# Patient Record
Sex: FEMALE | Race: BLACK OR AFRICAN AMERICAN | Employment: FULL TIME | URBAN - METROPOLITAN AREA
[De-identification: names, ages, dates, MRNs, and addresses within clinical notes are randomized per-mention and may not be internally consistent; named-entity substitution may affect disease eponyms.]

---

## 2017-01-25 ENCOUNTER — OFFICE VISIT (OUTPATIENT)
Dept: INTERNAL MEDICINE CLINIC | Age: 62
End: 2017-01-25

## 2017-01-25 VITALS
SYSTOLIC BLOOD PRESSURE: 143 MMHG | HEART RATE: 98 BPM | WEIGHT: 207.6 LBS | BODY MASS INDEX: 33.37 KG/M2 | OXYGEN SATURATION: 99 % | RESPIRATION RATE: 20 BRPM | DIASTOLIC BLOOD PRESSURE: 80 MMHG | HEIGHT: 66 IN | TEMPERATURE: 98.1 F

## 2017-01-25 DIAGNOSIS — I10 ESSENTIAL HYPERTENSION: ICD-10-CM

## 2017-01-25 DIAGNOSIS — E66.9 OBESITY (BMI 30.0-34.9): ICD-10-CM

## 2017-01-25 DIAGNOSIS — E78.2 MIXED HYPERLIPIDEMIA: ICD-10-CM

## 2017-01-25 DIAGNOSIS — M54.50 ACUTE MIDLINE LOW BACK PAIN WITHOUT SCIATICA: Primary | ICD-10-CM

## 2017-01-25 DIAGNOSIS — F17.200 SMOKING: ICD-10-CM

## 2017-01-25 DIAGNOSIS — R73.9 ELEVATED BLOOD SUGAR: ICD-10-CM

## 2017-01-25 DIAGNOSIS — E55.9 VITAMIN D DEFICIENCY: ICD-10-CM

## 2017-01-25 RX ORDER — DICLOFENAC SODIUM 75 MG/1
75 TABLET, DELAYED RELEASE ORAL
Qty: 30 TAB | Refills: 0 | Status: SHIPPED | OUTPATIENT
Start: 2017-01-25 | End: 2018-04-17

## 2017-01-25 RX ORDER — BISOPROLOL FUMARATE AND HYDROCHLOROTHIAZIDE 2.5; 6.25 MG/1; MG/1
1 TABLET ORAL DAILY
Qty: 90 TAB | Refills: 1 | Status: SHIPPED | OUTPATIENT
Start: 2017-01-25 | End: 2017-07-25 | Stop reason: SDUPTHER

## 2017-01-25 RX ORDER — SIMVASTATIN 20 MG/1
20 TABLET, FILM COATED ORAL
Qty: 90 TAB | Refills: 1 | Status: SHIPPED | OUTPATIENT
Start: 2017-01-25 | End: 2017-07-25 | Stop reason: SDUPTHER

## 2017-01-25 NOTE — PATIENT INSTRUCTIONS

## 2017-01-25 NOTE — PROGRESS NOTES
Health Maintenance Due   Topic Date Due    Hepatitis C Screening  1955    Pneumococcal 19-64 Medium Risk (1 of 1 - PPSV23) 11/18/1974    FOBT Q 1 YEAR AGE 50-75  11/18/2005    ZOSTER VACCINE AGE 60>  11/18/2015    INFLUENZA AGE 9 TO ADULT  08/01/2016       Chief Complaint   Patient presents with    Follow-up    Hypertension    Cholesterol Problem    Obesity    Hip Pain     left hip pain radiating down left leg       1. Have you been to the ER, urgent care clinic since your last visit? Hospitalized since your last visit? No    2. Have you seen or consulted any other health care providers outside of the [address] [place] [address] since your last visit? Include any pap smears or colon screening. No    3) Do you have an Advance Directive on file? no    4) Are you interested in receiving information on Advance Directives? NO      Patient is accompanied by self I have received verbal consent from Rudy Bolivar to discuss any/all medical information while they are present in the room.

## 2017-01-25 NOTE — PROGRESS NOTES
HISTORY OF PRESENT ILLNESS  Jessica Mejia is a 64 y.o. female here for follow up. reports lower back pain radiates to left hip and leg sometimes. no weakness in legs. no bowel bladder problem. Gained 9 pound weight. not watching diet. Has HTN,on med. need refill. Has elevated lipid. on statin. no myalgia. mammogram was abnormal.need F/U.  colonoscopy was normal and up to date. HPI    Review of Systems   Constitutional: Negative. HENT: Negative. Eyes: Negative. Respiratory: Negative. Cardiovascular: Negative. Gastrointestinal: Negative. Genitourinary: Negative. Musculoskeletal: Positive for back pain. Negative for falls. Skin: Negative. Neurological: Negative. Psychiatric/Behavioral: Negative. Negative for depression and suicidal ideas. Physical Exam   Constitutional: She appears well-developed and well-nourished. No distress. Neck: Normal range of motion. Neck supple. No JVD present. No thyromegaly present. Cardiovascular: Normal rate, regular rhythm, normal heart sounds and intact distal pulses. Pulmonary/Chest: Effort normal and breath sounds normal. No respiratory distress. She has no wheezes. She has no rales. Abdominal: Soft. Bowel sounds are normal. She exhibits no distension. There is no tenderness. Musculoskeletal: She exhibits tenderness. Lower back:spine tender. paravertebral facet tenderness. ORm restricted. Straight leg rising test negative. ASSESSMENT and PLAN  Silva Moreno was seen today for follow-up, hypertension, cholesterol problem, obesity and hip pain. Diagnoses and all orders for this visit:    Acute midline low back pain without sciatica    Rest and heating pad. Will give,  -     CBC WITH AUTOMATED DIFF  -     XR SPINE LUMB MIN 4 V; Future if not better. -     diclofenac EC (VOLTAREN) 75 mg EC tablet; Take 1 Tab by mouth two (2) times daily as needed. Essential hypertension    Stable.   -     METABOLIC PANEL, COMPREHENSIVE  -     CBC WITH AUTOMATED DIFF  -     bisoprolol-hydroCHLOROthiazide (ZIAC) 2.5-6.25 mg per tablet; Take 1 Tab by mouth daily. Mixed hyperlipidemia    Lipid is stable. will do,  -     METABOLIC PANEL, COMPREHENSIVE  -     simvastatin (ZOCOR) 20 mg tablet; Take 1 Tab by mouth nightly. Obesity (BMI 30.0-34. 9)  Gained 9 pound weight. Addressed weight, diet and exercise with patient. Decrease carbohydrates (white foods, sweet foods, sweet drinks and alcohol), increase green leafy vegetables and protein (lean meats and beans) with each meal. Avoid fried foods. Eat 3-5 small meals daily. Do not skip meals. Increase water intake. Increase physical activity to 30 minutes daily for health benefit or 60 minutes daily to prevent weight regain, as tolerated. Get 7-8 hours uninterrupted sleep nightly. Elevated blood sugar  -     HEMOGLOBIN A1C WITH EAG    Vitamin D deficiency  -     VITAMIN D, 25 HYDROXY    Smoking    Smokes 2/3 ciggrates a day. Discussed expected course/resolution/complications of diagnosis in detail with patient. Medication risks/benefits/costs/interactions/alternatives discussed with patient. Pt was given an after visit summary which includes diagnoses, current medications & vitals. Pt expressed understanding with the diagnosis and plan.

## 2017-01-25 NOTE — MR AVS SNAPSHOT
Visit Information Date & Time Provider Department Dept. Phone Encounter #  
 1/25/2017  1:45 PM Muriel Ellis, [facility] [id number] Upcoming Health Maintenance Date Due Hepatitis C Screening 1955 Pneumococcal 19-64 Medium Risk (1 of 1 - PPSV23) 11/18/1974 FOBT Q 1 YEAR AGE 50-75 11/18/2005 ZOSTER VACCINE AGE 60> 11/18/2015 INFLUENZA AGE 9 TO ADULT 8/1/2016 PAP AKA CERVICAL CYTOLOGY 7/2/2017 BREAST CANCER SCRN MAMMOGRAM 11/16/2018 DTaP/Tdap/Td series (2 - Td) 8/10/2026 Allergies as of 1/25/2017  Review Complete On: 1/25/2017 By: Muriel Ellis MD  
 No Known Allergies Current Immunizations  Reviewed on 8/10/2016 Name Date Tdap 8/10/2016 Not reviewed this visit You Were Diagnosed With   
  
 Codes Comments Acute midline low back pain without sciatica    -  Primary ICD-10-CM: M54.5 ICD-9-CM: 724.2 Essential hypertension     ICD-10-CM: I10 
ICD-9-CM: 401.9 Mixed hyperlipidemia     ICD-10-CM: E78.2 ICD-9-CM: 272.2 Obesity (BMI 30.0-34.9)     ICD-10-CM: U59.7 ICD-9-CM: 278.00 Elevated blood sugar     ICD-10-CM: R73.9 ICD-9-CM: 790.29 Vitamin D deficiency     ICD-10-CM: E55.9 ICD-9-CM: 268.9 Smoking     ICD-10-CM: F17.200 ICD-9-CM: 305.1 Vitals BP Pulse Temp Resp Height(growth percentile) Weight(growth percentile) 143/80 (BP 1 Location: Left arm, BP Patient Position: Sitting) 98 98.1 °F (36.7 °C) (Oral) 20 5' 6\" (1.676 m) 207 lb 9.6 oz (94.2 kg) SpO2 BMI OB Status Smoking Status 99% 33.51 kg/m2 Postmenopausal Current Every Day Smoker Vitals History BMI and BSA Data Body Mass Index Body Surface Area  
 33.51 kg/m 2 2.09 m 2 Preferred Pharmacy Pharmacy Name Phone [facility] [address] [place] [address] [phone] Your Updated Medication List  
  
   
 This list is accurate as of: 1/25/17  2:17 PM.  Always use your most recent med list.  
  
  
  
  
 bisoprolol-hydroCHLOROthiazide 2.5-6.25 mg per tablet Commonly known as:  [facility] TAKE ONE TABLET BY MOUTH ONCE DAILY  
  
 calcium carbonate 200 mg calcium (500 mg) Chew Commonly known as:  TUMS Take 1 Tab by mouth daily. diclofenac EC 75 mg EC tablet Commonly known as:  VOLTAREN Take 1 Tab by mouth two (2) times daily as needed. ergocalciferol 50,000 unit capsule Commonly known as:  ERGOCALCIFEROL Take 1 Cap by mouth every seven (7) days. simvastatin 20 mg tablet Commonly known as:  ZOCOR  
TAKE ONE TABLET BY MOUTH NIGHTLY  
  
 VALTREX 500 mg tablet Generic drug:  valACYclovir Take  by mouth two (2) times a day. varenicline 0.5 mg (11)- 1 mg (42) Dspk Commonly known as:  CHANTIX STARTER INÉS As directed Prescriptions Sent to Pharmacy Refills  
 diclofenac EC (VOLTAREN) 75 mg EC tablet 0 Sig: Take 1 Tab by mouth two (2) times daily as needed. Class: Normal  
 Pharmacy: [address] #: [phone] Route: Oral  
  
We Performed the Following CBC WITH AUTOMATED DIFF [33723 CPT(R)] HEMOGLOBIN A1C WITH EAG [16783 CPT(R)] METABOLIC PANEL, COMPREHENSIVE [67341 CPT(R)] VITAMIN D, 25 HYDROXY [id number] CPT(R)] To-Do List   
 02/25/2017 Imaging:  XR SPINE LUMB MIN 4 V Patient Instructions Back Stretches: Exercises Your Care Instructions Here are some examples of exercises for stretching your back. Start each exercise slowly. Ease off the exercise if you start to have pain. Your doctor or physical therapist will tell you when you can start these exercises and which ones will work best for you. How to do the exercises Overhead stretch 1. Stand comfortably with your feet shoulder-width apart. 2. Looking straight ahead, raise both arms over your head and reach toward the ceiling. Do not allow your head to tilt back. 3. Hold for 15 to 30 seconds, then lower your arms to your sides. 4. Repeat 2 to 4 times. Side stretch 1. Stand comfortably with your feet shoulder-width apart. 2. Raise one arm over your head, and then lean to the other side. 3. Slide your hand down your leg as you let the weight of your arm gently stretch your side muscles. Hold for 15 to 30 seconds. 4. Repeat 2 to 4 times on each side. Press-up 1. Lie on your stomach, supporting your body with your forearms. 2. Press your elbows down into the floor to raise your upper back. As you do this, relax your stomach muscles and allow your back to arch without using your back muscles. As your press up, do not let your hips or pelvis come off the floor. 3. Hold for 15 to 30 seconds, then relax. 4. Repeat 2 to 4 times. Relax and rest 
 
1. Lie on your back with a rolled towel under your neck and a pillow under your knees. Extend your arms comfortably to your sides. 2. Relax and breathe normally. 3. Remain in this position for about 10 minutes. 4. If you can, do this 2 or 3 times each day. Follow-up care is a key part of your treatment and safety. Be sure to make and go to all appointments, and call your doctor if you are having problems. It's also a good idea to know your test results and keep a list of the medicines you take. Where can you learn more? Go to http://chip-chetan.info/. Enter T088 in the search box to learn more about \"Back Stretches: Exercises. \" Current as of: May 23, 2016 Content Version: 11.1 © 3453-5289 [employer]. Care instructions adapted under license by [employer] (which disclaims liability or warranty for this information).  If you have questions about a medical condition or this instruction, always ask your healthcare professional. Cinthia Hansen[employer] disclaims any warranty or liability for your use of this information. Introducing [facility]! Romayne Duster introduces [employer]ix patient portal. Now you can access parts of your medical record, email your doctor's office, and request medication refills online. 1. In your internet browser, go to [email or web address] 2. Click on the First Time User? Click Here link in the Sign In box. You will see the New Member Sign Up page. 3. Enter your Quartix Access Code exactly as it appears below. You will not need to use this code after youve completed the sign-up process. If you do not sign up before the expiration date, you must request a new code. · Quartix Access Code: [email or web address] Expires: 4/25/2017  2:17 PM 
 
4. Enter the last four digits of your Social Security Number (xxxx) and Date of Birth (mm/dd/yyyy) as indicated and click Submit. You will be taken to the next sign-up page. 5. Create a Quartix ID. This will be your Quartix login ID and cannot be changed, so think of one that is secure and easy to remember. 6. Create a Quartix password. You can change your password at any time. 7. Enter your Password Reset Question and Answer. This can be used at a later time if you forget your password. 8. Enter your e-mail address. You will receive e-mail notification when new information is available in [address] 9. Click Sign Up. You can now view and download portions of your medical record. 10. Click the Download Summary menu link to download a portable copy of your medical information. If you have questions, please visit the Frequently Asked Questions section of the [employer] website. Remember, [employer]ix is NOT to be used for urgent needs. For medical emergencies, dial 911. Now available from your iPhone and Android! Please provide this summary of care documentation to your next provider. Your primary care clinician is listed as Ji Patricio. If you have any questions after today's visit, please call [phone].

## 2017-01-25 NOTE — LETTER
2/3/2017 11:30 AM 
 
Ms. Bhavya Alvarado [address] [employer] [id number] Dear Bhavya Alvarado: 
 
Please find your most recent results below. Resulted Orders METABOLIC PANEL, COMPREHENSIVE Result Value Ref Range Glucose 95 65 - 99 mg/dL BUN 15 8 - 27 mg/dL Creatinine 0.81 0.57 - 1.00 mg/dL GFR est non-AA 79 >59 mL/min/1.73 GFR est AA 91 >59 mL/min/1.73  
 BUN/Creatinine ratio 19 11 - 26 Sodium 144 134 - 144 mmol/L Potassium 4.5 3.5 - 5.2 mmol/L Chloride 102 96 - 106 mmol/L  
 CO2 26 18 - 29 mmol/L Calcium 9.6 8.7 - 10.3 mg/dL Protein, total 6.9 6.0 - 8.5 g/dL Albumin 4.2 3.6 - 4.8 g/dL GLOBULIN, TOTAL 2.7 1.5 - 4.5 g/dL A-G Ratio 1.6 1.1 - 2.5 Bilirubin, total 0.2 0.0 - 1.2 mg/dL Alk. phosphatase 98 39 - 117 IU/L  
 AST (SGOT) 19 0 - 40 IU/L  
 ALT (SGPT) 16 0 - 32 IU/L Narrative Performed at:  [address]  [id number] : Thao Romo MD, Phone:  [phone] CBC WITH AUTOMATED DIFF Result Value Ref Range WBC 4.1 3.4 - 10.8 x10E3/uL  
 RBC 3.76 (L) 3.77 - 5.28 x10E6/uL HGB 11.7 11.1 - 15.9 g/dL HCT 35.8 34.0 - 46.6 % MCV 95 79 - 97 fL  
 MCH 31.1 26.6 - 33.0 pg  
 MCHC 32.7 31.5 - 35.7 g/dL  
 RDW 13.1 12.3 - 15.4 % PLATELET 402 045 - 607 x10E3/uL NEUTROPHILS 50 % Lymphocytes 38 % MONOCYTES 7 % EOSINOPHILS 4 % BASOPHILS 1 %  
 ABS. NEUTROPHILS 2.1 1.4 - 7.0 x10E3/uL Abs Lymphocytes 1.6 0.7 - 3.1 x10E3/uL  
 ABS. MONOCYTES 0.3 0.1 - 0.9 x10E3/uL  
 ABS. EOSINOPHILS 0.2 0.0 - 0.4 x10E3/uL  
 ABS. BASOPHILS 0.0 0.0 - 0.2 x10E3/uL IMMATURE GRANULOCYTES 0 %  
 ABS. IMM. GRANS. 0.0 0.0 - 0.1 x10E3/uL Narrative Performed at:  [address]  [id number] : Thao Romo MD, Phone:  [phone] HEMOGLOBIN A1C WITH EAG Result Value Ref Range Hemoglobin A1c 5.9 (H) 4.8 - 5.6 % Comment: Pre-diabetes: 5.7 - 6.4 Diabetes: >6.4 Glycemic control for adults with diabetes: <7.0 Estimated average glucose 123 mg/dL Narrative Performed at:  [address]  [id number] : Darletta Dancer MD, Phone:  [phone] VITAMIN D, 25 HYDROXY Result Value Ref Range VITAMIN D, 25-HYDROXY 18.3 (L) 30.0 - 100.0 ng/mL Comment:  
   Vitamin D deficiency has been defined by the [address] [place] [address] practice guideline as a 
level of serum 25-OH vitamin D less than 20 ng/mL (1,2). The Endocrine Society went on to further define vitamin D 
insufficiency as a level between 21 and 29 ng/mL (2). 1. IOM ([place] of Medicine). 2010. Dietary reference 
   intakes for calcium and D. [address] [place] [address]: The 
   [place]. 2. Katie MF, Araceli NC, Kerline ARNOLD, et al. 
   Evaluation, treatment, and prevention of vitamin D 
   deficiency: an Endocrine Society clinical practice 
   guideline. JCEM. 2011 Jul; 96(7):1911-30. Narrative Performed at:  [address]  [id number] : Darletta Dancer MD, Phone:  [phone] RECOMMENDATIONS: 
Quin Nap your labs indicate that  your vitamin  D level very low, will start on vit D 50,000 unit 1 cap weekly for 4 months. will repeat level in 4 months and increase your consumption of  milk product and expose to sun for 20 min a day. All other labs are stable Please call me if you have any questions: [phone] Sincerely, Saumya Shane MD

## 2017-01-26 LAB
25(OH)D3+25(OH)D2 SERPL-MCNC: 18.3 NG/ML (ref 30–100)
ALBUMIN SERPL-MCNC: 4.2 G/DL (ref 3.6–4.8)
ALBUMIN/GLOB SERPL: 1.6 {RATIO} (ref 1.1–2.5)
ALP SERPL-CCNC: 98 IU/L (ref 39–117)
ALT SERPL-CCNC: 16 IU/L (ref 0–32)
AST SERPL-CCNC: 19 IU/L (ref 0–40)
BASOPHILS # BLD AUTO: 0 X10E3/UL (ref 0–0.2)
BASOPHILS NFR BLD AUTO: 1 %
BILIRUB SERPL-MCNC: 0.2 MG/DL (ref 0–1.2)
BUN SERPL-MCNC: 15 MG/DL (ref 8–27)
BUN/CREAT SERPL: 19 (ref 11–26)
CALCIUM SERPL-MCNC: 9.6 MG/DL (ref 8.7–10.3)
CHLORIDE SERPL-SCNC: 102 MMOL/L (ref 96–106)
CO2 SERPL-SCNC: 26 MMOL/L (ref 18–29)
CREAT SERPL-MCNC: 0.81 MG/DL (ref 0.57–1)
EOSINOPHIL # BLD AUTO: 0.2 X10E3/UL (ref 0–0.4)
EOSINOPHIL NFR BLD AUTO: 4 %
ERYTHROCYTE [DISTWIDTH] IN BLOOD BY AUTOMATED COUNT: 13.1 % (ref 12.3–15.4)
EST. AVERAGE GLUCOSE BLD GHB EST-MCNC: 123 MG/DL
GLOBULIN SER CALC-MCNC: 2.7 G/DL (ref 1.5–4.5)
GLUCOSE SERPL-MCNC: 95 MG/DL (ref 65–99)
HBA1C MFR BLD: 5.9 % (ref 4.8–5.6)
HCT VFR BLD AUTO: 35.8 % (ref 34–46.6)
HGB BLD-MCNC: 11.7 G/DL (ref 11.1–15.9)
IMM GRANULOCYTES # BLD: 0 X10E3/UL (ref 0–0.1)
IMM GRANULOCYTES NFR BLD: 0 %
LYMPHOCYTES # BLD AUTO: 1.6 X10E3/UL (ref 0.7–3.1)
LYMPHOCYTES NFR BLD AUTO: 38 %
MCH RBC QN AUTO: 31.1 PG (ref 26.6–33)
MCHC RBC AUTO-ENTMCNC: 32.7 G/DL (ref 31.5–35.7)
MCV RBC AUTO: 95 FL (ref 79–97)
MONOCYTES # BLD AUTO: 0.3 X10E3/UL (ref 0.1–0.9)
MONOCYTES NFR BLD AUTO: 7 %
NEUTROPHILS # BLD AUTO: 2.1 X10E3/UL (ref 1.4–7)
NEUTROPHILS NFR BLD AUTO: 50 %
PLATELET # BLD AUTO: 293 X10E3/UL (ref 150–379)
POTASSIUM SERPL-SCNC: 4.5 MMOL/L (ref 3.5–5.2)
PROT SERPL-MCNC: 6.9 G/DL (ref 6–8.5)
RBC # BLD AUTO: 3.76 X10E6/UL (ref 3.77–5.28)
SODIUM SERPL-SCNC: 144 MMOL/L (ref 134–144)
WBC # BLD AUTO: 4.1 X10E3/UL (ref 3.4–10.8)

## 2017-02-01 DIAGNOSIS — E55.9 VITAMIN D DEFICIENCY: ICD-10-CM

## 2017-02-01 RX ORDER — ERGOCALCIFEROL 1.25 MG/1
50000 CAPSULE ORAL
Qty: 4 CAP | Refills: 2 | Status: SHIPPED | OUTPATIENT
Start: 2017-02-01 | End: 2017-10-26 | Stop reason: ALTCHOICE

## 2017-02-01 NOTE — PROGRESS NOTES
vit D level very low.will start on vit D 50,000 unit 1 cap weekly for 4 months. will repeat level in 4 months. adv to be on milk product and expose to sun for 20 min a day. all other labs are stable.

## 2017-02-03 NOTE — PROGRESS NOTES
Spoke with patient after verifying name and . Notified patient of lab results and recommendation from provider. Patient verbalized understanding and given a chance to ask questions.   Letter mailed to patient with results and recommendations from provider

## 2017-06-23 ENCOUNTER — HOSPITAL ENCOUNTER (OUTPATIENT)
Dept: MAMMOGRAPHY | Age: 62
Discharge: HOME | End: 2017-06-23
Attending: INTERNAL MEDICINE
Payer: SELF-PAY

## 2017-06-23 DIAGNOSIS — R92.8 FOLLOW-UP EXAMINATION OF ABNORMAL MAMMOGRAM: ICD-10-CM

## 2017-06-23 PROCEDURE — 77066 DX MAMMO INCL CAD BI: CPT

## 2017-06-23 NOTE — LETTER
6/28/2017 9:41 AM 
 
Ms. Yeison Arnold [address] [place] [id number] Dear Yeison Arnold: 
 
Please find your most recent results below. Resulted Orders [facility] MAMMO BI DX INCL CAD Narrative EXAM:  [facility] MAMMO BI DX INCL CAD INDICATION: The patient presents for six-month follow-up of bilateral breast 
densities. No current breast complaint. No family history. COMPARISON: 11/2016, 8/2015, and dating back to 3/2003 TECHNIQUE: Diagnostic bilateral digital MLO, CC, and spot compression views. Computer aided detection (CAD) was utilized. BREAST COMPOSITION: There are scattered fibroglandular densities (approximately 25-50% glandular). FINDINGS:  
There is a stable bilateral breast parenchymal pattern. No suspicious mass, 
cluster of pleomorphic calcifications, or architectural distortion to suggest 
malignancy. No skin thickening or nipple retraction. Impression IMPRESSION: No evidence for malignancy in the right or left breast. 
 
Recommendation: 
Bilateral screening mammography in one year. BI-RADS Assessment Category 2: Benign finding. The findings and recommendations were discussed with the patient at the time of 
the exam. 
  
 
 
RECOMMENDATIONS: 
None. Keep up the good work! Please call me if you have any questions: [phone] Sincerely, [facility] 6

## 2017-07-25 DIAGNOSIS — E78.2 MIXED HYPERLIPIDEMIA: ICD-10-CM

## 2017-07-25 DIAGNOSIS — I10 ESSENTIAL HYPERTENSION: ICD-10-CM

## 2017-07-25 RX ORDER — SIMVASTATIN 20 MG/1
TABLET, FILM COATED ORAL
Qty: 90 TAB | Refills: 1 | Status: SHIPPED | OUTPATIENT
Start: 2017-07-25 | End: 2018-01-22 | Stop reason: SDUPTHER

## 2017-07-25 RX ORDER — BISOPROLOL FUMARATE AND HYDROCHLOROTHIAZIDE 2.5; 6.25 MG/1; MG/1
TABLET ORAL
Qty: 90 TAB | Refills: 1 | Status: SHIPPED | OUTPATIENT
Start: 2017-07-25 | End: 2018-01-22 | Stop reason: SDUPTHER

## 2017-10-26 ENCOUNTER — OFFICE VISIT (OUTPATIENT)
Dept: INTERNAL MEDICINE CLINIC | Age: 62
End: 2017-10-26

## 2017-10-26 VITALS
HEIGHT: 66 IN | OXYGEN SATURATION: 97 % | RESPIRATION RATE: 20 BRPM | WEIGHT: 197 LBS | TEMPERATURE: 98.5 F | SYSTOLIC BLOOD PRESSURE: 146 MMHG | BODY MASS INDEX: 31.66 KG/M2 | HEART RATE: 62 BPM | DIASTOLIC BLOOD PRESSURE: 87 MMHG

## 2017-10-26 DIAGNOSIS — E66.9 OBESITY (BMI 30.0-34.9): ICD-10-CM

## 2017-10-26 DIAGNOSIS — R73.03 PRE-DIABETES: ICD-10-CM

## 2017-10-26 DIAGNOSIS — K59.00 CONSTIPATION, UNSPECIFIED CONSTIPATION TYPE: ICD-10-CM

## 2017-10-26 DIAGNOSIS — F17.200 SMOKING: ICD-10-CM

## 2017-10-26 DIAGNOSIS — I10 ESSENTIAL HYPERTENSION: Primary | ICD-10-CM

## 2017-10-26 DIAGNOSIS — E55.9 VITAMIN D DEFICIENCY: ICD-10-CM

## 2017-10-26 DIAGNOSIS — E78.2 MIXED HYPERLIPIDEMIA: ICD-10-CM

## 2017-10-26 RX ORDER — ERGOCALCIFEROL 1.25 MG/1
50000 CAPSULE ORAL
Qty: 4 CAP | Refills: 2 | Status: SHIPPED | OUTPATIENT
Start: 2017-10-26 | End: 2019-05-29 | Stop reason: ALTCHOICE

## 2017-10-26 NOTE — LETTER
11/7/2017 8:07 AM 
 
Ms. Chente Ely [address] [place] [id number] Dear Chente Ely: 
 
Please find your most recent results below. Resulted Orders METABOLIC PANEL, COMPREHENSIVE Result Value Ref Range Glucose 115 (H) 65 - 99 mg/dL BUN 12 8 - 27 mg/dL Creatinine 0.71 0.57 - 1.00 mg/dL GFR est non-AA 92 >59 mL/min/1.73 GFR est  >59 mL/min/1.73  
 BUN/Creatinine ratio 17 12 - 28 Sodium 143 134 - 144 mmol/L Potassium 4.5 3.5 - 5.2 mmol/L Chloride 103 96 - 106 mmol/L  
 CO2 27 18 - 29 mmol/L Calcium 9.6 8.7 - 10.3 mg/dL Protein, total 7.2 6.0 - 8.5 g/dL Albumin 4.4 3.6 - 4.8 g/dL GLOBULIN, TOTAL 2.8 1.5 - 4.5 g/dL A-G Ratio 1.6 1.2 - 2.2 Bilirubin, total 0.4 0.0 - 1.2 mg/dL Alk. phosphatase 113 39 - 117 IU/L  
 AST (SGOT) 17 0 - 40 IU/L  
 ALT (SGPT) 14 0 - 32 IU/L Narrative Performed at:  [address]  [id number] : Marla Leone MD, Phone:  [phone] HEMOGLOBIN A1C WITH EAG Result Value Ref Range Hemoglobin A1c 5.5 4.8 - 5.6 % Comment:  
            Pre-diabetes: 5.7 - 6.4 Diabetes: >6.4 Glycemic control for adults with diabetes: <7.0 Estimated average glucose 111 mg/dL Narrative Performed at:  [address]  [id number] : Marla Leone MD, Phone:  [phone] LIPID PANEL Result Value Ref Range Cholesterol, total 192 100 - 199 mg/dL Triglyceride 69 0 - 149 mg/dL HDL Cholesterol 60 >39 mg/dL VLDL, calculated 14 5 - 40 mg/dL LDL, calculated 118 (H) 0 - 99 mg/dL Narrative Performed at:  [address]  [id number] : Marla Leone MD, Phone:  [phone] VITAMIN D, 25 HYDROXY Result Value Ref Range VITAMIN D, 25-HYDROXY CANCELED ng/mL    Comment:  
   Test cancelled at client's request. 
 Vitamin D deficiency has been defined by the [facility] practice guideline as a 
level of serum 25-OH vitamin D less than 20 ng/mL (1,2). The Endocrine Society went on to further define vitamin D 
insufficiency as a level between 21 and 29 ng/mL (2). 1. IOM ([place] of Medicine). 2010. Dietary reference 
   intakes for calcium and D. [facility]: The 
   [place]. 2. Katie MF, Araceli NC, Kerline ARNOLD, et al. 
   Evaluation, treatment, and prevention of vitamin D 
   deficiency: an Endocrine Society clinical practice 
   guideline. JCEM. 2011 Jul; 96(7):1911-30. Result canceled by the ancillary Narrative Performed at:  [address]  [id number] : Isiah Sands MD, Phone:  [phone] CVD REPORT Result Value Ref Range INTERPRETATION Note Comment:  
   Supplemental report is available. Narrative Performed at:  [address] [place] [address]  [id number] : Vicky Gillespie PhD, Phone:  [phone] RECOMMENDATIONS: 
Karmen Garsia your labs indicate that Your Vitamin D level is low, start taking OTC Vitamin D 1000 units 1 x a day for 4 months. Also increase your consumption of milk and exposure to the sun for 20 minutes a day. We will recheck your Vitamin D level in 4 months Also,  your total cholesterol and LDL are elevated, please be on a low fat/cholesterol diet and exercise as tolerated. All other labs are stable Please call me if you have any questions: [phone] Sincerely, Lboo Yan MD

## 2017-10-26 NOTE — PROGRESS NOTES
Health Maintenance Due   Topic Date Due    Hepatitis C Screening  1955    Pneumococcal 19-64 Medium Risk (1 of 1 - PPSV23) 11/18/1974    FOBT Q 1 YEAR AGE 50-75  11/18/2005    ZOSTER VACCINE AGE 60>  09/18/2015    PAP AKA CERVICAL CYTOLOGY  07/02/2017    INFLUENZA AGE 9 TO ADULT  08/01/2017       Chief Complaint   Patient presents with    Hypertension    Cholesterol Problem    Obesity       1. Have you been to the ER, urgent care clinic since your last visit? Hospitalized since your last visit? No    2. Have you seen or consulted any other health care providers outside of the [address] [place] [address] since your last visit? Include any pap smears or colon screening. No    3) Do you have an Advance Directive on file? no    4) Are you interested in receiving information on Advance Directives? NO      Patient is accompanied by self I have received verbal consent from Rudy Bolivar to discuss any/all medical information while they are present in the room.

## 2017-10-26 NOTE — MR AVS SNAPSHOT
Visit Information Date & Time Provider Department Dept. Phone Encounter #  
 10/26/2017  9:30 AM Saumya Shane MD [facility] Internal Medicine [phone] [id number] Upcoming Health Maintenance Date Due Hepatitis C Screening 1955 Pneumococcal 19-64 Medium Risk (1 of 1 - PPSV23) 11/18/1974 FOBT Q 1 YEAR AGE 50-75 11/18/2005 ZOSTER VACCINE AGE 60> 9/18/2015 PAP AKA CERVICAL CYTOLOGY 7/2/2017 BREAST CANCER SCRN MAMMOGRAM 6/23/2019 DTaP/Tdap/Td series (2 - Td) 8/10/2026 Allergies as of 10/26/2017  Review Complete On: 10/26/2017 By: Saumya Shane MD  
 No Known Allergies Current Immunizations  Reviewed on 10/26/2017 Name Date Tdap 8/10/2016 Reviewed by Saumya Shane MD on 10/26/2017 at 10:01 AM  
You Were Diagnosed With   
  
 Codes Comments Essential hypertension    -  Primary ICD-10-CM: I10 
ICD-9-CM: 401.9 Mixed hyperlipidemia     ICD-10-CM: E78.2 ICD-9-CM: 272.2 Obesity (BMI 30.0-34.9)     ICD-10-CM: M11.9 ICD-9-CM: 278.00 Pre-diabetes     ICD-10-CM: R73.03 
ICD-9-CM: 790.29 Smoking     ICD-10-CM: F17.200 ICD-9-CM: 305.1 Vitamin D deficiency     ICD-10-CM: E55.9 ICD-9-CM: 268.9 Constipation, unspecified constipation type     ICD-10-CM: K59.00 ICD-9-CM: 564.00 Vitals BP Pulse Temp Resp Height(growth percentile) Weight(growth percentile) 146/87 (BP 1 Location: Left arm, BP Patient Position: Sitting) 62 98.5 °F (36.9 °C) (Oral) 20 5' 6\" (1.676 m) 197 lb (89.4 kg) SpO2 BMI OB Status Smoking Status 97% 31.8 kg/m2 Postmenopausal Current Every Day Smoker Vitals History BMI and BSA Data Body Mass Index Body Surface Area  
 31.8 kg/m 2 2.04 m 2 Preferred Pharmacy Pharmacy Name Phone [facility] [address] [place] [address] [phone] Your Updated Medication List  
  
   
This list is accurate as of: 10/26/17 10:01 AM.  Always use your most recent med list.  
  
  
  
  
 bisoprolol-hydroCHLOROthiazide 2.5-6.25 mg per tablet Commonly known as:  Formerly [facility] TAKE ONE TABLET BY MOUTH ONCE DAILY  
  
 calcium carbonate 200 mg calcium (500 mg) Chew Commonly known as:  TUMS Take 1 Tab by mouth daily. diclofenac EC 75 mg EC tablet Commonly known as:  VOLTAREN Take 1 Tab by mouth two (2) times daily as needed. simvastatin 20 mg tablet Commonly known as:  ZOCOR  
TAKE ONE TABLET BY MOUTH NIGHTLY  
  
 VALTREX 500 mg tablet Generic drug:  valACYclovir Take  by mouth two (2) times a day. varenicline 0.5 mg (11)- 1 mg (42) Dspk Commonly known as:  CHANTIX STARTER INÉS As directed We Performed the Following HEMOGLOBIN A1C WITH EAG [95855 CPT(R)] LIPID PANEL [40033 CPT(R)] METABOLIC PANEL, COMPREHENSIVE [50255 CPT(R)] VITAMIN D, 25 HYDROXY [id number] CPT(R)] Patient Instructions High-Fiber Diet: Care Instructions Your Care Instructions A high-fiber diet may help you relieve constipation and feel less bloated. Your doctor and dietitian will help you make a high-fiber eating plan based on your personal needs. The plan will include the things you like to eat. It will also make sure that you get 30 grams of fiber a day. Before you make changes to the way you eat, be sure to talk with your doctor or dietitian. Follow-up care is a key part of your treatment and safety. Be sure to make and go to all appointments, and call your doctor if you are having problems. It's also a good idea to know your test results and keep a list of the medicines you take. How can you care for yourself at home? · You can increase how much fiber you get if you eat more of certain foods. These foods include: ¨ Whole-grain breads and cereals. ¨ Fruits, such as pears, apples, and peaches. Eat the skins, peels, and seeds, if you can. ¨ Vegetables, such as broccoli, cabbage, spinach, carrots, asparagus, and squash. ¨ Starchy vegetables. These include potatoes with skins, kidney beans, and lima beans. · Take a fiber supplement every day if your doctor recommends it. Examples are Benefiber, Citrucel, FiberCon, and Metamucil. Ask your doctor how much to take. · Drink plenty of fluids, enough so that your urine is light yellow or clear like water. If you have kidney, heart, or liver disease and have to limit fluids, talk with your doctor before you increase the amount of fluids you drink. · Get some exercise every day. Exercise helps stool move through the colon. It also helps prevent constipation. · Keep a food diary. Try to notice and write down what foods cause gas, pain, or other symptoms. Then you can avoid these foods. Where can you learn more? Go to http://chip-chetan.info/. Enter G383 in the search box to learn more about \"High-Fiber Diet: Care Instructions. \" Current as of: May 12, 2017 Content Version: 11.4 © 9113-1057 [employer]. Care instructions adapted under license by [employer] (which disclaims liability or warranty for this information). If you have questions about a medical condition or this instruction, always ask your healthcare professional. [place] [address] any warranty or liability for your use of this information. Introducing \[facility]\"" & [facility] SERVICES! Tomás Cha introduces [employer] patient portal. Now you can access parts of your medical record, email your doctor's office, and request medication refills online. 1. In your internet browser, go to [email or web address] 2. Click on the First Time User? Click Here link in the Sign In box. You will see the New Member Sign Up page. 3. Enter your [employer] Access Code exactly as it appears below. You will not need to use this code after youve completed the sign-up process. If you do not sign up before the expiration date, you must request a new code. · [employer] Access Code: [email or web address] Expires: 1/13/2018  2:09 PM 
 
4. Enter the last four digits of your Social Security Number (xxxx) and Date of Birth (mm/dd/yyyy) as indicated and click Submit. You will be taken to the next sign-up page. 5. Create a [employer] ID. This will be your [employer] login ID and cannot be changed, so think of one that is secure and easy to remember. 6. Create a [employer] password. You can change your password at any time. 7. Enter your Password Reset Question and Answer. This can be used at a later time if you forget your password. 8. Enter your e-mail address. You will receive e-mail notification when new information is available in [address] 9. Click Sign Up. You can now view and download portions of your medical record. 10. Click the Download Summary menu link to download a portable copy of your medical information. If you have questions, please visit the Frequently Asked Questions section of the [employer] website. Remember, [employer] is NOT to be used for urgent needs. For medical emergencies, dial 911. Now available from your iPhone and Android! Please provide this summary of care documentation to your next provider. Your primary care clinician is listed as Janel Salazar. If you have any questions after today's visit, please call [phone].

## 2017-10-26 NOTE — PROGRESS NOTES
HISTORY OF PRESENT ILLNESS  Alta Pichardo is a 64 y.o. female here for follow up. she is doing well she has lost approximately 10 pounds weight since I have seen her last time. Has hypertension blood pressure is under control. Still smoking,but less. no cough. Has elevated lipid. on statin. no myalgia. Need lipid panel. mammogram was abnormal.  Mammogram was normal.  She will follow-up she will have follow-up mammogram in 1 year. Refuses flu shot. colonoscopy was normal and up to date. Hypertension      Cholesterol Problem     Obesity         Review of Systems   Constitutional: Negative. HENT: Negative. Eyes: Negative. Respiratory: Negative. Cardiovascular: Negative. Gastrointestinal: Negative. Genitourinary: Negative. Musculoskeletal: Negative. Negative for falls. Skin: Negative. Neurological: Negative. Psychiatric/Behavioral: Negative. Negative for depression and suicidal ideas. Physical Exam   Constitutional: She appears well-developed and well-nourished. No distress. Neck: Normal range of motion. Neck supple. No JVD present. No thyromegaly present. Cardiovascular: Normal rate, regular rhythm, normal heart sounds and intact distal pulses. Pulmonary/Chest: Effort normal and breath sounds normal. No respiratory distress. She has no wheezes. Abdominal: Soft. Bowel sounds are normal. She exhibits no distension. There is no tenderness. Musculoskeletal: She exhibits no tenderness. ASSESSMENT and PLAN  Diagnoses and all orders for this visit:    1. Essential hypertension  On Ziac stable. -     METABOLIC PANEL, COMPREHENSIVE    2. Mixed hyperlipidemia  On statin. No myalgia. -     METABOLIC PANEL, COMPREHENSIVE  -     LIPID PANEL    3. Obesity (BMI 30.0-34.9)  Has lost 10 pound weight. Continue low cholesterol and low carbohydrate diet and exercise. 4. Pre-diabetes  On low-carb diet. Will check,  -     HEMOGLOBIN A1C WITH EAG    5.  Smoking  Has reduced smoking advised to quit smoking. 6. Vitamin D deficiency  -     VITAMIN D, 25 HYDROXY  -     ergocalciferol (ERGOCALCIFEROL) 50,000 unit capsule; Take 1 Cap by mouth every seven (7) days. 7. Constipation, unspecified constipation type  Advised to drink more fluid. High-fiber diet. Advised to stop taking Tums and replaced weight low-fat cottage cheese. Discussed expected course/resolution/complications of diagnosis in detail with patient. Medication risks/benefits/costs/interactions/alternatives discussed with patient. Pt was given an after visit summary which includes diagnoses, current medications & vitals. Pt expressed understanding with the diagnosis and plan.

## 2017-10-27 LAB
25(OH)D3+25(OH)D2 SERPL-MCNC: NORMAL NG/ML
ALBUMIN SERPL-MCNC: 4.4 G/DL (ref 3.6–4.8)
ALBUMIN/GLOB SERPL: 1.6 {RATIO} (ref 1.2–2.2)
ALP SERPL-CCNC: 113 IU/L (ref 39–117)
ALT SERPL-CCNC: 14 IU/L (ref 0–32)
AST SERPL-CCNC: 17 IU/L (ref 0–40)
BILIRUB SERPL-MCNC: 0.4 MG/DL (ref 0–1.2)
BUN SERPL-MCNC: 12 MG/DL (ref 8–27)
BUN/CREAT SERPL: 17 (ref 12–28)
CALCIUM SERPL-MCNC: 9.6 MG/DL (ref 8.7–10.3)
CHLORIDE SERPL-SCNC: 103 MMOL/L (ref 96–106)
CHOLEST SERPL-MCNC: 192 MG/DL (ref 100–199)
CO2 SERPL-SCNC: 27 MMOL/L (ref 18–29)
CREAT SERPL-MCNC: 0.71 MG/DL (ref 0.57–1)
EST. AVERAGE GLUCOSE BLD GHB EST-MCNC: 111 MG/DL
GFR SERPLBLD CREATININE-BSD FMLA CKD-EPI: 106 ML/MIN/1.73
GFR SERPLBLD CREATININE-BSD FMLA CKD-EPI: 92 ML/MIN/1.73
GLOBULIN SER CALC-MCNC: 2.8 G/DL (ref 1.5–4.5)
GLUCOSE SERPL-MCNC: 115 MG/DL (ref 65–99)
HBA1C MFR BLD: 5.5 % (ref 4.8–5.6)
HDLC SERPL-MCNC: 60 MG/DL
INTERPRETATION, 910389: NORMAL
LDLC SERPL CALC-MCNC: 118 MG/DL (ref 0–99)
POTASSIUM SERPL-SCNC: 4.5 MMOL/L (ref 3.5–5.2)
PROT SERPL-MCNC: 7.2 G/DL (ref 6–8.5)
SODIUM SERPL-SCNC: 143 MMOL/L (ref 134–144)
TRIGL SERPL-MCNC: 69 MG/DL (ref 0–149)
VLDLC SERPL CALC-MCNC: 14 MG/DL (ref 5–40)

## 2017-11-01 NOTE — PROGRESS NOTES
LDL slightly elevated,watch fatty food. Vitamin D labs work was canceled continue vitamin D 1000 units once a day for 4 months.   All other labs are stable

## 2018-01-22 DIAGNOSIS — E78.2 MIXED HYPERLIPIDEMIA: ICD-10-CM

## 2018-01-22 DIAGNOSIS — I10 ESSENTIAL HYPERTENSION: ICD-10-CM

## 2018-01-22 RX ORDER — BISOPROLOL FUMARATE AND HYDROCHLOROTHIAZIDE 2.5; 6.25 MG/1; MG/1
TABLET ORAL
Qty: 90 TAB | Refills: 1 | Status: SHIPPED | OUTPATIENT
Start: 2018-01-22 | End: 2018-04-24 | Stop reason: DRUGHIGH

## 2018-01-22 RX ORDER — SIMVASTATIN 20 MG/1
TABLET, FILM COATED ORAL
Qty: 90 TAB | Refills: 1 | Status: SHIPPED | OUTPATIENT
Start: 2018-01-22 | End: 2018-04-17 | Stop reason: SDUPTHER

## 2018-04-17 ENCOUNTER — OFFICE VISIT (OUTPATIENT)
Dept: INTERNAL MEDICINE CLINIC | Age: 63
End: 2018-04-17

## 2018-04-17 VITALS
OXYGEN SATURATION: 99 % | HEIGHT: 66 IN | DIASTOLIC BLOOD PRESSURE: 90 MMHG | WEIGHT: 198.8 LBS | RESPIRATION RATE: 18 BRPM | TEMPERATURE: 98.2 F | HEART RATE: 62 BPM | SYSTOLIC BLOOD PRESSURE: 170 MMHG | BODY MASS INDEX: 31.95 KG/M2

## 2018-04-17 DIAGNOSIS — E55.9 VITAMIN D DEFICIENCY: ICD-10-CM

## 2018-04-17 DIAGNOSIS — Z11.59 ENCOUNTER FOR HEPATITIS C SCREENING TEST FOR LOW RISK PATIENT: ICD-10-CM

## 2018-04-17 DIAGNOSIS — I10 ESSENTIAL HYPERTENSION: ICD-10-CM

## 2018-04-17 DIAGNOSIS — E78.2 MIXED HYPERLIPIDEMIA: Primary | ICD-10-CM

## 2018-04-17 DIAGNOSIS — F17.200 SMOKING: ICD-10-CM

## 2018-04-17 DIAGNOSIS — E66.9 OBESITY (BMI 30.0-34.9): ICD-10-CM

## 2018-04-17 RX ORDER — SIMVASTATIN 20 MG/1
TABLET, FILM COATED ORAL
Qty: 90 TAB | Refills: 1 | Status: SHIPPED | OUTPATIENT
Start: 2018-04-17 | End: 2018-07-17 | Stop reason: SDUPTHER

## 2018-04-17 RX ORDER — BISOPROLOL FUMARATE AND HYDROCHLOROTHIAZIDE 10; 6.25 MG/1; MG/1
1 TABLET ORAL DAILY
Qty: 90 TAB | Refills: 3 | Status: SHIPPED | OUTPATIENT
Start: 2018-04-17 | End: 2018-08-27

## 2018-04-17 NOTE — MR AVS SNAPSHOT
[address] [facility] [address] [place] [address] 
[phone] Patient: Leno De Jesus MRN: [id number] Visit Information Date & Time Provider Department Dept. Phone Encounter #  
 2018  8:20 AM Felisha Rodriguez NP [facility] Internal Medicine [phone] [id number] Upcoming Health Maintenance Date Due Hepatitis C Screening 1955 Pneumococcal 19-64 Medium Risk (1 of 1 - PPSV23) 1974 FOBT Q 1 YEAR AGE 50-75 2005 ZOSTER VACCINE AGE 60> 2015 PAP AKA CERVICAL CYTOLOGY 2017 BREAST CANCER SCRN MAMMOGRAM 2019 DTaP/Tdap/Td series (2 - Td) 8/10/2026 Allergies as of 2018  Review Complete On: 2018 By: Felisha Rodriguez NP No Known Allergies Current Immunizations  Reviewed on 10/26/2017 Name Date Tdap 8/10/2016 Not reviewed this visit You Were Diagnosed With   
  
 Codes Comments Essential hypertension    -  Primary ICD-10-CM: I10 
ICD-9-CM: 401.9 Mixed hyperlipidemia     ICD-10-CM: E78.2 ICD-9-CM: 272.2 Obesity (BMI 30.0-34.9)     ICD-10-CM: P53.5 ICD-9-CM: 278.00 Vitamin D deficiency     ICD-10-CM: E55.9 ICD-9-CM: 268.9 Encounter for hepatitis C screening test for low risk patient     ICD-10-CM: Z11.59 
ICD-9-CM: V73.89 Vitals BP Pulse Temp Resp Height(growth percentile) Weight(growth percentile) 182/86 (BP 1 Location: Left arm, BP Patient Position: Sitting) 62 98.2 °F (36.8 °C) (Oral) 18 5' 6\" (1.676 m) 198 lb 12.8 oz (90.2 kg) SpO2 BMI OB Status Smoking Status 99% 32.09 kg/m2 Postmenopausal Current Every Day Smoker Vitals History BMI and BSA Data Body Mass Index Body Surface Area 32.09 kg/m 2 2.05 m 2 Preferred Pharmacy Pharmacy Name Phone [address] [facility] [employer] [phone] Your Updated Medication List  
  
   
 This list is accurate as of 4/17/18  8:40 AM.  Always use your most recent med list.  
  
  
  
  
 * bisoprolol-hydroCHLOROthiazide 2.5-6.25 mg per tablet Commonly known as:  Formerly [facility] TAKE ONE TABLET BY MOUTH ONCE DAILY * bisoprolol-hydroCHLOROthiazide 10-6.25 mg per tablet Commonly known as:  Formerly [facility] Take 1 Tab by mouth daily. calcium carbonate 200 mg calcium (500 mg) Chew Commonly known as:  TUMS Take 1 Tab by mouth daily. ergocalciferol 50,000 unit capsule Commonly known as:  ERGOCALCIFEROL Take 1 Cap by mouth every seven (7) days. simvastatin 20 mg tablet Commonly known as:  ZOCOR  
TAKE ONE TABLET BY MOUTH NIGHTLY  
  
 VALTREX 500 mg tablet Generic drug:  valACYclovir Take  by mouth two (2) times a day. * Notice: This list has 2 medication(s) that are the same as other medications prescribed for you. Read the directions carefully, and ask your doctor or other care provider to review them with you. Prescriptions Sent to Pharmacy Refills  
 bisoprolol-hydroCHLOROthiazide (ZIAC) 10-6.25 mg per tablet 3 Sig: Take 1 Tab by mouth daily. Class: Normal  
 Pharmacy: [address] [place] [address] Ph #: [phone] Route: Oral  
 simvastatin (ZOCOR) 20 mg tablet 1 Sig: TAKE ONE TABLET BY MOUTH NIGHTLY Class: Normal  
 Pharmacy: [address] [place] [address] Ph #: [phone] We Performed the Following CBC W/O DIFF [22893 CPT(R)] HEPATITIS C AB [74706 CPT(R)] LIPID PANEL [02021 CPT(R)] METABOLIC PANEL, COMPREHENSIVE [09961 CPT(R)] VITAMIN D, 25 HYDROXY [id number] CPT(R)] Introducing [address]! [facility] introduces [employer] patient portal. Now you can access parts of your medical record, email your doctor's office, and request medication refills online. 1. In your internet browser, go to [email or web address] 2. Click on the First Time User? Click Here link in the Sign In box. You will see the New Member Sign Up page. 3. Enter your [employer] Access Code exactly as it appears below. You will not need to use this code after youve completed the sign-up process. If you do not sign up before the expiration date, you must request a new code. · [employer] Access Code: [email or web address] Expires: 7/16/2018  8:40 AM 
 
4. Enter the last four digits of your Social Security Number (xxxx) and Date of Birth (mm/dd/yyyy) as indicated and click Submit. You will be taken to the next sign-up page. 5. Create a [employer] ID. This will be your [employer] login ID and cannot be changed, so think of one that is secure and easy to remember. 6. Create a [employer] password. You can change your password at any time. 7. Enter your Password Reset Question and Answer. This can be used at a later time if you forget your password. 8. Enter your e-mail address. You will receive e-mail notification when new information is available in [address] 9. Click Sign Up. You can now view and download portions of your medical record. 10. Click the Download Summary menu link to download a portable copy of your medical information. If you have questions, please visit the Frequently Asked Questions section of the [employer] website. Remember, [employer] is NOT to be used for urgent needs. For medical emergencies, dial 911. Now available from your iPhone and Android! Please provide this summary of care documentation to your next provider. Your primary care clinician is listed as Minna Barreto. If you have any questions after today's visit, please call [phone].

## 2018-04-17 NOTE — PROGRESS NOTES
Health Maintenance Due   Topic Date Due    Hepatitis C Screening  1955    Pneumococcal 19-64 Medium Risk (1 of 1 - PPSV23) 11/18/1974    FOBT Q 1 YEAR AGE 50-75  11/18/2005    ZOSTER VACCINE AGE 60>  09/18/2015    PAP AKA CERVICAL CYTOLOGY  07/02/2017       Chief Complaint   Patient presents with    Medication Evaluation     Refill    Colon Cancer Screening     FOBT       1. Have you been to the ER, urgent care clinic since your last visit? Hospitalized since your last visit? No    2. Have you seen or consulted any other health care providers outside of the [facility] since your last visit? Include any pap smears or colon screening. No    3) Do you have an Advance Directive on file? no    4) Are you interested in receiving information on Advance Directives? NO      Patient is accompanied by self I have received verbal consent from Rudy Bolivar to discuss any/all medical information while they are present in the room.

## 2018-04-17 NOTE — PATIENT INSTRUCTIONS
DASH Diet: Care Instructions  Your Care Instructions    The DASH diet is an eating plan that can help lower your blood pressure. DASH stands for Dietary Approaches to Stop Hypertension. Hypertension is high blood pressure. The DASH diet focuses on eating foods that are high in calcium, potassium, and magnesium. These nutrients can lower blood pressure. The foods that are highest in these nutrients are fruits, vegetables, low-fat dairy products, nuts, seeds, and legumes. But taking calcium, potassium, and magnesium supplements instead of eating foods that are high in those nutrients does not have the same effect. The DASH diet also includes whole grains, fish, and poultry. The DASH diet is one of several lifestyle changes your doctor may recommend to lower your high blood pressure. Your doctor may also want you to decrease the amount of sodium in your diet. Lowering sodium while following the DASH diet can lower blood pressure even further than just the DASH diet alone. Follow-up care is a key part of your treatment and safety. Be sure to make and go to all appointments, and call your doctor if you are having problems. It's also a good idea to know your test results and keep a list of the medicines you take. How can you care for yourself at home? Following the DASH diet  · Eat 4 to 5 servings of fruit each day. A serving is 1 medium-sized piece of fruit, ½ cup chopped or canned fruit, 1/4 cup dried fruit, or 4 ounces (½ cup) of fruit juice. Choose fruit more often than fruit juice. · Eat 4 to 5 servings of vegetables each day. A serving is 1 cup of lettuce or raw leafy vegetables, ½ cup of chopped or cooked vegetables, or 4 ounces (½ cup) of vegetable juice. Choose vegetables more often than vegetable juice. · Get 2 to 3 servings of low-fat and fat-free dairy each day. A serving is 8 ounces of milk, 1 cup of yogurt, or 1 ½ ounces of cheese. · Eat 6 to 8 servings of grains each day.  A serving is 1 slice of bread, 1 ounce of dry cereal, or ½ cup of cooked rice, pasta, or cooked cereal. Try to choose whole-grain products as much as possible. · Limit lean meat, poultry, and fish to 2 servings each day. A serving is 3 ounces, about the size of a deck of cards. · Eat 4 to 5 servings of nuts, seeds, and legumes (cooked dried beans, lentils, and split peas) each week. A serving is 1/3 cup of nuts, 2 tablespoons of seeds, or ½ cup of cooked beans or peas. · Limit fats and oils to 2 to 3 servings each day. A serving is 1 teaspoon of vegetable oil or 2 tablespoons of salad dressing. · Limit sweets and added sugars to 5 servings or less a week. A serving is 1 tablespoon jelly or jam, ½ cup sorbet, or 1 cup of lemonade. · Eat less than 2,300 milligrams (mg) of sodium a day. If you limit your sodium to 1,500 mg a day, you can lower your blood pressure even more. Tips for success  · Start small. Do not try to make dramatic changes to your diet all at once. You might feel that you are missing out on your favorite foods and then be more likely to not follow the plan. Make small changes, and stick with them. Once those changes become habit, add a few more changes. · Try some of the following:  ¨ Make it a goal to eat a fruit or vegetable at every meal and at snacks. This will make it easy to get the recommended amount of fruits and vegetables each day. ¨ Try yogurt topped with fruit and nuts for a snack or healthy dessert. ¨ Add lettuce, tomato, cucumber, and onion to sandwiches. ¨ Combine a ready-made pizza crust with low-fat mozzarella cheese and lots of vegetable toppings. Try using tomatoes, squash, spinach, broccoli, carrots, cauliflower, and onions. ¨ Have a variety of cut-up vegetables with a low-fat dip as an appetizer instead of chips and dip. ¨ Sprinkle sunflower seeds or chopped almonds over salads. Or try adding chopped walnuts or almonds to cooked vegetables.   ¨ Try some vegetarian meals using beans and peas. Add garbanzo or kidney beans to salads. Make burritos and tacos with mashed snow beans or black beans. Where can you learn more? Go to http://chip-chetan.info/. Enter N244 in the search box to learn more about \"DASH Diet: Care Instructions. \"  Current as of: September 21, 2016  Content Version: 11.4  © 9373-0807 [employer]. Care instructions adapted under license by [employer] (which disclaims liability or warranty for this information). If you have questions about a medical condition or this instruction, always ask your healthcare professional. [place] [address] any warranty or liability for your use of this information. High Cholesterol: Care Instructions  Your Care Instructions    Cholesterol is a type of fat in your blood. It is needed for many body functions, such as making new cells. Cholesterol is made by your body. It also comes from food you eat. High cholesterol means that you have too much of the fat in your blood. This raises your risk of a heart attack and stroke. LDL and HDL are part of your total cholesterol. LDL is the \"bad\" cholesterol. High LDL can raise your risk for heart disease, heart attack, and stroke. HDL is the \"good\" cholesterol. It helps clear bad cholesterol from the body. High HDL is linked with a lower risk of heart disease, heart attack, and stroke. Your cholesterol levels help your doctor find out your risk for having a heart attack or stroke. You and your doctor can talk about whether you need to lower your risk and what treatment is best for you. A heart-healthy lifestyle along with medicines can help lower your cholesterol and your risk. The way you choose to lower your risk will depend on how high your risk is for heart attack and stroke. It will also depend on how you feel about taking medicines. Follow-up care is a key part of your treatment and safety.  Be sure to make and go to all appointments, and call your doctor if you are having problems. It's also a good idea to know your test results and keep a list of the medicines you take. How can you care for yourself at home? · Eat a variety of foods every day. Good choices include fruits, vegetables, whole grains (like oatmeal), dried beans and peas, nuts and seeds, soy products (like tofu), and fat-free or low-fat dairy products. · Replace butter, margarine, and hydrogenated or partially hydrogenated oils with olive and canola oils. (Canola oil margarine without trans fat is fine.)  · Replace red meat with fish, poultry, and soy protein (like tofu). · Limit processed and packaged foods like chips, crackers, and cookies. · Bake, broil, or steam foods. Don't jones them. · Be physically active. Get at least 30 minutes of exercise on most days of the week. Walking is a good choice. You also may want to do other activities, such as running, swimming, cycling, or playing tennis or team sports. · Stay at a healthy weight or lose weight by making the changes in eating and physical activity listed above. Losing just a small amount of weight, even 5 to 10 pounds, can reduce your risk for having a heart attack or stroke. · Do not smoke. When should you call for help? Watch closely for changes in your health, and be sure to contact your doctor if:  ? · You need help making lifestyle changes. ? · You have questions about your medicine. Where can you learn more? Go to http://chip-chetan.info/. Enter Y445 in the search box to learn more about \"High Cholesterol: Care Instructions. \"  Current as of: September 21, 2016  Content Version: 11.4  © 4460-9459 [employer]LM. Care instructions adapted under license by [employer] (which disclaims liability or warranty for this information).  If you have questions about a medical condition or this instruction, always ask your healthcare professional. Antione Antonette disclaims any warranty or liability for your use of this information. Learning About Benefits From Quitting Smoking  How does quitting smoking make you healthier? If you're thinking about quitting smoking, you may have a few reasons to be smoke-free. Your health may be one of them. · When you quit smoking, you lower your risks for cancer, lung disease, heart attack, stroke, blood vessel disease, and blindness from macular degeneration. · When you're smoke-free, you get sick less often, and you heal faster. You are less likely to get colds, flu, bronchitis, and pneumonia. · As a nonsmoker, you may find that your mood is better and you are less stressed. When and how will you feel healthier? Quitting has real health benefits that start from day 1 of being smoke-free. And the longer you stay smoke-free, the healthier you get and the better you feel. The first hours  · After just 20 minutes, your blood pressure and heart rate go down. That means there's less stress on your heart and blood vessels. · Within 12 hours, the level of carbon monoxide in your blood drops back to normal. That makes room for more oxygen. With more oxygen in your body, you may notice that you have more energy than when you smoked. After 2 weeks  · Your lungs start to work better. · Your risk of heart attack starts to drop. After 1 month  · When your lungs are clear, you cough less and breathe deeper, so it's easier to be active. · Your sense of taste and smell return. That means you can enjoy food more than you have since you started smoking. Over the years  · After 1 year, your risk of heart disease is half what it would be if you kept smoking. · After 5 years, your risk of stroke starts to shrink. Within a few years after that, it's about the same as if you'd never smoked. · After 10 years, your risk of dying from lung cancer is cut by about half. And your risk for many other types of cancer is lower too.   How would quitting help others in your life? When you quit smoking, you improve the health of everyone who now breathes in your smoke. · Their heart, lung, and cancer risks drop, much like yours. · They are sick less. For babies and small children, living smoke-free means they're less likely to have ear infections, pneumonia, and bronchitis. · If you're a woman who is or will be pregnant someday, quitting smoking means a healthier . · Children who are close to you are less likely to become adult smokers. Where can you learn more? Go to http://chipConsumerBellchetan.info/. Enter 052 [id number] [id number] 11 in the search box to learn more about \"Learning About Benefits From Quitting Smoking. \"  Current as of: 2017  Content Version: 11.4  © 6735-7958 [employer]IEO. Care instructions adapted under license by [employer] (which disclaims liability or warranty for this information). If you have questions about a medical condition or this instruction, always ask your healthcare professional. [place] [address] any warranty or liability for your use of this information. Deciding About Using Medicines To Quit Smoking  Deciding About Using Medicines To Quit Smoking  What are the medicines you can use? Your doctor may prescribe varenicline (Chantix) or bupropion (Zyban). These medicines can help you cope with cravings for tobacco. They are pills that don't contain nicotine. You also can use nicotine replacement products. These do contain nicotine. There are many types. · Gum and lozenges slowly release nicotine into your mouth. · Patches stick to your skin. They slowly release nicotine into your bloodstream.  · An inhaler has a chang that contains nicotine. You breathe in a puff of nicotine vapor through your mouth and throat. · Nasal spray releases a mist that contains nicotine. What are key points about this decision?   · Using medicines can double your chances of quitting smoking. They can ease cravings and withdrawal symptoms. · Getting counseling along with using medicine can raise your chances of quitting even more. · If you smoke fewer than 5 cigarettes a day, you may not need medicines to help you quit smoking. · These medicines have less nicotine than cigarettes. And by itself, nicotine is not nearly as harmful as smoking. The tars, carbon monoxide, and other toxic chemicals in tobacco cause the harmful effects. · The side effects of nicotine replacement products depend on the type of product. For example, a patch can make your skin red and itchy. Medicines in pill form can make you sick to your stomach. They can also cause dry mouth and trouble sleeping. For most people, the side effects are not bad enough to make them stop using the products. Why might you choose to use medicines to quit smoking? · You have tried on your own to stop smoking, but you were not able to stop. · You smoke more than 5 cigarettes a day. · You want to increase your chances of quitting smoking. · You want to reduce your cravings and withdrawal symptoms. · You feel the benefits of medicine outweigh the side effects. Why might you choose not to use medicine? · You want to try quitting on your own by stopping all at once (\"cold turkey\"). · You want to cut back slowly on the number of cigarettes you smoke. · You smoke fewer than 5 cigarettes a day. · You do not like using medicine. · You feel the side effects of medicines outweigh the benefits. · You are worried about the cost of medicines. Your decision  Thinking about the facts and your feelings can help you make a decision that is right for you. Be sure you understand the benefits and risks of your options, and think about what else you need to do before you make the decision. Where can you learn more? Go to http://chip-chetan.info/.   Enter O432 in the search box to learn more about \"Deciding About Using Medicines To Quit Smoking. \"  Current as of: March 20, 2017  Content Version: 11.4  © 5149-0729 [employer]wise[employer]. Care instructions adapted under license by [employer] (which disclaims liability or warranty for this information). If you have questions about a medical condition or this instruction, always ask your healthcare professional. [place] [address] any warranty or liability for your use of this information.

## 2018-04-17 NOTE — LETTER
4/18/2018 2:52 PM 
 
Ms. Tea Anderson [id number] [place] [id number] Dear Tea Anderson: 
 
Please find your most recent results below. Resulted Orders METABOLIC PANEL, COMPREHENSIVE Result Value Ref Range Glucose 111 (H) 65 - 99 mg/dL BUN 11 8 - 27 mg/dL Creatinine 0.66 0.57 - 1.00 mg/dL GFR est non-AA 95 >59 mL/min/1.73 GFR est  >59 mL/min/1.73  
 BUN/Creatinine ratio 17 12 - 28 Sodium 144 134 - 144 mmol/L Potassium 4.3 3.5 - 5.2 mmol/L Chloride 104 96 - 106 mmol/L  
 CO2 25 18 - 29 mmol/L Calcium 9.8 8.7 - 10.3 mg/dL Protein, total 6.7 6.0 - 8.5 g/dL Albumin 4.2 3.6 - 4.8 g/dL GLOBULIN, TOTAL 2.5 1.5 - 4.5 g/dL A-G Ratio 1.7 1.2 - 2.2 Bilirubin, total 0.4 0.0 - 1.2 mg/dL Alk. phosphatase 95 39 - 117 IU/L  
 AST (SGOT) 18 0 - 40 IU/L  
 ALT (SGPT) 13 0 - 32 IU/L Narrative Performed at:  [address]  [id number] : Sangeetha Norwood MD, Phone:  [phone] LIPID PANEL Result Value Ref Range Cholesterol, total 173 100 - 199 mg/dL Triglyceride 89 0 - 149 mg/dL HDL Cholesterol 55 >39 mg/dL VLDL, calculated 18 5 - 40 mg/dL LDL, calculated 100 (H) 0 - 99 mg/dL Narrative Performed at:  [address]  [id number] : Sangeetha Norwood MD, Phone:  [phone] CVD REPORT Result Value Ref Range INTERPRETATION Note Comment:  
   Supplemental report is available. Narrative Performed at:  [address] [place] [address]  [id number] : Tylor Albrecht PhD, Phone:  [phone] RECOMMENDATIONS: 
 
Labs stable. Please call me if you have any questions: [phone] Sincerely, Chelita Giles NP

## 2018-04-17 NOTE — PROGRESS NOTES
Subjective:     Chief Complaint   Patient presents with    Medication Evaluation     Refill    Colon Cancer Screening     FOBT       History of Present Illness    Jesse Lr is a 58y.o. year old female who is a patient of Dr. Jules Romero that presents today for follow-up. She has a hx of HTN, hyperlipidemia, and vitamin d deficiency. Her BP is elevated in office today. She states that is due to her daughter leaving for [place] for vacation and she is very anxious. She also states she smoked a cigarette this morning. She reports compliance with her BP medication. Manual BP reading by myself was 170/90. She denies any associated symptoms. She is a smoker and denies daily exercise. She also has a hx of vitamin d deficiency. She was prescribed vitamin D 50,000 units weekly x 12 weeks. After completion of 12 weeks, recommend OTC Vitamin D3 1000 units daily. However, she states she is not compliant with the vitamin d. She denies any other new complaints at this time. No CP, SOB, GI, or  symptoms. Reviewed medications, recent lab work and imaging with patient. Pt reports compliance with medications. She is due for labs. Current Outpatient Prescriptions on File Prior to Visit   Medication Sig Dispense Refill    simvastatin (ZOCOR) 20 mg tablet TAKE ONE TABLET BY MOUTH NIGHTLY 90 Tab 1    bisoprolol-hydroCHLOROthiazide (ZIAC) 2.5-6.25 mg per tablet TAKE ONE TABLET BY MOUTH ONCE DAILY 90 Tab 1    ergocalciferol (ERGOCALCIFEROL) 50,000 unit capsule Take 1 Cap by mouth every seven (7) days. 4 Cap 2    diclofenac EC (VOLTAREN) 75 mg EC tablet Take 1 Tab by mouth two (2) times daily as needed. 30 Tab 0    varenicline (CHANTIX STARTER INÉS) 0.5 mg (11)- 1 mg (42) DsPk As directed 42 Tab 0    calcium carbonate (TUMS) 200 mg calcium (500 mg) chew Take 1 Tab by mouth daily.  valACYclovir (VALTREX) 500 mg tablet Take  by mouth two (2) times a day.        No current facility-administered medications on file prior to visit. No Known Allergies   Past Medical History:   Diagnosis Date    Herpes     High cholesterol     Hypercholesterolemia     Hypertension       History reviewed. No pertinent surgical history. Social History   Substance Use Topics    Smoking status: Current Every Day Smoker     Packs/day: 0.30     Years: 40.00     Types: Cigarettes    Smokeless tobacco: Never Used    Alcohol use 0.0 oz/week     0 Standard drinks or equivalent per week      Comment: Socially      Family History   Problem Relation Age of Onset    Alcohol abuse Sister     Cancer Maternal Aunt      bone ca        Objective:     Vitals:    04/17/18 0823   BP: 182/86   Pulse: 62   Resp: 18   Temp: 98.2 °F (36.8 °C)   TempSrc: Oral   SpO2: 99%   Weight: 198 lb 12.8 oz (90.2 kg)   Height: 5' 6\" (1.676 m)       Review of Systems   Constitutional: Negative. HENT: Negative. Eyes: Negative. Respiratory: Negative. Cardiovascular: Negative. Gastrointestinal: Negative. Genitourinary: Negative. Musculoskeletal: Negative. Skin: Negative. Neurological: Negative. Psychiatric/Behavioral: Negative. Physical Exam   Constitutional: She is oriented to person, place, and time. She appears well-developed and well-nourished. Well-appearing AA female. NAD   HENT:   Head: Normocephalic and atraumatic. Eyes: Conjunctivae and EOM are normal. Pupils are equal, round, and reactive to light. Neck: Normal range of motion. Neck supple. Cardiovascular: Normal rate, regular rhythm and normal heart sounds. Pulmonary/Chest: Effort normal and breath sounds normal. No respiratory distress. She has no wheezes. Abdominal: Soft. Bowel sounds are normal. She exhibits no distension. There is no tenderness. Musculoskeletal: Normal range of motion. She exhibits no edema, tenderness or deformity. Neurological: She is alert and oriented to person, place, and time. Skin: Skin is warm and dry. No rash noted.  No erythema. No pallor. Psychiatric: Her behavior is normal. Her mood appears anxious. Nursing note and vitals reviewed. Assessment/ Plan:   Diagnoses and all orders for this visit:    1. Mixed hyperlipidemia   Will order  -     simvastatin (ZOCOR) 20 mg tablet; TAKE ONE TABLET BY MOUTH NIGHTLY  -     CBC W/O DIFF  -     METABOLIC PANEL, COMPREHENSIVE  -     LIPID PANEL    2. Essential hypertension  . Will order  -     bisoprolol-hydroCHLOROthiazide [facility]) 10-6.25 mg per tablet; Take 1 Tab by mouth daily.  -     CBC W/O DIFF  -     METABOLIC PANEL, COMPREHENSIVE  -     LIPID PANEL    3. Obesity (BMI 30.0-34. 9)   Will order  -     CBC W/O DIFF  -     METABOLIC PANEL, COMPREHENSIVE  -     LIPID PANEL    4. Vitamin D deficiency   Will order  -     VITAMIN D, 25 HYDROXY    5. Encounter for hepatitis C screening test for low risk patient   Will order   -     HEPATITIS C AB    Patient's plan of care has been reviewed with them. Patient and/or family have verbally conveyed their understanding and agreement of the patient's signs, symptoms, diagnosis, treatment and prognosis and additionally agree to follow up as recommended or return to [facility] should their condition change prior to follow-up. Discharge instructions have also been provided to the patient with some educational information regarding their diagnosis as well a list of reasons why they would want to return to the office prior to their follow-up appointment should their condition change. Follow-up with Dr. Cale Kitchen as scheduled.

## 2018-04-18 DIAGNOSIS — R73.09 ELEVATED GLUCOSE: Primary | ICD-10-CM

## 2018-04-18 LAB
ALBUMIN SERPL-MCNC: 4.2 G/DL (ref 3.6–4.8)
ALBUMIN/GLOB SERPL: 1.7 {RATIO} (ref 1.2–2.2)
ALP SERPL-CCNC: 95 IU/L (ref 39–117)
ALT SERPL-CCNC: 13 IU/L (ref 0–32)
AST SERPL-CCNC: 18 IU/L (ref 0–40)
BILIRUB SERPL-MCNC: 0.4 MG/DL (ref 0–1.2)
BUN SERPL-MCNC: 11 MG/DL (ref 8–27)
BUN/CREAT SERPL: 17 (ref 12–28)
CALCIUM SERPL-MCNC: 9.8 MG/DL (ref 8.7–10.3)
CHLORIDE SERPL-SCNC: 104 MMOL/L (ref 96–106)
CHOLEST SERPL-MCNC: 173 MG/DL (ref 100–199)
CO2 SERPL-SCNC: 25 MMOL/L (ref 18–29)
CREAT SERPL-MCNC: 0.66 MG/DL (ref 0.57–1)
GFR SERPLBLD CREATININE-BSD FMLA CKD-EPI: 109 ML/MIN/1.73
GFR SERPLBLD CREATININE-BSD FMLA CKD-EPI: 95 ML/MIN/1.73
GLOBULIN SER CALC-MCNC: 2.5 G/DL (ref 1.5–4.5)
GLUCOSE SERPL-MCNC: 111 MG/DL (ref 65–99)
HDLC SERPL-MCNC: 55 MG/DL
INTERPRETATION, 910389: NORMAL
LDLC SERPL CALC-MCNC: 100 MG/DL (ref 0–99)
POTASSIUM SERPL-SCNC: 4.3 MMOL/L (ref 3.5–5.2)
PROT SERPL-MCNC: 6.7 G/DL (ref 6–8.5)
SODIUM SERPL-SCNC: 144 MMOL/L (ref 134–144)
TRIGL SERPL-MCNC: 89 MG/DL (ref 0–149)
VLDLC SERPL CALC-MCNC: 18 MG/DL (ref 5–40)

## 2018-04-18 NOTE — LETTER
4/20/2018 3:00 PM 
 
Ms. Surekha Leung [id number] [facility] [address] Dear Surekha Leung: 
 
Please find your most recent results below. Resulted Orders HEMOGLOBIN A1C WITH EAG Result Value Ref Range Hemoglobin A1c 5.5 4.8 - 5.6 % Comment:  
            Pre-diabetes: 5.7 - 6.4 Diabetes: >6.4 Glycemic control for adults with diabetes: <7.0 Estimated average glucose 111 mg/dL Narrative Performed at:  [address]  [id number] : Live Rodrigues MD, Phone:  [phone] RECOMMENDATIONS: 
 
Stable Hgb A1c Please call me if you have any questions: [phone] Sincerely, Chris Tom NP

## 2018-04-20 LAB
EST. AVERAGE GLUCOSE BLD GHB EST-MCNC: 111 MG/DL
HBA1C MFR BLD: 5.5 % (ref 4.8–5.6)

## 2018-04-23 ENCOUNTER — TELEPHONE (OUTPATIENT)
Dept: INTERNAL MEDICINE CLINIC | Age: 63
End: 2018-04-23

## 2018-04-23 NOTE — TELEPHONE ENCOUNTER
Saw NP who wants to increase bp meds. Wants to consult Beryl Ahn about this before she has the new prescription filled.  Please call patient as soon as possible- she is out of her bp medicine and will need to  the prescription today with dr cox

## 2018-04-24 DIAGNOSIS — I10 ESSENTIAL HYPERTENSION: ICD-10-CM

## 2018-04-24 RX ORDER — BISOPROLOL FUMARATE AND HYDROCHLOROTHIAZIDE 5; 6.25 MG/1; MG/1
1 TABLET ORAL DAILY
Qty: 90 TAB | Refills: 1 | Status: SHIPPED | OUTPATIENT
Start: 2018-04-24 | End: 2018-07-17 | Stop reason: SDUPTHER

## 2018-04-24 NOTE — TELEPHONE ENCOUNTER
Call placed to pt and conferred with MD, can stay on original dose as long as diet is modified and exercise, monitor BP and pulse and return to see Dr Fausto Hidalgo in 1 month, pt aware and voiced understanding

## 2018-05-02 ENCOUNTER — APPOINTMENT (OUTPATIENT)
Dept: GENERAL RADIOLOGY | Age: 63
End: 2018-05-02
Attending: EMERGENCY MEDICINE
Payer: SELF-PAY

## 2018-05-02 ENCOUNTER — HOSPITAL ENCOUNTER (EMERGENCY)
Age: 63
Discharge: HOME | End: 2018-05-02
Attending: EMERGENCY MEDICINE
Payer: SELF-PAY

## 2018-05-02 VITALS
TEMPERATURE: 98.3 F | OXYGEN SATURATION: 99 % | SYSTOLIC BLOOD PRESSURE: 132 MMHG | BODY MASS INDEX: 33.36 KG/M2 | DIASTOLIC BLOOD PRESSURE: 78 MMHG | RESPIRATION RATE: 16 BRPM | HEIGHT: 64 IN | HEART RATE: 60 BPM | WEIGHT: 195.4 LBS

## 2018-05-02 DIAGNOSIS — R07.9 ACUTE CHEST PAIN: Primary | ICD-10-CM

## 2018-05-02 LAB
ALBUMIN SERPL-MCNC: 3.9 G/DL (ref 3.5–5)
ALBUMIN/GLOB SERPL: 1 {RATIO} (ref 1.1–2.2)
ALP SERPL-CCNC: 90 U/L (ref 45–117)
ALT SERPL-CCNC: 30 U/L (ref 12–78)
ANION GAP SERPL CALC-SCNC: 7 MMOL/L (ref 5–15)
APPEARANCE UR: CLEAR
AST SERPL-CCNC: 22 U/L (ref 15–37)
BACTERIA URNS QL MICRO: NEGATIVE /HPF
BASOPHILS # BLD: 0 K/UL (ref 0–0.1)
BASOPHILS NFR BLD: 0 % (ref 0–1)
BILIRUB SERPL-MCNC: 0.3 MG/DL (ref 0.2–1)
BILIRUB UR QL: NEGATIVE
BUN SERPL-MCNC: 11 MG/DL (ref 6–20)
BUN/CREAT SERPL: 13 (ref 12–20)
CALCIUM SERPL-MCNC: 9.3 MG/DL (ref 8.5–10.1)
CHLORIDE SERPL-SCNC: 104 MMOL/L (ref 97–108)
CO2 SERPL-SCNC: 27 MMOL/L (ref 21–32)
COLOR UR: ABNORMAL
CREAT SERPL-MCNC: 0.85 MG/DL (ref 0.55–1.02)
DIFFERENTIAL METHOD BLD: NORMAL
EOSINOPHIL # BLD: 0.1 K/UL (ref 0–0.4)
EOSINOPHIL NFR BLD: 2 % (ref 0–7)
EPITH CASTS URNS QL MICRO: ABNORMAL /LPF
ERYTHROCYTE [DISTWIDTH] IN BLOOD BY AUTOMATED COUNT: 12.4 % (ref 11.5–14.5)
GLOBULIN SER CALC-MCNC: 3.9 G/DL (ref 2–4)
GLUCOSE SERPL-MCNC: 121 MG/DL (ref 65–100)
GLUCOSE UR STRIP.AUTO-MCNC: NEGATIVE MG/DL
HCT VFR BLD AUTO: 37.8 % (ref 35–47)
HGB BLD-MCNC: 12.7 G/DL (ref 11.5–16)
HGB UR QL STRIP: ABNORMAL
HYALINE CASTS URNS QL MICRO: ABNORMAL /LPF (ref 0–5)
IMM GRANULOCYTES # BLD: 0 K/UL (ref 0–0.04)
IMM GRANULOCYTES NFR BLD AUTO: 0 % (ref 0–0.5)
KETONES UR QL STRIP.AUTO: NEGATIVE MG/DL
LEUKOCYTE ESTERASE UR QL STRIP.AUTO: ABNORMAL
LIPASE SERPL-CCNC: 132 U/L (ref 73–393)
LYMPHOCYTES # BLD: 1.5 K/UL (ref 0.8–3.5)
LYMPHOCYTES NFR BLD: 32 % (ref 12–49)
MCH RBC QN AUTO: 32 PG (ref 26–34)
MCHC RBC AUTO-ENTMCNC: 33.6 G/DL (ref 30–36.5)
MCV RBC AUTO: 95.2 FL (ref 80–99)
MONOCYTES # BLD: 0.4 K/UL (ref 0–1)
MONOCYTES NFR BLD: 9 % (ref 5–13)
NEUTS SEG # BLD: 2.5 K/UL (ref 1.8–8)
NEUTS SEG NFR BLD: 56 % (ref 32–75)
NITRITE UR QL STRIP.AUTO: NEGATIVE
NRBC # BLD: 0 K/UL (ref 0–0.01)
NRBC BLD-RTO: 0 PER 100 WBC
PH UR STRIP: 6 [PH] (ref 5–8)
PLATELET # BLD AUTO: 298 K/UL (ref 150–400)
PMV BLD AUTO: 10.1 FL (ref 8.9–12.9)
POTASSIUM SERPL-SCNC: 3.5 MMOL/L (ref 3.5–5.1)
PROT SERPL-MCNC: 7.8 G/DL (ref 6.4–8.2)
PROT UR STRIP-MCNC: NEGATIVE MG/DL
RBC # BLD AUTO: 3.97 M/UL (ref 3.8–5.2)
RBC #/AREA URNS HPF: ABNORMAL /HPF (ref 0–5)
SODIUM SERPL-SCNC: 138 MMOL/L (ref 136–145)
SP GR UR REFRACTOMETRY: 1.01 (ref 1–1.03)
TROPONIN I BLD-MCNC: <0.04 NG/ML (ref 0–0.08)
TROPONIN I SERPL-MCNC: <0.04 NG/ML
UROBILINOGEN UR QL STRIP.AUTO: 1 EU/DL (ref 0.2–1)
WBC # BLD AUTO: 4.5 K/UL (ref 3.6–11)
WBC URNS QL MICRO: ABNORMAL /HPF (ref 0–4)

## 2018-05-02 PROCEDURE — 93005 ELECTROCARDIOGRAM TRACING: CPT

## 2018-05-02 PROCEDURE — 36415 COLL VENOUS BLD VENIPUNCTURE: CPT | Performed by: EMERGENCY MEDICINE

## 2018-05-02 PROCEDURE — 83690 ASSAY OF LIPASE: CPT | Performed by: EMERGENCY MEDICINE

## 2018-05-02 PROCEDURE — 71046 X-RAY EXAM CHEST 2 VIEWS: CPT

## 2018-05-02 PROCEDURE — 84484 ASSAY OF TROPONIN QUANT: CPT | Performed by: EMERGENCY MEDICINE

## 2018-05-02 PROCEDURE — 85025 COMPLETE CBC W/AUTO DIFF WBC: CPT | Performed by: EMERGENCY MEDICINE

## 2018-05-02 PROCEDURE — 99284 EMERGENCY DEPT VISIT MOD MDM: CPT

## 2018-05-02 PROCEDURE — 81001 URINALYSIS AUTO W/SCOPE: CPT | Performed by: EMERGENCY MEDICINE

## 2018-05-02 PROCEDURE — 80053 COMPREHEN METABOLIC PANEL: CPT | Performed by: EMERGENCY MEDICINE

## 2018-05-02 NOTE — ED PROVIDER NOTES
HPI Comments: 58 y.o. female with past medical history significant for HTN and hypercholesterolemia, who presents ambulatory to the ED with cc of chest pain. Pt reports 1 episode of chest pain at ~3:00PM while she was seated at work. She describes the pain as a \"\" sensation to her chest, followed by a \"release\" and pressure to her chest and head. She reports associated BL arm numbness at onset of chest tightness and BL leg tingling at this time despite ambulating today. Pt reports prior to onset of symptoms, she had a tuna sub with onions but usually does not include onions on her subs, as well as ASA 325mg and Wal-Zyr, which she takes daily. She states hx of HTN that she is attempting to control with a healthy diet and notes her blood pressure was 137/85 today. Pt specifically denies any diaphoresis or SOB. There are no other acute medical concerns at this time. Social Hx: daily Tobacco use (0.3 PPD), social EtOH use, denies Illicit Drug use  PCP: Amy Ibrahim MD    Note written by Tony Sierra, as dictated by Karley Mazariegos MD 3:39 PM      The history is provided by the patient. No  was used. Past Medical History:   Diagnosis Date    Herpes     High cholesterol     Hypercholesterolemia     Hypertension        History reviewed. No pertinent surgical history. Family History:   Problem Relation Age of Onset    Alcohol abuse Sister     Cancer Maternal Aunt      bone ca       Social History     Social History    Marital status:      Spouse name: N/A    Number of children: N/A    Years of education: N/A     Occupational History    Not on file.      Social History Main Topics    Smoking status: Current Every Day Smoker     Packs/day: 0.30     Years: 40.00     Types: Cigarettes    Smokeless tobacco: Never Used    Alcohol use 0.0 oz/week     0 Standard drinks or equivalent per week      Comment: Socially    Drug use: No      Comment: used to smoke pot, had coccaine-long time ago    Sexual activity: Not Currently     Birth control/ protection: None      Comment: guerrero,2 chilldren,working in [employer]     Other Topics Concern    Not on file     Social History Narrative    ** Merged History Encounter **              ALLERGIES: Review of patient's allergies indicates no known allergies. Review of Systems   Constitutional: Negative for chills, diaphoresis and fever. HENT: Negative for ear pain and sore throat. Eyes: Negative for photophobia and pain. Respiratory: Negative for chest tightness and shortness of breath. Cardiovascular: Positive for chest pain. Negative for leg swelling. Gastrointestinal: Negative for abdominal pain, nausea and vomiting. Genitourinary: Negative for dysuria and flank pain. Musculoskeletal: Negative for back pain and neck pain. Skin: Negative for rash and wound. Neurological: Positive for numbness and headaches. Negative for dizziness and light-headedness. Vitals:    05/02/18 1516   BP: 166/87   Pulse: 78   Resp: 18   Temp: 98.4 °F (36.9 °C)   SpO2: 99%   Weight: 88.6 kg (195 lb 6.4 oz)   Height: 5' 4\" (1.626 m)            Physical Exam   Constitutional: She is oriented to person, place, and time. She appears well-developed and well-nourished. No distress. HENT:   Head: Normocephalic and atraumatic. Mouth/Throat: Oropharynx is clear and moist. No oropharyngeal exudate. Eyes: Conjunctivae and EOM are normal. Pupils are equal, round, and reactive to light. Neck: Normal range of motion. Neck supple. Cardiovascular: Normal rate, regular rhythm, normal heart sounds and intact distal pulses. No murmur heard. Pulmonary/Chest: Effort normal and breath sounds normal. No stridor. No respiratory distress. Abdominal: Soft. Bowel sounds are normal. There is no tenderness. There is no rebound. Musculoskeletal: Normal range of motion. She exhibits no edema, tenderness or deformity. Neurological: She is alert and oriented to person, place, and time. No cranial nerve deficit. Skin: She is not diaphoretic. Psychiatric: She has a normal mood and affect. Nursing note and vitals reviewed. MDM  Number of Diagnoses or Management Options  Acute chest pain:   Diagnosis management comments: patient with acute chest pain - likely esophageal spasm or GERD but will need to r/o ACS with EKG, CXR, troponins - patient took 325mg aspirin just before the pain started. Repeat trop neg - d/c home with instructions to see her PCP - advised to treat as gi cause of pain       Amount and/or Complexity of Data Reviewed  Clinical lab tests: ordered and reviewed  Tests in the radiology section of CPT®: ordered and reviewed          ED Course       Procedures    ED EKG interpretation: 1523  NSR, rate 69, ST & T wave abnormality, consider inferior ischemia/consider anterolateral ischemia. Essentially unchanged from 11/25/12.   Note written by Tony Hodges, as dictated by Eleuterio Gomez MD 3:24 PM              VITALS:   Patient Vitals for the past 8 hrs:   Temp Pulse Resp BP SpO2   05/02/18 1737 98.3 °F (36.8 °C) 60 16 132/78 99 %   05/02/18 1516 98.4 °F (36.9 °C) 78 18 166/87 99 %                  Recent Results (from the past 24 hour(s))   EKG, 12 LEAD, INITIAL    Collection Time: 05/02/18  3:23 PM   Result Value Ref Range    Ventricular Rate 69 BPM    Atrial Rate 69 BPM    P-R Interval 134 ms    QRS Duration 74 ms    Q-T Interval 402 ms    QTC Calculation (Bezet) 430 ms    Calculated P Axis 55 degrees    Calculated R Axis 24 degrees    Calculated T Axis -41 degrees    Diagnosis       Normal sinus rhythm  ST & T wave abnormality, consider inferior ischemia  ST & T wave abnormality, consider anterolateral ischemia  When compared with ECG of 25-NOV-2012 05:12,  No significant change was found     TROPONIN I    Collection Time: 05/02/18  3:26 PM   Result Value Ref Range    Troponin-I, Qt. <0.04 <0.05 ng/mL   LIPASE    Collection Time: 05/02/18  3:26 PM   Result Value Ref Range    Lipase 132 73 - 393 U/L   CBC WITH AUTOMATED DIFF    Collection Time: 05/02/18  3:26 PM   Result Value Ref Range    WBC 4.5 3.6 - 11.0 K/uL    RBC 3.97 3.80 - 5.20 M/uL    HGB 12.7 11.5 - 16.0 g/dL    HCT 37.8 35.0 - 47.0 %    MCV 95.2 80.0 - 99.0 FL    MCH 32.0 26.0 - 34.0 PG    MCHC 33.6 30.0 - 36.5 g/dL    RDW 12.4 11.5 - 14.5 %    PLATELET 549 510 - 162 K/uL    MPV 10.1 8.9 - 12.9 FL    NRBC 0.0 0  WBC    ABSOLUTE NRBC 0.00 0.00 - 0.01 K/uL    NEUTROPHILS 56 32 - 75 %    LYMPHOCYTES 32 12 - 49 %    MONOCYTES 9 5 - 13 %    EOSINOPHILS 2 0 - 7 %    BASOPHILS 0 0 - 1 %    IMMATURE GRANULOCYTES 0 0.0 - 0.5 %    ABS. NEUTROPHILS 2.5 1.8 - 8.0 K/UL    ABS. LYMPHOCYTES 1.5 0.8 - 3.5 K/UL    ABS. MONOCYTES 0.4 0.0 - 1.0 K/UL    ABS. EOSINOPHILS 0.1 0.0 - 0.4 K/UL    ABS. BASOPHILS 0.0 0.0 - 0.1 K/UL    ABS. IMM. GRANS. 0.0 0.00 - 0.04 K/UL    DF AUTOMATED     METABOLIC PANEL, COMPREHENSIVE    Collection Time: 05/02/18  3:26 PM   Result Value Ref Range    Sodium 138 136 - 145 mmol/L    Potassium 3.5 3.5 - 5.1 mmol/L    Chloride 104 97 - 108 mmol/L    CO2 27 21 - 32 mmol/L    Anion gap 7 5 - 15 mmol/L    Glucose 121 (H) 65 - 100 mg/dL    BUN 11 6 - 20 MG/DL    Creatinine 0.85 0.55 - 1.02 MG/DL    BUN/Creatinine ratio 13 12 - 20      GFR est AA >60 >60 ml/min/1.73m2    GFR est non-AA >60 >60 ml/min/1.73m2    Calcium 9.3 8.5 - 10.1 MG/DL    Bilirubin, total 0.3 0.2 - 1.0 MG/DL    ALT (SGPT) 30 12 - 78 U/L    AST (SGOT) 22 15 - 37 U/L    Alk.  phosphatase 90 45 - 117 U/L    Protein, total 7.8 6.4 - 8.2 g/dL    Albumin 3.9 3.5 - 5.0 g/dL    Globulin 3.9 2.0 - 4.0 g/dL    A-G Ratio 1.0 (L) 1.1 - 2.2     URINALYSIS W/ RFLX MICROSCOPIC    Collection Time: 05/02/18  3:50 PM   Result Value Ref Range    Color YELLOW/STRAW      Appearance CLEAR CLEAR      Specific gravity 1.014 1.003 - 1.030      pH (UA) 6.0 5.0 - 8.0      Protein NEGATIVE  NEG mg/dL    Glucose NEGATIVE  NEG mg/dL    Ketone NEGATIVE  NEG mg/dL    Bilirubin NEGATIVE  NEG      Blood SMALL (A) NEG      Urobilinogen 1.0 0.2 - 1.0 EU/dL    Nitrites NEGATIVE  NEG      Leukocyte Esterase TRACE (A) NEG      WBC 0-4 0 - 4 /hpf    RBC 5-10 0 - 5 /hpf    Epithelial cells FEW FEW /lpf    Bacteria NEGATIVE  NEG /hpf    Hyaline cast 0-2 0 - 5 /lpf   POC TROPONIN-I    Collection Time: 05/02/18  4:38 PM   Result Value Ref Range    Troponin-I (POC) <0.04 0.00 - 0.08 ng/mL       Xr Chest Pa Lat    Result Date: 5/2/2018  CLINICAL HISTORY: Chest pain , chest tightness while sitting at desk at work today. Sensation of tachycardia. INDICATION: Chest pain COMPARISON: 11/25/2012 FINDINGS: PA and lateral views of the chest are obtained. The cardiopericardial silhouette is within normal limits. There is no pleural effusion, pneumothorax or focal consolidation present. IMPRESSION: No acute intrathoracic disease.

## 2018-05-02 NOTE — ED TRIAGE NOTES
Pt complains of chest tightness while sitting at her desk at work today. States it felt like her heart was beating faster. Also complains of nausea. Denies shortness of breath and vomiting. Also complains of numbness to bilat. arms at the same time of chest tightness onset. Onset about 3p per pt. Currently denies chest tightness but continues with bilat. arm numbness. Note swelling to ankles and feet.

## 2018-05-02 NOTE — DISCHARGE INSTRUCTIONS
Chest Pain: Care Instructions  Your Care Instructions    There are many things that can cause chest pain. Some are not serious and will get better on their own in a few days. But some kinds of chest pain need more testing and treatment. Your doctor may have recommended a follow-up visit in the next 8 to 12 hours. If you are not getting better, you may need more tests or treatment. Even though your doctor has released you, you still need to watch for any problems. The doctor carefully checked you, but sometimes problems can develop later. If you have new symptoms or if your symptoms do not get better, get medical care right away. If you have worse or different chest pain or pressure that lasts more than 5 minutes or you passed out (lost consciousness), call 911 or seek other emergency help right away. A medical visit is only one step in your treatment. Even if you feel better, you still need to do what your doctor recommends, such as going to all suggested follow-up appointments and taking medicines exactly as directed. This will help you recover and help prevent future problems. How can you care for yourself at home? · Rest until you feel better. · Take your medicine exactly as prescribed. Call your doctor if you think you are having a problem with your medicine. · Do not drive after taking a prescription pain medicine. When should you call for help? Call 911 if:  ? · You passed out (lost consciousness). ? · You have severe difficulty breathing. ? · You have symptoms of a heart attack. These may include:  ¨ Chest pain or pressure, or a strange feeling in your chest.  ¨ Sweating. ¨ Shortness of breath. ¨ Nausea or vomiting. ¨ Pain, pressure, or a strange feeling in your back, neck, jaw, or upper belly or in one or both shoulders or arms. ¨ Lightheadedness or sudden weakness. ¨ A fast or irregular heartbeat.   After you call 911, the  may tell you to chew 1 adult-strength or 2 to 4 low-dose aspirin. Wait for an ambulance. Do not try to drive yourself. ?Call your doctor today if:  ? · You have any trouble breathing. ? · Your chest pain gets worse. ? · You are dizzy or lightheaded, or you feel like you may faint. ? · You are not getting better as expected. ? · You are having new or different chest pain. Where can you learn more? Go to http://chip-chetan.info/. Enter A120 in the search box to learn more about \"Chest Pain: Care Instructions. \"  Current as of: March 20, 2017  Content Version: 11.4  © 6489-9455 [employer]. Care instructions adapted under license by [employer] (which disclaims liability or warranty for this information). If you have questions about a medical condition or this instruction, always ask your healthcare professional. [address] any warranty or liability for your use of this information.

## 2018-05-02 NOTE — ED NOTES

## 2018-05-03 LAB
ATRIAL RATE: 69 BPM
CALCULATED P AXIS, ECG09: 55 DEGREES
CALCULATED R AXIS, ECG10: 24 DEGREES
CALCULATED T AXIS, ECG11: -41 DEGREES
DIAGNOSIS, 93000: NORMAL
P-R INTERVAL, ECG05: 134 MS
Q-T INTERVAL, ECG07: 402 MS
QRS DURATION, ECG06: 74 MS
QTC CALCULATION (BEZET), ECG08: 430 MS
VENTRICULAR RATE, ECG03: 69 BPM

## 2018-07-17 DIAGNOSIS — E78.2 MIXED HYPERLIPIDEMIA: ICD-10-CM

## 2018-07-17 RX ORDER — BISOPROLOL FUMARATE AND HYDROCHLOROTHIAZIDE 5; 6.25 MG/1; MG/1
1 TABLET ORAL DAILY
Qty: 90 TAB | Refills: 1 | Status: SHIPPED | OUTPATIENT
Start: 2018-07-17 | End: 2018-07-23 | Stop reason: SDUPTHER

## 2018-07-17 RX ORDER — SIMVASTATIN 20 MG/1
TABLET, FILM COATED ORAL
Qty: 90 TAB | Refills: 1 | Status: SHIPPED | OUTPATIENT
Start: 2018-07-17 | End: 2019-04-16 | Stop reason: SDUPTHER

## 2018-07-17 NOTE — TELEPHONE ENCOUNTER
Patient has 6 days left and will need a partial refill before her appointment on Aug 1.  Please call patient if any issues at [phone]

## 2018-07-23 RX ORDER — BISOPROLOL FUMARATE AND HYDROCHLOROTHIAZIDE 5; 6.25 MG/1; MG/1
1 TABLET ORAL DAILY
Qty: 90 TAB | Refills: 1 | Status: SHIPPED | OUTPATIENT
Start: 2018-07-23 | End: 2020-01-16

## 2018-08-27 ENCOUNTER — OFFICE VISIT (OUTPATIENT)
Dept: INTERNAL MEDICINE CLINIC | Age: 63
End: 2018-08-27

## 2018-08-27 VITALS
BODY MASS INDEX: 31.92 KG/M2 | HEART RATE: 61 BPM | HEIGHT: 64 IN | WEIGHT: 187 LBS | SYSTOLIC BLOOD PRESSURE: 150 MMHG | TEMPERATURE: 98.1 F | OXYGEN SATURATION: 99 % | DIASTOLIC BLOOD PRESSURE: 90 MMHG | RESPIRATION RATE: 20 BRPM

## 2018-08-27 DIAGNOSIS — I10 ESSENTIAL HYPERTENSION: Primary | ICD-10-CM

## 2018-08-27 DIAGNOSIS — H61.20 WAX IN EAR: ICD-10-CM

## 2018-08-27 DIAGNOSIS — J30.1 ALLERGIC RHINITIS DUE TO POLLEN, UNSPECIFIED SEASONALITY: ICD-10-CM

## 2018-08-27 DIAGNOSIS — K59.00 CONSTIPATION, UNSPECIFIED CONSTIPATION TYPE: ICD-10-CM

## 2018-08-27 DIAGNOSIS — E78.2 MIXED HYPERLIPIDEMIA: ICD-10-CM

## 2018-08-27 DIAGNOSIS — E66.9 OBESITY (BMI 30.0-34.9): ICD-10-CM

## 2018-08-27 NOTE — PATIENT INSTRUCTIONS
DASH Diet: Care Instructions  Your Care Instructions    The DASH diet is an eating plan that can help lower your blood pressure. DASH stands for Dietary Approaches to Stop Hypertension. Hypertension is high blood pressure. The DASH diet focuses on eating foods that are high in calcium, potassium, and magnesium. These nutrients can lower blood pressure. The foods that are highest in these nutrients are fruits, vegetables, low-fat dairy products, nuts, seeds, and legumes. But taking calcium, potassium, and magnesium supplements instead of eating foods that are high in those nutrients does not have the same effect. The DASH diet also includes whole grains, fish, and poultry. The DASH diet is one of several lifestyle changes your doctor may recommend to lower your high blood pressure. Your doctor may also want you to decrease the amount of sodium in your diet. Lowering sodium while following the DASH diet can lower blood pressure even further than just the DASH diet alone. Follow-up care is a key part of your treatment and safety. Be sure to make and go to all appointments, and call your doctor if you are having problems. It's also a good idea to know your test results and keep a list of the medicines you take. How can you care for yourself at home? Following the DASH diet  · Eat 4 to 5 servings of fruit each day. A serving is 1 medium-sized piece of fruit, ½ cup chopped or canned fruit, 1/4 cup dried fruit, or 4 ounces (½ cup) of fruit juice. Choose fruit more often than fruit juice. · Eat 4 to 5 servings of vegetables each day. A serving is 1 cup of lettuce or raw leafy vegetables, ½ cup of chopped or cooked vegetables, or 4 ounces (½ cup) of vegetable juice. Choose vegetables more often than vegetable juice. · Get 2 to 3 servings of low-fat and fat-free dairy each day. A serving is 8 ounces of milk, 1 cup of yogurt, or 1 ½ ounces of cheese. · Eat 6 to 8 servings of grains each day.  A serving is 1 slice of bread, 1 ounce of dry cereal, or ½ cup of cooked rice, pasta, or cooked cereal. Try to choose whole-grain products as much as possible. · Limit lean meat, poultry, and fish to 2 servings each day. A serving is 3 ounces, about the size of a deck of cards. · Eat 4 to 5 servings of nuts, seeds, and legumes (cooked dried beans, lentils, and split peas) each week. A serving is 1/3 cup of nuts, 2 tablespoons of seeds, or ½ cup of cooked beans or peas. · Limit fats and oils to 2 to 3 servings each day. A serving is 1 teaspoon of vegetable oil or 2 tablespoons of salad dressing. · Limit sweets and added sugars to 5 servings or less a week. A serving is 1 tablespoon jelly or jam, ½ cup sorbet, or 1 cup of lemonade. · Eat less than 2,300 milligrams (mg) of sodium a day. If you limit your sodium to 1,500 mg a day, you can lower your blood pressure even more. Tips for success  · Start small. Do not try to make dramatic changes to your diet all at once. You might feel that you are missing out on your favorite foods and then be more likely to not follow the plan. Make small changes, and stick with them. Once those changes become habit, add a few more changes. · Try some of the following:  ¨ Make it a goal to eat a fruit or vegetable at every meal and at snacks. This will make it easy to get the recommended amount of fruits and vegetables each day. ¨ Try yogurt topped with fruit and nuts for a snack or healthy dessert. ¨ Add lettuce, tomato, cucumber, and onion to sandwiches. ¨ Combine a ready-made pizza crust with low-fat mozzarella cheese and lots of vegetable toppings. Try using tomatoes, squash, spinach, broccoli, carrots, cauliflower, and onions. ¨ Have a variety of cut-up vegetables with a low-fat dip as an appetizer instead of chips and dip. ¨ Sprinkle sunflower seeds or chopped almonds over salads. Or try adding chopped walnuts or almonds to cooked vegetables.   ¨ Try some vegetarian meals using beans and peas. Add garbanzo or kidney beans to salads. Make burritos and tacos with mashed snow beans or black beans. Where can you learn more? Go to http://chip-chetan.info/. Enter U552 in the search box to learn more about \"DASH Diet: Care Instructions. \"  Current as of: December 6, 2017  Content Version: 11.7  © 9700-3080 [employer]. Care instructions adapted under license by [employer] (which disclaims liability or warranty for this information). If you have questions about a medical condition or this instruction, always ask your healthcare professional. [address] any warranty or liability for your use of this information. High-Fiber Diet: Care Instructions  Your Care Instructions    A high-fiber diet may help you relieve constipation and feel less bloated. Your doctor and dietitian will help you make a high-fiber eating plan based on your personal needs. The plan will include the things you like to eat. It will also make sure that you get 30 grams of fiber a day. Before you make changes to the way you eat, be sure to talk with your doctor or dietitian. Follow-up care is a key part of your treatment and safety. Be sure to make and go to all appointments, and call your doctor if you are having problems. It's also a good idea to know your test results and keep a list of the medicines you take. How can you care for yourself at home? · You can increase how much fiber you get if you eat more of certain foods. These foods include:  ¨ Whole-grain breads and cereals. ¨ Fruits, such as pears, apples, and peaches. Eat the skins, peels, and seeds, if you can. ¨ Vegetables, such as broccoli, cabbage, spinach, carrots, asparagus, and squash. ¨ Starchy vegetables. These include potatoes with skins, kidney beans, and lima beans. · Take a fiber supplement every day if your doctor recommends it.  Examples are Benefiber, Citrucel, FiberCon, and Metamucil. Ask your doctor how much to take. · Drink plenty of fluids, enough so that your urine is light yellow or clear like water. If you have kidney, heart, or liver disease and have to limit fluids, talk with your doctor before you increase the amount of fluids you drink. · Get some exercise every day. Exercise helps stool move through the colon. It also helps prevent constipation. · Keep a food diary. Try to notice and write down what foods cause gas, pain, or other symptoms. Then you can avoid these foods. Where can you learn more? Go to http://chip-chetan.info/. Enter V025 in the search box to learn more about \"High-Fiber Diet: Care Instructions. \"  Current as of: May 12, 2017  Content Version: 11.7  © 7114-4753 [employer]. Care instructions adapted under license by [employer] (which disclaims liability or warranty for this information). If you have questions about a medical condition or this instruction, always ask your healthcare professional. [address] any warranty or liability for your use of this information.

## 2018-08-27 NOTE — PROGRESS NOTES
Chief Complaint   Patient presents with    Sinus Pain    Nasal Discharge     runny nose    Constipation    Hypertension     1. Have you been to the ER, urgent care clinic since your last visit? Hospitalized since your last visit? YES-  [facility] [place]  ER    2. Have you seen or consulted any other health care providers outside of the [address] [place] [address] since your last visit? Include any pap smears or colon screening.  No

## 2018-08-27 NOTE — MR AVS SNAPSHOT
2700 [facility] 102 1400 [address] [place] [address] 
[phone] Patient: Khadar Saunders MRN: [id number] TXI:[id number] Visit Information Date & Time Provider Department Dept. Phone Encounter #  
 8/27/2018  9:30 AM Dawn Wray MD [facility] Internal Medicine [phone] [id number] Upcoming Health Maintenance Date Due Hepatitis C Screening 1955 Pneumococcal 19-64 Medium Risk (1 of 1 - PPSV23) 11/18/1974 FOBT Q 1 YEAR AGE 50-75 11/18/2005 ZOSTER VACCINE AGE 60> 9/18/2015 PAP AKA CERVICAL CYTOLOGY 7/2/2017 Influenza Age 5 to Adult 10/17/2018* BREAST CANCER SCRN MAMMOGRAM 6/23/2019 DTaP/Tdap/Td series (2 - Td) 8/10/2026 *Topic was postponed. The date shown is not the original due date. Allergies as of 8/27/2018  Review Complete On: 8/27/2018 By: Dawn Wray MD  
 No Known Allergies Current Immunizations  Reviewed on 8/27/2018 Name Date Tdap 8/10/2016 Reviewed by Dawn Wray MD on 8/27/2018 at 10:05 AM  
You Were Diagnosed With   
  
 Codes Comments Essential hypertension    -  Primary ICD-10-CM: I10 
ICD-9-CM: 401.9 Obesity (BMI 30.0-34.9)     ICD-10-CM: S69.0 ICD-9-CM: 278.00 Mixed hyperlipidemia     ICD-10-CM: E78.2 ICD-9-CM: 272.2 Constipation, unspecified constipation type     ICD-10-CM: K59.00 ICD-9-CM: 564.00 Allergic rhinitis due to pollen, unspecified seasonality     ICD-10-CM: J30.1 ICD-9-CM: 477.0 Wax in ear     ICD-10-CM: H61.20 ICD-9-CM: 380.4 Vitals BP Pulse Temp Resp Height(growth percentile) Weight(growth percentile) 150/90 61 98.1 °F (36.7 °C) (Oral) 20 5' 4\" (1.626 m) 187 lb (84.8 kg) SpO2 BMI OB Status Smoking Status 99% 32.1 kg/m2 Postmenopausal Current Every Day Smoker Vitals History BMI and BSA Data Body Mass Index Body Surface Area  
 32.1 kg/m 2 1.96 m 2 Preferred Pharmacy Pharmacy Name Phone [facility] [address] [place] [address] [phone] Your Updated Medication List  
  
   
This list is accurate as of 8/27/18 10:05 AM.  Always use your most recent med list.  
  
  
  
  
 bisoprolol-hydroCHLOROthiazide 5-6.25 mg per tablet Commonly known as:  [facility] Take 1 Tab by mouth daily. calcium carbonate 200 mg calcium (500 mg) Chew Commonly known as:  TUMS Take 1 Tab by mouth daily. ergocalciferol 50,000 unit capsule Commonly known as:  ERGOCALCIFEROL Take 1 Cap by mouth every seven (7) days. simvastatin 20 mg tablet Commonly known as:  ZOCOR  
TAKE ONE TABLET BY MOUTH NIGHTLY  
  
 VALTREX 500 mg tablet Generic drug:  valACYclovir Take  by mouth two (2) times a day. Patient Instructions DASH Diet: Care Instructions Your Care Instructions The DASH diet is an eating plan that can help lower your blood pressure. DASH stands for Dietary Approaches to Stop Hypertension. Hypertension is high blood pressure. The DASH diet focuses on eating foods that are high in calcium, potassium, and magnesium. These nutrients can lower blood pressure. The foods that are highest in these nutrients are fruits, vegetables, low-fat dairy products, nuts, seeds, and legumes. But taking calcium, potassium, and magnesium supplements instead of eating foods that are high in those nutrients does not have the same effect. The DASH diet also includes whole grains, fish, and poultry. The DASH diet is one of several lifestyle changes your doctor may recommend to lower your high blood pressure. Your doctor may also want you to decrease the amount of sodium in your diet. Lowering sodium while following the DASH diet can lower blood pressure even further than just the DASH diet alone. Follow-up care is a key part of your treatment and safety.  Be sure to make and go to all appointments, and call your doctor if you are having problems. It's also a good idea to know your test results and keep a list of the medicines you take. How can you care for yourself at home? Following the DASH diet · Eat 4 to 5 servings of fruit each day. A serving is 1 medium-sized piece of fruit, ½ cup chopped or canned fruit, 1/4 cup dried fruit, or 4 ounces (½ cup) of fruit juice. Choose fruit more often than fruit juice. · Eat 4 to 5 servings of vegetables each day. A serving is 1 cup of lettuce or raw leafy vegetables, ½ cup of chopped or cooked vegetables, or 4 ounces (½ cup) of vegetable juice. Choose vegetables more often than vegetable juice. · Get 2 to 3 servings of low-fat and fat-free dairy each day. A serving is 8 ounces of milk, 1 cup of yogurt, or 1 ½ ounces of cheese. · Eat 6 to 8 servings of grains each day. A serving is 1 slice of bread, 1 ounce of dry cereal, or ½ cup of cooked rice, pasta, or cooked cereal. Try to choose whole-grain products as much as possible. · Limit lean meat, poultry, and fish to 2 servings each day. A serving is 3 ounces, about the size of a deck of cards. · Eat 4 to 5 servings of nuts, seeds, and legumes (cooked dried beans, lentils, and split peas) each week. A serving is 1/3 cup of nuts, 2 tablespoons of seeds, or ½ cup of cooked beans or peas. · Limit fats and oils to 2 to 3 servings each day. A serving is 1 teaspoon of vegetable oil or 2 tablespoons of salad dressing. · Limit sweets and added sugars to 5 servings or less a week. A serving is 1 tablespoon jelly or jam, ½ cup sorbet, or 1 cup of lemonade. · Eat less than 2,300 milligrams (mg) of sodium a day. If you limit your sodium to 1,500 mg a day, you can lower your blood pressure even more. Tips for success · Start small. Do not try to make dramatic changes to your diet all at once. You might feel that you are missing out on your favorite foods and then be more likely to not follow the plan.  Make small changes, and stick with them. Once those changes become habit, add a few more changes. · Try some of the following: ¨ Make it a goal to eat a fruit or vegetable at every meal and at snacks. This will make it easy to get the recommended amount of fruits and vegetables each day. ¨ Try yogurt topped with fruit and nuts for a snack or healthy dessert. ¨ Add lettuce, tomato, cucumber, and onion to sandwiches. ¨ Combine a ready-made pizza crust with low-fat mozzarella cheese and lots of vegetable toppings. Try using tomatoes, squash, spinach, broccoli, carrots, cauliflower, and onions. ¨ Have a variety of cut-up vegetables with a low-fat dip as an appetizer instead of chips and dip. ¨ Sprinkle sunflower seeds or chopped almonds over salads. Or try adding chopped walnuts or almonds to cooked vegetables. ¨ Try some vegetarian meals using beans and peas. Add garbanzo or kidney beans to salads. Make burritos and tacos with mashed snow beans or black beans. Where can you learn more? Go to http://chipCOGEONchetan.info/. Enter E543 in the search box to learn more about \"DASH Diet: Care Instructions. \" Current as of: December 6, 2017 Content Version: 11.7 © 3659-3936 "[employer] Care instructions adapted under license by [employer] (which disclaims liability or warranty for this information). If you have questions about a medical condition or this instruction, always ask your healthcare professional. [place] [address] any warranty or liability for your use of this information. High-Fiber Diet: Care Instructions Your Care Instructions A high-fiber diet may help you relieve constipation and feel less bloated. Your doctor and dietitian will help you make a high-fiber eating plan based on your personal needs. The plan will include the things you like to eat. It will also make sure that you get 30 grams of fiber a day. Before you make changes to the way you eat, be sure to talk with your doctor or dietitian. Follow-up care is a key part of your treatment and safety. Be sure to make and go to all appointments, and call your doctor if you are having problems. It's also a good idea to know your test results and keep a list of the medicines you take. How can you care for yourself at home? · You can increase how much fiber you get if you eat more of certain foods. These foods include: ¨ Whole-grain breads and cereals. ¨ Fruits, such as pears, apples, and peaches. Eat the skins, peels, and seeds, if you can. ¨ Vegetables, such as broccoli, cabbage, spinach, carrots, asparagus, and squash. ¨ Starchy vegetables. These include potatoes with skins, kidney beans, and lima beans. · Take a fiber supplement every day if your doctor recommends it. Examples are Benefiber, Citrucel, FiberCon, and Metamucil. Ask your doctor how much to take. · Drink plenty of fluids, enough so that your urine is light yellow or clear like water. If you have kidney, heart, or liver disease and have to limit fluids, talk with your doctor before you increase the amount of fluids you drink. · Get some exercise every day. Exercise helps stool move through the colon. It also helps prevent constipation. · Keep a food diary. Try to notice and write down what foods cause gas, pain, or other symptoms. Then you can avoid these foods. Where can you learn more? Go to http://chip-chetan.info/. Enter M826 in the search box to learn more about \"High-Fiber Diet: Care Instructions. \" Current as of: May 12, 2017 Content Version: 11.7 © 4951-6663 [employer]. Care instructions adapted under license by [employer] (which disclaims liability or warranty for this information).  If you have questions about a medical condition or this instruction, always ask your healthcare professional. [employer] disclaims any warranty or liability for your use of this information. Please provide this summary of care documentation to your next provider. Your primary care clinician is listed as Elayne Soulier. If you have any questions after today's visit, please call [phone].

## 2018-08-27 NOTE — PROGRESS NOTES
HISTORY OF PRESENT ILLNESS  Chente Ely is a 58 y.o. female here for follow up. Reports nasal congestion and postnasal drip, she is taking OTC Zyrtec. Has hypertension blood pressure is okay, she did not increase her blood pressure medications. No chest pain or palpitation. Still smoking,but less. no cough. Has elevated lipid. on statin. no myalgia. Report constipation for several months. No abdominal pain or nausea or vomiting.  colonoscopy was normal and up to date. Sinus Pain    Associated symptoms include congestion and cough. Nasal Discharge     Constipation    Associated symptoms include constipation. Hypertension      Cholesterol Problem         Review of Systems   Constitutional: Negative. HENT: Positive for congestion and sinus pain. Eyes: Negative. Respiratory: Positive for cough. Cardiovascular: Negative. Gastrointestinal: Positive for constipation. Genitourinary: Negative. Musculoskeletal: Negative. Negative for falls. Skin: Negative. Neurological: Negative. Psychiatric/Behavioral: Negative. Negative for depression and suicidal ideas. Physical Exam   Constitutional: She appears well-developed and well-nourished. No distress. HENT:   Head: Normocephalic and atraumatic. Right Ear: External ear normal.   Nose: Nasal discharge present. Mouth/Throat: Oropharynx is clear and moist. No oropharyngeal exudate. waX on left ear canal.Nasal turbinates:red inflamed,NT    Cobble stoning present. Neck: Normal range of motion. Neck supple. No JVD present. No thyromegaly present. Cardiovascular: Normal rate, regular rhythm, normal heart sounds and intact distal pulses. Pulmonary/Chest: Effort normal and breath sounds normal. No respiratory distress. She has no wheezes. Abdominal: Soft. Bowel sounds are normal. She exhibits no distension. There is no tenderness. Musculoskeletal: Normal range of motion. Psychiatric: She has a normal mood and affect. Her behavior is normal.       ASSESSMENT and PLAN  Diagnoses and all orders for this visit:    1. Essential hypertension    Blood pressure is almost normal.  She did increase her dosage of Ziac. Advised her to continue same dosage and be on DASH diet. Labs stable. 2. Obesity (BMI 30.0-34. 9)    Addressed weight, diet and exercise with patient. Decrease carbohydrates (white foods, sweet foods, sweet drinks and alcohol), increase green leafy vegetables and protein (lean meats and beans) with each meal. Avoid fried foods. Eat 3-5 small meals daily. Do not skip meals. Increase water intake. Increase physical activity to 30 minutes daily for health benefit or 60 minutes daily to prevent weight regain, as tolerated. Get 7-8 hours uninterrupted sleep nightly. 3. Mixed hyperlipidemia    Stable. On statin. 4. Constipation, unspecified constipation type      High-fiber diet. Advised to take Metamucil 1 tablespoon in 1 glass of water every day. 5. Allergic rhinitis due to pollen, unspecified seasonality  Taking Zyrtec as needed. Advised to take locally grown honey. 6. Wax in ear  Advised to use peroxide 1 drop 3 times a day for 1 week. Report nasal congestion and postnasal drip. She is using Zyrtec over-the-counter. Report nasal congestion and postnasal drip. She is taking over-the-counter Zyrtec. Discussed expected course/resolution/complications of diagnosis in detail with patient. Medication risks/benefits/costs/interactions/alternatives discussed with patient. Pt was given an after visit summary which includes diagnoses, current medications & vitals. Pt expressed understanding with the diagnosis and plan.

## 2018-09-18 RX ORDER — BISOPROLOL FUMARATE AND HYDROCHLOROTHIAZIDE 2.5; 6.25 MG/1; MG/1
TABLET ORAL
Qty: 90 TAB | Refills: 1 | Status: SHIPPED | OUTPATIENT
Start: 2018-09-18 | End: 2019-05-29 | Stop reason: ALTCHOICE

## 2019-04-16 DIAGNOSIS — E78.2 MIXED HYPERLIPIDEMIA: ICD-10-CM

## 2019-04-16 RX ORDER — SIMVASTATIN 20 MG/1
TABLET, FILM COATED ORAL
Qty: 90 TAB | Refills: 1 | Status: SHIPPED | OUTPATIENT
Start: 2019-04-16 | End: 2019-10-15 | Stop reason: SDUPTHER

## 2019-05-29 ENCOUNTER — OFFICE VISIT (OUTPATIENT)
Dept: INTERNAL MEDICINE CLINIC | Age: 64
End: 2019-05-29

## 2019-05-29 VITALS
OXYGEN SATURATION: 98 % | WEIGHT: 194.2 LBS | HEART RATE: 78 BPM | TEMPERATURE: 98.4 F | SYSTOLIC BLOOD PRESSURE: 132 MMHG | RESPIRATION RATE: 16 BRPM | BODY MASS INDEX: 33.16 KG/M2 | DIASTOLIC BLOOD PRESSURE: 80 MMHG | HEIGHT: 64 IN

## 2019-05-29 DIAGNOSIS — E55.9 VITAMIN D DEFICIENCY: ICD-10-CM

## 2019-05-29 DIAGNOSIS — Z12.39 SCREENING FOR BREAST CANCER: ICD-10-CM

## 2019-05-29 DIAGNOSIS — Z00.00 ROUTINE GENERAL MEDICAL EXAMINATION AT A HEALTH CARE FACILITY: Primary | ICD-10-CM

## 2019-05-29 DIAGNOSIS — Z78.0 MENOPAUSE: ICD-10-CM

## 2019-05-29 DIAGNOSIS — Z23 ENCOUNTER FOR IMMUNIZATION: ICD-10-CM

## 2019-05-29 NOTE — PROGRESS NOTES
HISTORY OF PRESENT ILLNESS  Agustina Scherer is a 61 y.o. female here for follow up. Here for complete physical.   Has hypertension blood pressure is okay, she did not increase her blood pressure medications. No chest pain or palpitation. Has elevated lipid. on statin. no myalgia. Need mammogram bone density. Need pneumonia shot. Need to get Pap smear again. Sexually not active. Constipation      Hypertension      Cholesterol Problem     Medication Refill         Review of Systems   Constitutional: Negative. Eyes: Negative. Respiratory: Negative. Cardiovascular: Negative. Gastrointestinal: Negative. Genitourinary: Negative. Musculoskeletal: Negative. Negative for falls. Skin: Negative. Neurological: Negative. Psychiatric/Behavioral: Negative. Negative for depression and suicidal ideas. Physical Exam   Constitutional: She is oriented to person, place, and time. She appears well-developed and well-nourished. No distress. HENT:   Head: Normocephalic and atraumatic. Right Ear: External ear normal.   Left Ear: External ear normal.   Nose: Nasal discharge present. Mouth/Throat: Oropharynx is clear and moist. No oropharyngeal exudate. Neck: Normal range of motion. Neck supple. No JVD present. No thyromegaly present. Cardiovascular: Normal rate, regular rhythm, normal heart sounds and intact distal pulses. Pulmonary/Chest: Effort normal and breath sounds normal. No respiratory distress. She has no wheezes. Abdominal: Soft. Bowel sounds are normal. She exhibits no distension. There is no tenderness. Musculoskeletal: Normal range of motion. Neurological: She is alert and oriented to person, place, and time. She has normal reflexes. She displays normal reflexes. No cranial nerve deficit. She exhibits normal muscle tone. Coordination normal.   Psychiatric: She has a normal mood and affect.  Her behavior is normal.       ASSESSMENT and PLAN  Diagnoses and all orders for this visit:    1. Routine general medical examination at a health care facility    Seems healthy. Advised to eat healthy and exercise. Will check,  -     CBC WITH AUTOMATED DIFF  -     METABOLIC PANEL, COMPREHENSIVE  -     LIPID PANEL  -     TSH 3RD GENERATION  -     URINALYSIS W/ RFLX MICROSCOPIC     2. Vitamin D deficiency    Will check,  -     VITAMIN D, 25 HYDROXY    3. Menopause    will order  -     DEXA BONE DENSITY STUDY AXIAL; Future    4. Screening for breast cancer  -     [facility] MAMMO BI SCREENING INCL CAD; Future    5. Encounter for immunization    Will give,  -     PNEUMOCOCCAL CONJ VACCINE 13 VALENT IM    6. Hypertension    Stable blood pressure, on Ziac. 7.  Hyperlipidemia  Stable, on statin. Addressed weight, diet and exercise with patient. Decrease carbohydrates (white foods, sweet foods, sweet drinks and alcohol), increase green leafy vegetables and protein (lean meats and beans) with each meal. Avoid fried foods. Eat 3-5 small meals daily. Do not skip meals. Increase water intake. Increase physical activity to 30 minutes daily for health benefit or 60 minutes daily to prevent weight regain, as tolerated. Get 7-8 hours uninterrupted sleep nightly. Discussed expected course/resolution/complications of diagnosis in detail with patient. Medication risks/benefits/costs/interactions/alternatives discussed with patient. Pt was given an after visit summary which includes diagnoses, current medications & vitals. Pt expressed understanding with the diagnosis and plan.

## 2019-05-29 NOTE — PROGRESS NOTES
Douglas Stokes is a 61 y.o. female  who presents for routine immunization(s) Prevnar-13. Patient denies any symptoms , reactions or allergies that would exclude them from being immunized today. Risks and adverse reactions were discussed and the VIS was given to them. Patient voiced full understanding and signed Adult Immunization Consent form. All questions were addressed. Patient was observed for 10 min post injection. There were no reactions observed.     Katerina Weston LPN

## 2019-05-29 NOTE — PROGRESS NOTES
Dari Tony is a 61 y.o. female    Chief Complaint   Patient presents with    Medication Refill    Hypertension    Cholesterol Problem     1. Have you been to the ER, urgent care clinic since your last visit? Hospitalized since your last visit? No     2. Have you seen or consulted any other health care providers outside of the [address] [place] [address] since your last visit? Include any pap smears or colon screening.   No     Visit Vitals  /80 (BP 1 Location: Left arm, BP Patient Position: Sitting)   Pulse 78   Temp 98.4 °F (36.9 °C) (Oral)   Resp 16   Ht 5' 4\" (1.626 m)   Wt 194 lb 3.2 oz (88.1 kg)   SpO2 98%   BMI 33.33 kg/m²

## 2019-05-29 NOTE — PATIENT INSTRUCTIONS
Well Visit, Women 48 to 72: Care Instructions  Your Care Instructions    Physical exams can help you stay healthy. Your doctor has checked your overall health and may have suggested ways to take good care of yourself. He or she also may have recommended tests. At home, you can help prevent illness with healthy eating, regular exercise, and other steps. Follow-up care is a key part of your treatment and safety. Be sure to make and go to all appointments, and call your doctor if you are having problems. It's also a good idea to know your test results and keep a list of the medicines you take. How can you care for yourself at home? · Reach and stay at a healthy weight. This will lower your risk for many problems, such as obesity, diabetes, heart disease, and high blood pressure. · Get at least 30 minutes of exercise on most days of the week. Walking is a good choice. You also may want to do other activities, such as running, swimming, cycling, or playing tennis or team sports. · Do not smoke. Smoking can make health problems worse. If you need help quitting, talk to your doctor about stop-smoking programs and medicines. These can increase your chances of quitting for good. · Protect your skin from too much sun. When you're outdoors from 10 a.m. to 4 p.m., stay in the shade or cover up with clothing and a hat with a wide brim. Wear sunglasses that block UV rays. Even when it's cloudy, put broad-spectrum sunscreen (SPF 30 or higher) on any exposed skin. · See a dentist one or two times a year for checkups and to have your teeth cleaned. · Wear a seat belt in the car. · Limit alcohol to 1 drink a day. Too much alcohol can cause health problems. Follow your doctor's advice about when to have certain tests. These tests can spot problems early. · Cholesterol.  Your doctor will tell you how often to have this done based on your age, family history, or other things that can increase your risk for heart attack and stroke. · Blood pressure. Have your blood pressure checked during a routine doctor visit. Your doctor will tell you how often to check your blood pressure based on your age, your blood pressure results, and other factors. · Mammogram. Ask your doctor how often you should have a mammogram, which is an X-ray of your breasts. A mammogram can spot breast cancer before it can be felt and when it is easiest to treat. · Pap test and pelvic exam. Ask your doctor how often you should have a Pap test. You may not need to have a Pap test as often as you used to. · Vision. Have your eyes checked every year or two or as often as your doctor suggests. Some experts recommend that you have yearly exams for glaucoma and other age-related eye problems starting at age 48. · Hearing. Tell your doctor if you notice any change in your hearing. You can have tests to find out how well you hear. · Diabetes. Ask your doctor whether you should have tests for diabetes. · Colon cancer. You should begin tests for colon cancer at age 48. You may have one of several tests. Your doctor will tell you how often to have tests based on your age and risk. Risks include whether you already had a precancerous polyp removed from your colon or whether your parents, sisters and brothers, or children have had colon cancer. · Thyroid disease. Talk to your doctor about whether to have your thyroid checked as part of a regular physical exam. Women have an increased chance of a thyroid problem. · Osteoporosis. You should begin tests for bone density at age 72. If you are younger than 72, ask your doctor whether you have factors that may increase your risk for this disease. You may want to have this test before age 72. · Heart attack and stroke risk. At least every 4 to 6 years, you should have your risk for heart attack and stroke assessed.  Your doctor uses factors such as your age, blood pressure, cholesterol, and whether you smoke or have diabetes to show what your risk for a heart attack or stroke is over the next 10 years. When should you call for help? Watch closely for changes in your health, and be sure to contact your doctor if you have any problems or symptoms that concern you. Where can you learn more? Go to http://chip-chetan.info/. Enter V613 in the search box to learn more about \"Well Visit, Women 50 to 72: Care Instructions. \"  Current as of: March 28, 2018  Content Version: 11.9  © 4899-3204 [employer]. Care instructions adapted under license by [employer] (which disclaims liability or warranty for this information). If you have questions about a medical condition or this instruction, always ask your healthcare professional. [address] any warranty or liability for your use of this information.

## 2019-05-30 LAB
25(OH)D3+25(OH)D2 SERPL-MCNC: 22 NG/ML (ref 30–100)
ALBUMIN SERPL-MCNC: 4.3 G/DL (ref 3.6–4.8)
ALBUMIN/GLOB SERPL: 1.7 {RATIO} (ref 1.2–2.2)
ALP SERPL-CCNC: 92 IU/L (ref 39–117)
ALT SERPL-CCNC: 18 IU/L (ref 0–32)
APPEARANCE UR: CLEAR
AST SERPL-CCNC: 14 IU/L (ref 0–40)
BACTERIA #/AREA URNS HPF: NORMAL /[HPF]
BASOPHILS # BLD AUTO: 0 X10E3/UL (ref 0–0.2)
BASOPHILS NFR BLD AUTO: 0 %
BILIRUB SERPL-MCNC: 0.3 MG/DL (ref 0–1.2)
BILIRUB UR QL STRIP: NEGATIVE
BUN SERPL-MCNC: 11 MG/DL (ref 8–27)
BUN/CREAT SERPL: 14 (ref 12–28)
CALCIUM SERPL-MCNC: 9.4 MG/DL (ref 8.7–10.3)
CASTS URNS QL MICRO: NORMAL /LPF
CHLORIDE SERPL-SCNC: 106 MMOL/L (ref 96–106)
CHOLEST SERPL-MCNC: 188 MG/DL (ref 100–199)
CO2 SERPL-SCNC: 24 MMOL/L (ref 20–29)
COLOR UR: YELLOW
CREAT SERPL-MCNC: 0.76 MG/DL (ref 0.57–1)
EOSINOPHIL # BLD AUTO: 0.2 X10E3/UL (ref 0–0.4)
EOSINOPHIL NFR BLD AUTO: 4 %
EPI CELLS #/AREA URNS HPF: NORMAL /HPF (ref 0–10)
ERYTHROCYTE [DISTWIDTH] IN BLOOD BY AUTOMATED COUNT: 13.1 % (ref 12.3–15.4)
GLOBULIN SER CALC-MCNC: 2.6 G/DL (ref 1.5–4.5)
GLUCOSE SERPL-MCNC: 110 MG/DL (ref 65–99)
GLUCOSE UR QL: NEGATIVE
HCT VFR BLD AUTO: 38.5 % (ref 34–46.6)
HDLC SERPL-MCNC: 62 MG/DL
HGB BLD-MCNC: 12.5 G/DL (ref 11.1–15.9)
HGB UR QL STRIP: NEGATIVE
IMM GRANULOCYTES # BLD AUTO: 0 X10E3/UL (ref 0–0.1)
IMM GRANULOCYTES NFR BLD AUTO: 0 %
INTERPRETATION, 910389: NORMAL
KETONES UR QL STRIP: NEGATIVE
LDLC SERPL CALC-MCNC: 116 MG/DL (ref 0–99)
LEUKOCYTE ESTERASE UR QL STRIP: ABNORMAL
LYMPHOCYTES # BLD AUTO: 1.3 X10E3/UL (ref 0.7–3.1)
LYMPHOCYTES NFR BLD AUTO: 31 %
MCH RBC QN AUTO: 30.5 PG (ref 26.6–33)
MCHC RBC AUTO-ENTMCNC: 32.5 G/DL (ref 31.5–35.7)
MCV RBC AUTO: 94 FL (ref 79–97)
MICRO URNS: ABNORMAL
MONOCYTES # BLD AUTO: 0.3 X10E3/UL (ref 0.1–0.9)
MONOCYTES NFR BLD AUTO: 8 %
MUCOUS THREADS URNS QL MICRO: PRESENT
NEUTROPHILS # BLD AUTO: 2.3 X10E3/UL (ref 1.4–7)
NEUTROPHILS NFR BLD AUTO: 57 %
NITRITE UR QL STRIP: NEGATIVE
PH UR STRIP: 5.5 [PH] (ref 5–7.5)
PLATELET # BLD AUTO: 291 X10E3/UL (ref 150–450)
POTASSIUM SERPL-SCNC: 4.2 MMOL/L (ref 3.5–5.2)
PROT SERPL-MCNC: 6.9 G/DL (ref 6–8.5)
PROT UR QL STRIP: NEGATIVE
RBC # BLD AUTO: 4.1 X10E6/UL (ref 3.77–5.28)
RBC #/AREA URNS HPF: NORMAL /HPF (ref 0–2)
SODIUM SERPL-SCNC: 144 MMOL/L (ref 134–144)
SP GR UR: 1.02 (ref 1–1.03)
TRIGL SERPL-MCNC: 49 MG/DL (ref 0–149)
TSH SERPL DL<=0.005 MIU/L-ACNC: 1.72 UIU/ML (ref 0.45–4.5)
UROBILINOGEN UR STRIP-MCNC: 1 MG/DL (ref 0.2–1)
VLDLC SERPL CALC-MCNC: 10 MG/DL (ref 5–40)
WBC # BLD AUTO: 4.1 X10E3/UL (ref 3.4–10.8)
WBC #/AREA URNS HPF: NORMAL /HPF (ref 0–5)

## 2019-06-03 NOTE — PROGRESS NOTES
LDL slightly elevated. Need to be on low-cholesterol diet and exercise. Vitamin D slightly low. Advised to take vitamin D3 2000 units once a day for 4 months. All other labs are stable.

## 2019-07-19 RX ORDER — BISOPROLOL FUMARATE AND HYDROCHLOROTHIAZIDE 5; 6.25 MG/1; MG/1
TABLET ORAL
Qty: 90 TAB | Refills: 1 | Status: SHIPPED | OUTPATIENT
Start: 2019-07-19 | End: 2020-01-08 | Stop reason: SDUPTHER

## 2019-09-25 ENCOUNTER — TELEPHONE (OUTPATIENT)
Dept: INTERNAL MEDICINE CLINIC | Age: 64
End: 2019-09-25

## 2019-09-25 DIAGNOSIS — Z23 ENCOUNTER FOR IMMUNIZATION: Primary | ICD-10-CM

## 2019-09-25 NOTE — TELEPHONE ENCOUNTER
Pt is requesting an order to have a shot for yellow fever at [facility] on [address]- pharmacy requires script  Fax # [phone]  She has an appointment scheduled for Friday 9/27/19

## 2019-09-25 NOTE — TELEPHONE ENCOUNTER
Spoke with patient and she states that she will be traveling to [place] in December, per verbal order from provider    Requested Prescriptions     Signed Prescriptions Disp Refills    yellow fever live, PF, (STAMARIL) 1,000 unit/0.5 mL susr vaccine 0.5 mL 0     Si.5 mL by SubCUTAneous route once for 1 dose. Authorizing Provider: Mary Granados     Ordering User: Lisa Jaquez, verbal order received.

## 2019-10-14 ENCOUNTER — TELEPHONE (OUTPATIENT)
Dept: INTERNAL MEDICINE CLINIC | Age: 64
End: 2019-10-14

## 2019-10-14 NOTE — TELEPHONE ENCOUNTER
Pt usually has lab work ordered at her f/u and wants to know if she needs to fast for her appointment? She wants to make an appointment as soon as possible for her refills and is requesting a call back today if possible.  Pt states you can leave info on her voice mail if needed

## 2019-10-15 DIAGNOSIS — E78.2 MIXED HYPERLIPIDEMIA: ICD-10-CM

## 2019-10-15 RX ORDER — SIMVASTATIN 20 MG/1
TABLET, FILM COATED ORAL
Qty: 90 TAB | Refills: 0 | Status: SHIPPED | OUTPATIENT
Start: 2019-10-15 | End: 2020-03-23

## 2019-10-15 NOTE — TELEPHONE ENCOUNTER
Writer placed call to patient and patient states she is almost out of her simvastatin and with her work schedule and Dr Fuentes Fermin schedule she is unable to schedule an appointment prior to patient running out of medication

## 2019-10-15 NOTE — TELEPHONE ENCOUNTER
Writer placed call to patient and patient states she is almost out of her simvastatin and with her work schedule and Dr Rk Smith schedule she is unable to schedule an appointment prior to patient running out of medication, refill request sent to provider to address, if refill is approved, writer to assist patient with scheduling an appointment for follow up.  Will await provider response

## 2019-10-15 NOTE — TELEPHONE ENCOUNTER
LM for pt to call office and schedule a follow up with Dr Jess Noguera beginning to mid Jan 2020 and that 90 refill for simvastatin had been sent to her pharmacy

## 2019-10-17 DIAGNOSIS — E78.2 MIXED HYPERLIPIDEMIA: ICD-10-CM

## 2019-10-17 RX ORDER — SIMVASTATIN 20 MG/1
TABLET, FILM COATED ORAL
Qty: 90 TAB | Refills: 1 | Status: SHIPPED | OUTPATIENT
Start: 2019-10-17 | End: 2020-01-08 | Stop reason: SDUPTHER

## 2019-12-16 RX ORDER — ATOVAQUONE AND PROGUANIL HYDROCHLORIDE 250; 100 MG/1; MG/1
TABLET, FILM COATED ORAL
Qty: 2 TAB | Refills: 0 | Status: SHIPPED | OUTPATIENT
Start: 2019-12-16 | End: 2020-06-24 | Stop reason: ALTCHOICE

## 2019-12-16 NOTE — TELEPHONE ENCOUNTER
Per verbal order by Dr. Ty Christianson, read back for confirmation, called the pt and discussed the dates of her trip. Per the pt she is traveling from 12/26/19-1/4/20. Advised that per Dr. Ty Christianson the pt may take Malarone 250/100mg 1 tablet 2 days prior to her trip (12/24) and 1 tablet 1 week later (12/31). The pt voiced full understanding and a script was sent to her pharmacy.

## 2019-12-20 ENCOUNTER — TELEPHONE (OUTPATIENT)
Dept: INTERNAL MEDICINE CLINIC | Age: 64
End: 2019-12-20

## 2019-12-20 NOTE — TELEPHONE ENCOUNTER
----- Message from Laureen Lora sent at 12/20/2019 10:40 AM EST -----  Regarding: Dr. phelps/ telephone  Contact: [phone]  Amara Links from [address] is requesting a call back in regards to \"Malarone\" 250mg - has questions.  Best contact [phone]

## 2019-12-23 NOTE — TELEPHONE ENCOUNTER
Needs ton speak with someone regarding the Malarone .  The directions is wrong for these 2 pills she needs about many more pills please call ASAP she needs to start tomorrow

## 2020-01-08 ENCOUNTER — OFFICE VISIT (OUTPATIENT)
Dept: INTERNAL MEDICINE CLINIC | Age: 65
End: 2020-01-08

## 2020-01-08 VITALS
WEIGHT: 193.5 LBS | HEIGHT: 64 IN | TEMPERATURE: 98.5 F | BODY MASS INDEX: 33.03 KG/M2 | OXYGEN SATURATION: 99 % | HEART RATE: 54 BPM | SYSTOLIC BLOOD PRESSURE: 126 MMHG | RESPIRATION RATE: 15 BRPM | DIASTOLIC BLOOD PRESSURE: 78 MMHG

## 2020-01-08 DIAGNOSIS — E66.9 OBESITY (BMI 30.0-34.9): ICD-10-CM

## 2020-01-08 DIAGNOSIS — I10 ESSENTIAL HYPERTENSION: Primary | ICD-10-CM

## 2020-01-08 DIAGNOSIS — A09 DIARRHEA, TRAVELERS': ICD-10-CM

## 2020-01-08 DIAGNOSIS — E55.9 VITAMIN D DEFICIENCY: ICD-10-CM

## 2020-01-08 RX ORDER — CIPROFLOXACIN 500 MG/1
500 TABLET ORAL 2 TIMES DAILY
Qty: 14 TAB | Refills: 0 | Status: SHIPPED | OUTPATIENT
Start: 2020-01-08 | End: 2020-06-24 | Stop reason: ALTCHOICE

## 2020-01-08 NOTE — PROGRESS NOTES
HISTORY OF PRESENT ILLNESS  Jeannette Rivers is a 59 y.o. female here for follow up. She came back from [place]. Noticed to have watery diarrhea for last several days. No abdominal pain, no blood in stool. No fever. Has hypertension blood pressure is okay, she did not increase her blood pressure medications. No chest pain or palpitation. Has elevated lipid. on statin. no myalgia. Need lab work. Just finishing up Malarone. Medication Refill     Hypertension      Cholesterol Problem     Diarrhea          Review of Systems   Constitutional: Negative. Eyes: Negative. Respiratory: Negative. Cardiovascular: Negative. Gastrointestinal: Positive for diarrhea. Genitourinary: Negative. Musculoskeletal: Negative. Negative for falls. Skin: Negative. Neurological: Negative. Psychiatric/Behavioral: Negative. Negative for depression and suicidal ideas. Physical Exam  Constitutional:       General: She is not in acute distress. Appearance: Normal appearance. She is well-developed. HENT:      Mouth/Throat:      Pharynx: No oropharyngeal exudate. Neck:      Musculoskeletal: Normal range of motion and neck supple. Thyroid: No thyromegaly. Vascular: No JVD. Cardiovascular:      Rate and Rhythm: Normal rate and regular rhythm. Heart sounds: Normal heart sounds. Pulmonary:      Effort: Pulmonary effort is normal. No respiratory distress. Breath sounds: Normal breath sounds. No wheezing. Abdominal:      General: Abdomen is flat. Bowel sounds are normal. There is no distension. Palpations: Abdomen is soft. Tenderness: There is no tenderness. Comments: Bowel sounds slightly hyperactive. Musculoskeletal: Normal range of motion. Neurological:      Mental Status: She is alert. Motor: No abnormal muscle tone.    Psychiatric:         Mood and Affect: Mood normal.         Behavior: Behavior normal.         ASSESSMENT and PLAN  Diagnoses and all orders for this visit:    1. Essential hypertension    Stable blood pressure. On Ziac. Will check,  -     CBC WITH AUTOMATED DIFF  -     METABOLIC PANEL, COMPREHENSIVE    2. Obesity (BMI 30.0-34. 9)    Addressed weight, diet and exercise with patient. Decrease carbohydrates (white foods, sweet foods, sweet drinks and alcohol), increase green leafy vegetables and protein (lean meats and beans) with each meal. Avoid fried foods. Eat 3-5 small meals daily. Do not skip meals. Increase water intake. Increase physical activity to 30 minutes daily for health benefit or 60 minutes daily to prevent weight regain, as tolerated. Get 7-8 hours uninterrupted sleep nightly. -     METABOLIC PANEL, COMPREHENSIVE    3. Diarrhea, travelers    Need to drink more fluid. Will give,  -     ciprofloxacin HCl (CIPRO) 500 mg tablet; Take 1 Tab by mouth two (2) times a day. 4. Vitamin D deficiency    Will check,  -     VITAMIN D, 25 HYDROXY        Discussed expected course/resolution/complications of diagnosis in detail with patient. Medication risks/benefits/costs/interactions/alternatives discussed with patient. Pt was given an after visit summary which includes diagnoses, current medications & vitals. Pt expressed understanding with the diagnosis and plan.

## 2020-01-08 NOTE — PROGRESS NOTES
Health Maintenance Due   Topic Date Due    Hepatitis C Screening  1955    Shingrix Vaccine Age 50> (1 of 2) 11/18/2005    FOBT Q 1 YEAR AGE 50-75  11/18/2005    PAP AKA CERVICAL CYTOLOGY  07/02/2017    BREAST CANCER SCRN MAMMOGRAM  06/23/2019    Pneumococcal 0-64 years (1 of 1 - PPSV23) 07/24/2019    Influenza Age 9 to Adult  08/01/2019       No chief complaint on file. 1. Have you been to the ER, urgent care clinic since your last visit? Hospitalized since your last visit? No    2. Have you seen or consulted any other health care providers outside of the [address] [place] [address] since your last visit? Include any pap smears or colon screening. No    3) Do you have an Advance Directive on file? no    4) Are you interested in receiving information on Advance Directives? NO      Patient is accompanied by self I have received verbal consent from Rudy Bolivar to discuss any/all medical information while they are present in the room.

## 2020-01-09 LAB
25(OH)D3+25(OH)D2 SERPL-MCNC: 24.4 NG/ML (ref 30–100)
ALBUMIN SERPL-MCNC: 4.3 G/DL (ref 3.6–4.8)
ALBUMIN/GLOB SERPL: 2 {RATIO} (ref 1.2–2.2)
ALP SERPL-CCNC: 103 IU/L (ref 39–117)
ALT SERPL-CCNC: 19 IU/L (ref 0–32)
AST SERPL-CCNC: 17 IU/L (ref 0–40)
BASOPHILS # BLD AUTO: 0 X10E3/UL (ref 0–0.2)
BASOPHILS NFR BLD AUTO: 0 %
BILIRUB SERPL-MCNC: 0.4 MG/DL (ref 0–1.2)
BUN SERPL-MCNC: 10 MG/DL (ref 8–27)
BUN/CREAT SERPL: 14 (ref 12–28)
CALCIUM SERPL-MCNC: 9.5 MG/DL (ref 8.7–10.3)
CHLORIDE SERPL-SCNC: 103 MMOL/L (ref 96–106)
CO2 SERPL-SCNC: 25 MMOL/L (ref 20–29)
CREAT SERPL-MCNC: 0.72 MG/DL (ref 0.57–1)
EOSINOPHIL # BLD AUTO: 0.1 X10E3/UL (ref 0–0.4)
EOSINOPHIL NFR BLD AUTO: 3 %
ERYTHROCYTE [DISTWIDTH] IN BLOOD BY AUTOMATED COUNT: 12.1 % (ref 11.7–15.4)
GLOBULIN SER CALC-MCNC: 2.2 G/DL (ref 1.5–4.5)
GLUCOSE SERPL-MCNC: 98 MG/DL (ref 65–99)
HCT VFR BLD AUTO: 36.9 % (ref 34–46.6)
HGB BLD-MCNC: 12.2 G/DL (ref 11.1–15.9)
IMM GRANULOCYTES # BLD AUTO: 0 X10E3/UL (ref 0–0.1)
IMM GRANULOCYTES NFR BLD AUTO: 0 %
LYMPHOCYTES # BLD AUTO: 1.4 X10E3/UL (ref 0.7–3.1)
LYMPHOCYTES NFR BLD AUTO: 28 %
MCH RBC QN AUTO: 30.9 PG (ref 26.6–33)
MCHC RBC AUTO-ENTMCNC: 33.1 G/DL (ref 31.5–35.7)
MCV RBC AUTO: 93 FL (ref 79–97)
MONOCYTES # BLD AUTO: 0.4 X10E3/UL (ref 0.1–0.9)
MONOCYTES NFR BLD AUTO: 8 %
NEUTROPHILS # BLD AUTO: 3.1 X10E3/UL (ref 1.4–7)
NEUTROPHILS NFR BLD AUTO: 61 %
PLATELET # BLD AUTO: 292 X10E3/UL (ref 150–450)
POTASSIUM SERPL-SCNC: 4.5 MMOL/L (ref 3.5–5.2)
PROT SERPL-MCNC: 6.5 G/DL (ref 6–8.5)
RBC # BLD AUTO: 3.95 X10E6/UL (ref 3.77–5.28)
SODIUM SERPL-SCNC: 143 MMOL/L (ref 134–144)
WBC # BLD AUTO: 5 X10E3/UL (ref 3.4–10.8)

## 2020-01-16 RX ORDER — BISOPROLOL FUMARATE AND HYDROCHLOROTHIAZIDE 5; 6.25 MG/1; MG/1
TABLET ORAL
Qty: 90 TAB | Refills: 0 | Status: SHIPPED | OUTPATIENT
Start: 2020-01-16 | End: 2020-03-23

## 2020-01-16 NOTE — TELEPHONE ENCOUNTER
Pt called and advised that she is very upset. She wants to speak with Dr. Sasha Griggs. Advised that she has been waiting on the med for a week and that her phcy called to tell her it was ready and it is now. Please refill ASAP as she is completely out.

## 2020-03-23 DIAGNOSIS — E78.2 MIXED HYPERLIPIDEMIA: ICD-10-CM

## 2020-03-23 RX ORDER — BISOPROLOL FUMARATE AND HYDROCHLOROTHIAZIDE 5; 6.25 MG/1; MG/1
TABLET ORAL
Qty: 90 TAB | Refills: 0 | Status: SHIPPED | OUTPATIENT
Start: 2020-03-23 | End: 2020-03-25

## 2020-03-23 RX ORDER — SIMVASTATIN 20 MG/1
TABLET, FILM COATED ORAL
Qty: 90 TAB | Refills: 0 | Status: SHIPPED | OUTPATIENT
Start: 2020-03-23 | End: 2020-03-25

## 2020-03-25 DIAGNOSIS — E78.2 MIXED HYPERLIPIDEMIA: ICD-10-CM

## 2020-03-25 RX ORDER — SIMVASTATIN 20 MG/1
TABLET, FILM COATED ORAL
Qty: 90 TAB | Refills: 0 | Status: SHIPPED | OUTPATIENT
Start: 2020-03-25 | End: 2020-03-27 | Stop reason: SDUPTHER

## 2020-03-25 RX ORDER — BISOPROLOL FUMARATE AND HYDROCHLOROTHIAZIDE 5; 6.25 MG/1; MG/1
TABLET ORAL
Qty: 90 TAB | Refills: 0 | Status: SHIPPED | OUTPATIENT
Start: 2020-03-25 | End: 2020-03-30

## 2020-03-27 ENCOUNTER — TELEPHONE (OUTPATIENT)
Dept: INTERNAL MEDICINE CLINIC | Age: 65
End: 2020-03-27

## 2020-03-27 DIAGNOSIS — E78.2 MIXED HYPERLIPIDEMIA: ICD-10-CM

## 2020-03-27 RX ORDER — SIMVASTATIN 20 MG/1
TABLET, FILM COATED ORAL
Qty: 90 TAB | Refills: 0 | Status: SHIPPED | OUTPATIENT
Start: 2020-03-27 | End: 2020-06-24 | Stop reason: SDUPTHER

## 2020-03-27 NOTE — TELEPHONE ENCOUNTER
Called the pharmacy and left a VM refilling both scripts as they claim they did not receive the e-prescriptions

## 2020-03-27 NOTE — TELEPHONE ENCOUNTER
Pt called and advised that phcy said that they haven't received simvastatin or HTCZ Rx please call in

## 2020-03-30 RX ORDER — BISOPROLOL FUMARATE AND HYDROCHLOROTHIAZIDE 5; 6.25 MG/1; MG/1
TABLET ORAL
Qty: 90 TAB | Refills: 0 | Status: SHIPPED | OUTPATIENT
Start: 2020-03-30 | End: 2020-06-24 | Stop reason: SDUPTHER

## 2020-05-18 NOTE — PATIENT INSTRUCTIONS
High-Fiber Diet: Care Instructions  Your Care Instructions    A high-fiber diet may help you relieve constipation and feel less bloated. Your doctor and dietitian will help you make a high-fiber eating plan based on your personal needs. The plan will include the things you like to eat. It will also make sure that you get 30 grams of fiber a day. Before you make changes to the way you eat, be sure to talk with your doctor or dietitian. Follow-up care is a key part of your treatment and safety. Be sure to make and go to all appointments, and call your doctor if you are having problems. It's also a good idea to know your test results and keep a list of the medicines you take. How can you care for yourself at home? · You can increase how much fiber you get if you eat more of certain foods. These foods include:  ¨ Whole-grain breads and cereals. ¨ Fruits, such as pears, apples, and peaches. Eat the skins, peels, and seeds, if you can. ¨ Vegetables, such as broccoli, cabbage, spinach, carrots, asparagus, and squash. ¨ Starchy vegetables. These include potatoes with skins, kidney beans, and lima beans. · Take a fiber supplement every day if your doctor recommends it. Examples are Benefiber, Citrucel, FiberCon, and Metamucil. Ask your doctor how much to take. · Drink plenty of fluids, enough so that your urine is light yellow or clear like water. If you have kidney, heart, or liver disease and have to limit fluids, talk with your doctor before you increase the amount of fluids you drink. · Get some exercise every day. Exercise helps stool move through the colon. It also helps prevent constipation. · Keep a food diary. Try to notice and write down what foods cause gas, pain, or other symptoms. Then you can avoid these foods. Where can you learn more? Go to http://chip-chetan.info/. Enter D358 in the search box to learn more about \"High-Fiber Diet: Care Instructions. \"  Current as of: Pt requesting levetiracetam refill to go to her mail order [address] She has annual f/up scheduled for June 8th, phone visit. May 12, 2017  Content Version: 11.4  © 9896-5807 [employer]. Care instructions adapted under license by [employer] (which disclaims liability or warranty for this information). If you have questions about a medical condition or this instruction, always ask your healthcare professional. [address] any warranty or liability for your use of this information.

## 2020-06-24 ENCOUNTER — VIRTUAL VISIT (OUTPATIENT)
Dept: INTERNAL MEDICINE CLINIC | Age: 65
End: 2020-06-24

## 2020-06-24 DIAGNOSIS — E55.9 VITAMIN D DEFICIENCY: ICD-10-CM

## 2020-06-24 DIAGNOSIS — I10 ESSENTIAL HYPERTENSION: Primary | ICD-10-CM

## 2020-06-24 DIAGNOSIS — E78.2 MIXED HYPERLIPIDEMIA: ICD-10-CM

## 2020-06-24 DIAGNOSIS — E66.9 OBESITY (BMI 30.0-34.9): ICD-10-CM

## 2020-06-24 DIAGNOSIS — Z12.39 SCREENING BREAST EXAMINATION: ICD-10-CM

## 2020-06-24 DIAGNOSIS — Z78.0 MENOPAUSE: ICD-10-CM

## 2020-06-24 RX ORDER — SIMVASTATIN 20 MG/1
TABLET, FILM COATED ORAL
Qty: 90 TAB | Refills: 1 | Status: SHIPPED | OUTPATIENT
Start: 2020-06-24 | End: 2021-04-19 | Stop reason: SDUPTHER

## 2020-06-24 RX ORDER — BISOPROLOL FUMARATE AND HYDROCHLOROTHIAZIDE 5; 6.25 MG/1; MG/1
1 TABLET ORAL
Qty: 90 TAB | Refills: 1 | Status: SHIPPED | OUTPATIENT
Start: 2020-06-24 | End: 2021-01-05

## 2020-06-24 NOTE — PROGRESS NOTES
Rhoda Solis is a 59 y.o. female who was seen by synchronous (real-time) audio-video technology on 6/24/2020. Consent: Rhoda Solis, who was seen by synchronous (real-time) audio-video technology, and/or her healthcare decision maker, is aware that this patient-initiated, Telehealth encounter on 6/24/2020 is a billable service, with coverage as determined by her insurance carrier. She is aware that she may receive a bill and has provided verbal consent to proceed: Yes. Assessment & Plan:   Diagnoses and all orders for this visit:    1. Essential hypertension    Stable blood pressure. Will refill,  -     bisoprolol-hydroCHLOROthiazide (ZIAC) 5-6.25 mg per tablet; Take 1 Tab by mouth every morning.  -     CBC WITH AUTOMATED DIFF  -     METABOLIC PANEL, COMPREHENSIVE    2. Mixed hyperlipidemia    Stable. Will refill,  -     simvastatin (ZOCOR) 20 mg tablet; 1 tab po HS  -     LIPID PANEL  -     METABOLIC PANEL, COMPREHENSIVE    3. Obesity (BMI 30.0-34. 9)  Addressed weight, diet and exercise with patient. Decrease carbohydrates (white foods, sweet foods, sweet drinks and alcohol), increase green leafy vegetables and protein (lean meats and beans) with each meal. Avoid fried foods. Eat 3-5 small meals daily. Do not skip meals. Increase water intake. Increase physical activity to 30 minutes daily for health benefit or 60 minutes daily to prevent weight regain, as tolerated. Get 7-8 hours uninterrupted sleep nightly. 4. Vitamin D deficiency    We will check,  -     VITAMIN D, 25 HYDROXY    5. Screening breast examination  She refused to do any mammogram.  I have convinced her that she needs to do mammogram in order not to get advanced breast cancer. She verbalized understanding. Will order,  -     YARELI MAMMO BI SCREENING INCL CAD; Future    6. Menopause    Have more calcium rich diet. Will check,  -     DEXA BONE DENSITY STUDY AXIAL;  Future        I spent at least 25 minutes on this visit with this established patient. Subjective:   Marcos Fiore is a 59 y.o. female who was seen for Hypertension; Cholesterol Problem; Obesity; and Depression (related to social unrest at present and friends with chronic illnesses )  Ms. Baron Luther is working full-time. She is very cautious. Doing well. Has hypertension and hyperlipidemia, compliant with medications. Blood pressure seems stable. Denies chest pain palpitation or shortness of breath. [facility] refused to do any mammogram.  Last mammogram done 2 years back. She thinks doing mammogram might cause a breast cancer. Need bone density. Prior to Admission medications    Medication Sig Start Date End Date Taking? Authorizing Provider   simvastatin (ZOCOR) 20 mg tablet 1 tab po HS 6/24/20  Yes Ni Max MD   bisoprolol-hydroCHLOROthiazide [facility]) 5-6.25 mg per tablet Take 1 Tab by mouth every morning. 6/24/20  Yes Ni Max MD   calcium carbonate (TUMS) 200 mg calcium (500 mg) chew Take 1 Tab by mouth daily as needed. Yes Provider, Historical   valACYclovir (VALTREX) 500 mg tablet Take  by mouth two (2) times daily as needed. Yes Provider, Historical   bisoprolol-hydroCHLOROthiazide [facility]) 5-6.25 mg per tablet Take 1 tablet by mouth once daily 3/30/20 6/24/20  Ni Max MD   simvastatin (ZOCOR) 20 mg tablet Take 1 tablet by mouth nightly 3/27/20 6/24/20  Andres Man NP   ciprofloxacin HCl (CIPRO) 500 mg tablet Take 1 Tab by mouth two (2) times a day.  1/8/20 6/24/20  Ni Max MD   atovaquone-proguanil (MALARONE) 250-100 mg per tablet Take one tablet 2 days before trip (12/24) and take the second tablet 1 week later (12/31)  Indications: prevention of falciparum malaria that is resistant to chloroquine, prevention of falciparum malaria, Preventive Treatment of Vivax Malaria 12/16/19 6/24/20  Ni Max MD     No Known Allergies    Past Medical History:   Diagnosis Date    Herpes     High cholesterol     Hypercholesterolemia     Hypertension ROS negative. Objective:   Vital Signs: (As obtained by patient/caregiver at home)  There were no vitals taken for this visit. Constitutional: [x] Appears well-developed and well-nourished [x] No apparent distress      [] Abnormal -     Mental status: [x] Alert and awake  [x] Oriented to person/place/time [x] Able to follow commands    [] Abnormal -       HENT: [x] Normocephalic, atraumatic  [] Abnormal -   [x] Mouth/Throat: Mucous membranes are moist    External Ears [x] Normal  [] Abnormal -    Neck: [x] No visualized mass [] Abnormal -     Pulmonary/Chest: [x] Respiratory effort normal   [x] No visualized signs of difficulty breathing or respiratory distress        [] Abnormal -      Musculoskeletal:   [x] Normal gait with no signs of ataxia         [x] Normal range of motion of neck        [] Abnormal -     Neurological:        [x] No Facial Asymmetry (Cranial nerve 7 motor function) (limited exam due to video visit)          [x] No gaze palsy        [] Abnormal -          Skin:        [x] No significant exanthematous lesions or discoloration noted on facial skin         [] Abnormal -            Psychiatric:       [x] Normal Affect [] Abnormal -        [x] No Hallucinations    Other pertinent observable physical exam findings:-        We discussed the expected course, resolution and complications of the diagnosis(es) in detail. Medication risks, benefits, costs, interactions, and alternatives were discussed as indicated. I advised her to contact the office if her condition worsens, changes or fails to improve as anticipated. She expressed understanding with the diagnosis(es) and plan. Moreno Castano is a 59 y.o. female who was evaluated by a video visit encounter for concerns as above. Patient identification was verified prior to start of the visit. A caregiver was present when appropriate.  Due to this being a TeleHealth encounter (During [facility]-14 public health emergency), evaluation of the following organ systems was limited: Vitals/Constitutional/EENT/Resp/CV/GI//MS/Neuro/Skin/Heme-Lymph-Imm. Pursuant to the emergency declaration under the [address] [place] [address], [facility] waiver authority and the [employer] Act, this Virtual  Visit was conducted, with patient's (and/or legal guardian's) consent, to reduce the patient's risk of exposure to COVID-19 and provide necessary medical care. Services were provided through a video synchronous discussion virtually to substitute for in-person clinic visit. Patient and provider were located at their individual homes.       Jaycob Moran MD

## 2020-06-24 NOTE — PROGRESS NOTES
Health Maintenance Due   Topic Date Due    Hepatitis C Screening  1955    Shingrix Vaccine Age 50> (1 of 2) 11/18/2005    FOBT Q1Y Age 50-75  11/18/2005    PAP AKA CERVICAL CYTOLOGY  07/02/2017    Breast Cancer Screen Mammogram  06/23/2019    Pneumococcal 0-64 years (1 of 1 - PPSV23) 07/24/2019    Lipid Screen  05/29/2020       Chief Complaint   Patient presents with    Hypertension    Cholesterol Problem    Obesity       1. Have you been to the ER, urgent care clinic since your last visit? Hospitalized since your last visit? No    2. Have you seen or consulted any other health care providers outside of the [address] [place] [address] since your last visit? Include any pap smears or colon screening. No    3) Do you have an Advance Directive on file? no    4) Are you interested in receiving information on Advance Directives? NO      Patient is accompanied by self I have received verbal consent from Rudy Bolivar to discuss any/all medical information while they are present in the room.

## 2020-07-01 LAB
25(OH)D3+25(OH)D2 SERPL-MCNC: 25.6 NG/ML (ref 30–100)
ALBUMIN SERPL-MCNC: 4.3 G/DL (ref 3.8–4.8)
ALBUMIN/GLOB SERPL: 1.9 {RATIO} (ref 1.2–2.2)
ALP SERPL-CCNC: 85 IU/L (ref 39–117)
ALT SERPL-CCNC: 13 IU/L (ref 0–32)
AST SERPL-CCNC: 15 IU/L (ref 0–40)
BASOPHILS # BLD AUTO: 0 X10E3/UL (ref 0–0.2)
BASOPHILS NFR BLD AUTO: 0 %
BILIRUB SERPL-MCNC: 0.4 MG/DL (ref 0–1.2)
BUN SERPL-MCNC: 11 MG/DL (ref 8–27)
BUN/CREAT SERPL: 14 (ref 12–28)
CALCIUM SERPL-MCNC: 9.8 MG/DL (ref 8.7–10.3)
CHLORIDE SERPL-SCNC: 103 MMOL/L (ref 96–106)
CHOLEST SERPL-MCNC: 190 MG/DL (ref 100–199)
CO2 SERPL-SCNC: 24 MMOL/L (ref 20–29)
CREAT SERPL-MCNC: 0.79 MG/DL (ref 0.57–1)
EOSINOPHIL # BLD AUTO: 0.2 X10E3/UL (ref 0–0.4)
EOSINOPHIL NFR BLD AUTO: 3 %
ERYTHROCYTE [DISTWIDTH] IN BLOOD BY AUTOMATED COUNT: 12.6 % (ref 11.7–15.4)
GLOBULIN SER CALC-MCNC: 2.3 G/DL (ref 1.5–4.5)
GLUCOSE SERPL-MCNC: 100 MG/DL (ref 65–99)
HCT VFR BLD AUTO: 40.7 % (ref 34–46.6)
HDLC SERPL-MCNC: 58 MG/DL
HGB BLD-MCNC: 13.3 G/DL (ref 11.1–15.9)
IMM GRANULOCYTES # BLD AUTO: 0 X10E3/UL (ref 0–0.1)
IMM GRANULOCYTES NFR BLD AUTO: 0 %
INTERPRETATION, 910389: NORMAL
LDLC SERPL CALC-MCNC: 112 MG/DL (ref 0–99)
LYMPHOCYTES # BLD AUTO: 1.7 X10E3/UL (ref 0.7–3.1)
LYMPHOCYTES NFR BLD AUTO: 34 %
MCH RBC QN AUTO: 31.3 PG (ref 26.6–33)
MCHC RBC AUTO-ENTMCNC: 32.7 G/DL (ref 31.5–35.7)
MCV RBC AUTO: 96 FL (ref 79–97)
MONOCYTES # BLD AUTO: 0.4 X10E3/UL (ref 0.1–0.9)
MONOCYTES NFR BLD AUTO: 9 %
NEUTROPHILS # BLD AUTO: 2.7 X10E3/UL (ref 1.4–7)
NEUTROPHILS NFR BLD AUTO: 54 %
PLATELET # BLD AUTO: 303 X10E3/UL (ref 150–450)
POTASSIUM SERPL-SCNC: 4.2 MMOL/L (ref 3.5–5.2)
PROT SERPL-MCNC: 6.6 G/DL (ref 6–8.5)
RBC # BLD AUTO: 4.25 X10E6/UL (ref 3.77–5.28)
SODIUM SERPL-SCNC: 141 MMOL/L (ref 134–144)
TRIGL SERPL-MCNC: 102 MG/DL (ref 0–149)
VLDLC SERPL CALC-MCNC: 20 MG/DL (ref 5–40)
WBC # BLD AUTO: 5 X10E3/UL (ref 3.4–10.8)

## 2020-07-06 NOTE — PROGRESS NOTES
Slightly limited fasting blood sugar, need to watch carbohydrate. LDL slightly elevated. Need to watch fatty food. Low vitamin D, advised to take vitamin D3 1000 units once a day for 4 months.

## 2020-07-10 ENCOUNTER — HOSPITAL ENCOUNTER (OUTPATIENT)
Dept: BONE DENSITY | Age: 65
Discharge: HOME | End: 2020-07-10
Attending: INTERNAL MEDICINE
Payer: COMMERCIAL

## 2020-07-10 ENCOUNTER — HOSPITAL ENCOUNTER (OUTPATIENT)
Dept: MAMMOGRAPHY | Age: 65
Discharge: HOME | End: 2020-07-10
Attending: INTERNAL MEDICINE
Payer: COMMERCIAL

## 2020-07-10 DIAGNOSIS — Z12.31 VISIT FOR SCREENING MAMMOGRAM: ICD-10-CM

## 2020-07-10 DIAGNOSIS — Z78.0 MENOPAUSE: ICD-10-CM

## 2020-07-10 PROCEDURE — 77080 DXA BONE DENSITY AXIAL: CPT

## 2020-07-10 PROCEDURE — 77067 SCR MAMMO BI INCL CAD: CPT

## 2020-07-20 ENCOUNTER — OFFICE VISIT (OUTPATIENT)
Dept: INTERNAL MEDICINE CLINIC | Age: 65
End: 2020-07-20

## 2020-07-20 VITALS
SYSTOLIC BLOOD PRESSURE: 144 MMHG | RESPIRATION RATE: 16 BRPM | DIASTOLIC BLOOD PRESSURE: 90 MMHG | WEIGHT: 190.4 LBS | HEART RATE: 81 BPM | TEMPERATURE: 99 F | HEIGHT: 64 IN | OXYGEN SATURATION: 98 % | BODY MASS INDEX: 32.5 KG/M2

## 2020-07-20 DIAGNOSIS — E66.9 OBESITY (BMI 30.0-34.9): ICD-10-CM

## 2020-07-20 DIAGNOSIS — I10 ESSENTIAL HYPERTENSION: Primary | ICD-10-CM

## 2020-07-20 DIAGNOSIS — E78.2 MIXED HYPERLIPIDEMIA: ICD-10-CM

## 2020-07-20 RX ORDER — HYDROCHLOROTHIAZIDE 12.5 MG/1
12.5 TABLET ORAL DAILY
Qty: 30 TAB | Refills: 2 | Status: SHIPPED | OUTPATIENT
Start: 2020-07-20 | End: 2020-10-19 | Stop reason: SDUPTHER

## 2020-07-20 RX ORDER — ASPIRIN 325 MG
325 TABLET ORAL DAILY
COMMUNITY
End: 2021-04-19 | Stop reason: ALTCHOICE

## 2020-07-20 RX ORDER — FLUTICASONE PROPIONATE 50 MCG
1 SPRAY, SUSPENSION (ML) NASAL DAILY
COMMUNITY

## 2020-07-20 NOTE — PROGRESS NOTES
HISTORY OF PRESENT ILLNESS  Kristi Arevalo is a 59 y.o. female here for follow up. She has been suffering from elevated blood pressure for past 2 days. Report dizziness and has some sparkling in her eye. She is at all. She monitored her blood pressure and found her  blood pressure over 200/120. She called me yesterday after our and I have asked her to increase her Ziac to 2 tablets for tomorrow. Later on she monitored blood pressure, blood pressure came down to 150/85.,  He was started feeling better. She was started taking Ziac 1 tablet this morning and came here. Blood pressure is 144/90, and she is feeling okay. She figured it out that she has been having a lot of salty food lately including garcia, biscuits and chips. Also she started drinking alcohol at night since this pandemic. She feels stressed and depressed. No chest pain palpitation or shortness of breath right now. Has elevated lipid. on statin. no myalgia. Lab discussed with her, had elevated fasting blood sugar. Medication Refill     Hypertension      Cholesterol Problem     Depression         Review of Systems   Constitutional: Negative. Eyes: Negative. Respiratory: Negative. Cardiovascular: Negative. Gastrointestinal: Negative. Genitourinary: Negative. Musculoskeletal: Negative. Negative for falls. Skin: Negative. Neurological: Negative. Psychiatric/Behavioral: Positive for depression. Negative for suicidal ideas. Physical Exam  Constitutional:       General: She is not in acute distress. Appearance: Normal appearance. She is well-developed. Neck:      Musculoskeletal: Normal range of motion and neck supple. Thyroid: No thyromegaly. Vascular: No JVD. Cardiovascular:      Rate and Rhythm: Normal rate and regular rhythm. Heart sounds: Normal heart sounds. Pulmonary:      Effort: Pulmonary effort is normal. No respiratory distress. Breath sounds: Normal breath sounds.  No wheezing. Abdominal:      Comments: \   Musculoskeletal: Normal range of motion. Neurological:      Mental Status: She is alert. Motor: No abnormal muscle tone. Psychiatric:         Mood and Affect: Mood normal.         Behavior: Behavior normal.      Comments: stressed         ASSESSMENT and PLAN  Diagnoses and all orders for this visit:    1. Essential hypertension    BP slightly elevated. Will continue ziac. 1 tab po every day. Will add,  -     hydroCHLOROthiazide (HYDRODIURIL) 12.5 mg tablet; Take 1 Tab by mouth daily. .She is eating more salty food. adv to be on DASH diet. 2. Mixed hyperlipidemia    On low chol diet and exercise. 3. Obesity (BMI 30.0-34. 9)    Addressed weight, diet and exercise with patient. Decrease carbohydrates (white foods, sweet foods, sweet drinks and alcohol), increase green leafy vegetables and protein (lean meats and beans) with each meal. Avoid fried foods. Eat 3-5 small meals daily. Do not skip meals. Increase water intake. Increase physical activity to 30 minutes daily for health benefit or 60 minutes daily to prevent weight regain, as tolerated. Get 7-8 hours uninterrupted sleep nightly. Discussed expected course/resolution/complications of diagnosis in detail with patient. Medication risks/benefits/costs/interactions/alternatives discussed with patient. Pt was given an after visit summary which includes diagnoses, current medications & vitals. Pt expressed understanding with the diagnosis and plan.

## 2020-07-20 NOTE — PATIENT INSTRUCTIONS
DASH Diet: Care Instructions  Your Care Instructions     The DASH diet is an eating plan that can help lower your blood pressure. DASH stands for Dietary Approaches to Stop Hypertension. Hypertension is high blood pressure. The DASH diet focuses on eating foods that are high in calcium, potassium, and magnesium. These nutrients can lower blood pressure. The foods that are highest in these nutrients are fruits, vegetables, low-fat dairy products, nuts, seeds, and legumes. But taking calcium, potassium, and magnesium supplements instead of eating foods that are high in those nutrients does not have the same effect. The DASH diet also includes whole grains, fish, and poultry. The DASH diet is one of several lifestyle changes your doctor may recommend to lower your high blood pressure. Your doctor may also want you to decrease the amount of sodium in your diet. Lowering sodium while following the DASH diet can lower blood pressure even further than just the DASH diet alone. Follow-up care is a key part of your treatment and safety. Be sure to make and go to all appointments, and call your doctor if you are having problems. It's also a good idea to know your test results and keep a list of the medicines you take. How can you care for yourself at home? Following the DASH diet  · Eat 4 to 5 servings of fruit each day. A serving is 1 medium-sized piece of fruit, ½ cup chopped or canned fruit, 1/4 cup dried fruit, or 4 ounces (½ cup) of fruit juice. Choose fruit more often than fruit juice. · Eat 4 to 5 servings of vegetables each day. A serving is 1 cup of lettuce or raw leafy vegetables, ½ cup of chopped or cooked vegetables, or 4 ounces (½ cup) of vegetable juice. Choose vegetables more often than vegetable juice. · Get 2 to 3 servings of low-fat and fat-free dairy each day. A serving is 8 ounces of milk, 1 cup of yogurt, or 1 ½ ounces of cheese. · Eat 6 to 8 servings of grains each day.  A serving is 1 slice of bread, 1 ounce of dry cereal, or ½ cup of cooked rice, pasta, or cooked cereal. Try to choose whole-grain products as much as possible. · Limit lean meat, poultry, and fish to 2 servings each day. A serving is 3 ounces, about the size of a deck of cards. · Eat 4 to 5 servings of nuts, seeds, and legumes (cooked dried beans, lentils, and split peas) each week. A serving is 1/3 cup of nuts, 2 tablespoons of seeds, or ½ cup of cooked beans or peas. · Limit fats and oils to 2 to 3 servings each day. A serving is 1 teaspoon of vegetable oil or 2 tablespoons of salad dressing. · Limit sweets and added sugars to 5 servings or less a week. A serving is 1 tablespoon jelly or jam, ½ cup sorbet, or 1 cup of lemonade. · Eat less than 2,300 milligrams (mg) of sodium a day. If you limit your sodium to 1,500 mg a day, you can lower your blood pressure even more. Tips for success  · Start small. Do not try to make dramatic changes to your diet all at once. You might feel that you are missing out on your favorite foods and then be more likely to not follow the plan. Make small changes, and stick with them. Once those changes become habit, add a few more changes. · Try some of the following:  ? Make it a goal to eat a fruit or vegetable at every meal and at snacks. This will make it easy to get the recommended amount of fruits and vegetables each day. ? Try yogurt topped with fruit and nuts for a snack or healthy dessert. ? Add lettuce, tomato, cucumber, and onion to sandwiches. ? Combine a ready-made pizza crust with low-fat mozzarella cheese and lots of vegetable toppings. Try using tomatoes, squash, spinach, broccoli, carrots, cauliflower, and onions. ? Have a variety of cut-up vegetables with a low-fat dip as an appetizer instead of chips and dip. ? Sprinkle sunflower seeds or chopped almonds over salads. Or try adding chopped walnuts or almonds to cooked vegetables.   ? Try some vegetarian meals using beans and peas. Add garbanzo or kidney beans to salads. Make burritos and tacos with mashed snow beans or black beans. Where can you learn more? Go to http://chip-chetan.info/  Enter H967 in the search box to learn more about \"DASH Diet: Care Instructions. \"  Current as of: December 16, 2019               Content Version: 12.5  © 2649-7291 [employer]. Care instructions adapted under license by [employer] (which disclaims liability or warranty for this information). If you have questions about a medical condition or this instruction, always ask your healthcare professional. [address] any warranty or liability for your use of this information.

## 2020-07-20 NOTE — PROGRESS NOTES
Health Maintenance Due   Topic Date Due    Hepatitis C Screening  1955    Shingrix Vaccine Age 50> (1 of 2) 11/18/2005    FOBT Q1Y Age 50-75  11/18/2005    PAP AKA CERVICAL CYTOLOGY  07/02/2017    Pneumococcal 0-64 years (1 of 1 - PPSV23) 07/24/2019       Chief Complaint   Patient presents with    Hypertension    Cholesterol Problem    Nicotine Dependence       1. Have you been to the ER, urgent care clinic since your last visit? Hospitalized since your last visit? No    2. Have you seen or consulted any other health care providers outside of the [address] [place] [address] since your last visit? Include any pap smears or colon screening. No    3) Do you have an Advance Directive on file? no    4) Are you interested in receiving information on Advance Directives? NO      Patient is accompanied by self I have received verbal consent from [facility] to discuss any/all medical information while they are present in the room.

## 2020-10-19 ENCOUNTER — OFFICE VISIT (OUTPATIENT)
Dept: INTERNAL MEDICINE CLINIC | Age: 65
End: 2020-10-19
Payer: COMMERCIAL

## 2020-10-19 VITALS
HEART RATE: 58 BPM | BODY MASS INDEX: 32.88 KG/M2 | RESPIRATION RATE: 18 BRPM | HEIGHT: 64 IN | DIASTOLIC BLOOD PRESSURE: 84 MMHG | OXYGEN SATURATION: 98 % | TEMPERATURE: 97.7 F | SYSTOLIC BLOOD PRESSURE: 136 MMHG | WEIGHT: 192.6 LBS

## 2020-10-19 DIAGNOSIS — I10 ESSENTIAL HYPERTENSION: Primary | ICD-10-CM

## 2020-10-19 DIAGNOSIS — R29.898 TMJ CLICK: ICD-10-CM

## 2020-10-19 DIAGNOSIS — E78.2 MIXED HYPERLIPIDEMIA: ICD-10-CM

## 2020-10-19 DIAGNOSIS — H60.501 ACUTE OTITIS EXTERNA OF RIGHT EAR, UNSPECIFIED TYPE: ICD-10-CM

## 2020-10-19 DIAGNOSIS — Z11.59 NEED FOR HEPATITIS C SCREENING TEST: ICD-10-CM

## 2020-10-19 DIAGNOSIS — E55.9 VITAMIN D DEFICIENCY: ICD-10-CM

## 2020-10-19 DIAGNOSIS — E66.9 OBESITY (BMI 30.0-34.9): ICD-10-CM

## 2020-10-19 PROCEDURE — 99214 OFFICE O/P EST MOD 30 MIN: CPT | Performed by: INTERNAL MEDICINE

## 2020-10-19 RX ORDER — HYDROCHLOROTHIAZIDE 12.5 MG/1
12.5 TABLET ORAL DAILY
Qty: 90 TAB | Refills: 1 | Status: SHIPPED | OUTPATIENT
Start: 2020-10-19 | End: 2021-04-05

## 2020-10-19 RX ORDER — CIPROFLOXACIN AND DEXAMETHASONE 3; 1 MG/ML; MG/ML
4 SUSPENSION/ DROPS AURICULAR (OTIC) 2 TIMES DAILY
Qty: 7.5 ML | Refills: 0 | Status: SHIPPED | OUTPATIENT
Start: 2020-10-19 | End: 2021-04-19 | Stop reason: ALTCHOICE

## 2020-10-19 NOTE — PATIENT INSTRUCTIONS
Temporomandibular Disorder: Care Instructions Your Care Instructions Temporomandibular (TM) disorders are a problem with the muscles and joints that connect your jaw to your skull. They cause pain when you open your mouth, chew, or yawn. You may feel this pain on one or both sides. TM disorders are often caused by tight jaw muscles. The tightness can be caused by clenching or grinding your teeth. This may happen when you have a lot of stress in your life. If you lower your stress, you may be able to stop clenching or grinding your teeth. This will help relax your jaw and reduce your pain. You may also be able to do some things at home to feel better. But if none of this works, your doctor may prescribe medicine to help relax your muscles and control the pain. Follow-up care is a key part of your treatment and safety. Be sure to make and go to all appointments, and call your doctor if you are having problems. It's also a good idea to know your test results and keep a list of the medicines you take. How can you care for yourself at home? · Put a warm, moist cloth or heating pad set on low on your jaw. Do this for 10 to 20 minutes at a time. Put a thin cloth between the heating pad and your skin. · Avoid hard or chewy foods that cause your jaws to work very hard. Examples include popcorn, jerky, tough meats, chewy breads, gum, and raw apples and carrots. · Choose softer foods that are easy to chew. These include eggs, yogurt, and soup. · Cut your food into small pieces. Chew slowly. · If your jaw gets too painful to chew, or if it locks, you may need to puree your food for a few days or weeks. · To relax your jaw, repeat this exercise for a few minutes every morning and evening. Watch yourself in a mirror. Gently open and close your mouth. Move your jaw straight up and down. But don't do this if it makes your pain worse.  
· Get at least 30 minutes of exercise on most days of the week to relieve stress. Walking is a good choice. You also may want to do other activities, such as running, swimming, cycling, or playing tennis or team sports. · Do not: 
? Hold a phone between your shoulder and your jaw. ? Open your mouth all the way, like when you sing loudly or yawn. ? Clench or grind your teeth, bite your lips, or chew your fingernails. ? Clench things such as pens, pipes, or cigars between your teeth. When should you call for help? Call your doctor now or seek immediate medical care if: 
  · Your jaw is locked open or shut or it is hard to move your jaw. Watch closely for changes in your health, and be sure to contact your doctor if: 
  · Your jaw pain gets worse.  
  · Your face is swollen.  
  · You do not get better as expected. Where can you learn more? Go to http://www.gray.com/ Enter S677 in the search box to learn more about \"Temporomandibular Disorder: Care Instructions. \" Current as of: March 25, 2020               Content Version: 12.6 © 6681-8999 [employer]. Care instructions adapted under license by [employer] (which disclaims liability or warranty for this information). If you have questions about a medical condition or this instruction, always ask your healthcare professional. [address] any warranty or liability for your use of this information.

## 2020-10-19 NOTE — PROGRESS NOTES
Health Maintenance Due   Topic Date Due    Hepatitis C Screening  1955    Shingrix Vaccine Age 50> (1 of 2) 11/18/2005    FOBT Q1Y Age 50-75  11/18/2005    PAP AKA CERVICAL CYTOLOGY  07/02/2017    Pneumococcal 0-64 years (1 of 1 - PPSV23) 07/24/2019       Chief Complaint   Patient presents with    Hypertension    Hypothyroidism    Obesity    Ear Fullness     right ear       1. Have you been to the ER, urgent care clinic since your last visit? Hospitalized since your last visit? No    2. Have you seen or consulted any other health care providers outside of the [address] [place] [address] since your last visit? Include any pap smears or colon screening. No    3) Do you have an Advance Directive on file? no    4) Are you interested in receiving information on Advance Directives? NO      Patient is accompanied by self I have received verbal consent from [facility] to discuss any/all medical information while they are present in the room.

## 2020-10-19 NOTE — PROGRESS NOTES
HISTORY OF PRESENT ILLNESS  Forrest Martinez is a 59 y.o. female here for follow up. Report pain on the right side of the ear. She can hear the click on the joint in the ear. No fever but here her symptoms. Has hypertension blood pressure is okay, she did not increase her blood pressure medications. No chest pain or palpitation. Has elevated lipid. on statin. no myalgia. Need lab work. Refused flu vaccine and Shingrix vaccine. Mammogram and bone density up-to-date. Medication Refill     Hypertension      Obesity     Ear Fullness          Review of Systems   Constitutional: Negative. HENT: Positive for ear pain. Eyes: Negative. Respiratory: Negative. Cardiovascular: Negative. Gastrointestinal: Negative. Genitourinary: Negative. Musculoskeletal: Negative. Negative for falls. Skin: Negative. Neurological: Negative. Endo/Heme/Allergies: Negative. Psychiatric/Behavioral: Negative. Negative for depression and suicidal ideas. Physical Exam  Constitutional:       General: She is not in acute distress. Appearance: Normal appearance. She is well-developed. She is obese. HENT:      Head: Normocephalic and atraumatic. Comments: Right ear canal: Mild tenderness in the canal.  No fluid in the eardrum. No wax. Right TMJ: Point tenderness in the TMJ, TMJ click present. Right Ear: Tympanic membrane normal.      Left Ear: Tympanic membrane normal.      Mouth/Throat:      Pharynx: No oropharyngeal exudate. Neck:      Musculoskeletal: Normal range of motion and neck supple. Thyroid: No thyromegaly. Vascular: No JVD. Cardiovascular:      Rate and Rhythm: Normal rate and regular rhythm. Heart sounds: Normal heart sounds. Pulmonary:      Effort: Pulmonary effort is normal. No respiratory distress. Breath sounds: Normal breath sounds. No wheezing. Abdominal:      General: Abdomen is flat. Bowel sounds are normal. There is no distension. Palpations: Abdomen is soft. Tenderness: There is no abdominal tenderness. Comments: Bowel sounds slightly hyperactive. Musculoskeletal: Normal range of motion. Neurological:      Mental Status: She is alert. Motor: No abnormal muscle tone. Psychiatric:         Mood and Affect: Mood normal.         Behavior: Behavior normal.         ASSESSMENT and PLAN  Diagnoses and all orders for this visit:    1. Essential hypertension    Stable blood pressure. Will continue him on Ziac and hydrochlorothiazide. Will check,  -     METABOLIC PANEL, COMPREHENSIVE  -     hydroCHLOROthiazide (HYDRODIURIL) 12.5 mg tablet; Take 1 Tab by mouth daily. 2. Obesity (BMI 30.0-34. 9)  Addressed weight, diet and exercise with patient. Decrease carbohydrates (white foods, sweet foods, sweet drinks and alcohol), increase green leafy vegetables and protein (lean meats and beans) with each meal. Avoid fried foods. Eat 3-5 small meals daily. Do not skip meals. Increase water intake. Increase physical activity to 30 minutes daily for health benefit or 60 minutes daily to prevent weight regain, as tolerated. Get 7-8 hours uninterrupted sleep nightly. 3. Mixed hyperlipidemia  On statin. Doing well. 4. Vitamin D deficiency  Taking supplement. 5. Need for hepatitis C screening test    We will check,  -     HEPATITIS C AB    6. Acute otitis externa of right ear, unspecified type    We will give,  -     ciprofloxacin-dexamethasone (CIPRODEX) 0.3-0.1 % otic suspension; Administer 4 Drops in left ear two (2) times a day. 7. TMJ click    Need not to chew any hard candy. Need to do exercise. Discussed expected course/resolution/complications of diagnosis in detail with patient. Medication risks/benefits/costs/interactions/alternatives discussed with patient. Pt was given an after visit summary which includes diagnoses, current medications & vitals. Pt expressed understanding with the diagnosis and plan.

## 2020-10-20 LAB
ALBUMIN SERPL-MCNC: 4.4 G/DL (ref 3.8–4.8)
ALBUMIN/GLOB SERPL: 1.9 {RATIO} (ref 1.2–2.2)
ALP SERPL-CCNC: 110 IU/L (ref 39–117)
ALT SERPL-CCNC: 16 IU/L (ref 0–32)
AST SERPL-CCNC: 21 IU/L (ref 0–40)
BILIRUB SERPL-MCNC: 0.3 MG/DL (ref 0–1.2)
BUN SERPL-MCNC: 10 MG/DL (ref 8–27)
BUN/CREAT SERPL: 13 (ref 12–28)
CALCIUM SERPL-MCNC: 9.7 MG/DL (ref 8.7–10.3)
CHLORIDE SERPL-SCNC: 101 MMOL/L (ref 96–106)
CO2 SERPL-SCNC: 26 MMOL/L (ref 20–29)
CREAT SERPL-MCNC: 0.76 MG/DL (ref 0.57–1)
GLOBULIN SER CALC-MCNC: 2.3 G/DL (ref 1.5–4.5)
GLUCOSE SERPL-MCNC: 115 MG/DL (ref 65–99)
HCV AB S/CO SERPL IA: <0.1 S/CO RATIO (ref 0–0.9)
POTASSIUM SERPL-SCNC: 4.3 MMOL/L (ref 3.5–5.2)
PROT SERPL-MCNC: 6.7 G/DL (ref 6–8.5)
SODIUM SERPL-SCNC: 140 MMOL/L (ref 134–144)

## 2020-11-03 ENCOUNTER — VIRTUAL VISIT (OUTPATIENT)
Dept: INTERNAL MEDICINE CLINIC | Age: 65
End: 2020-11-03
Payer: MEDICARE

## 2020-11-03 DIAGNOSIS — R31.9 HEMATURIA, UNSPECIFIED TYPE: Primary | ICD-10-CM

## 2020-11-03 DIAGNOSIS — E66.9 OBESITY (BMI 30.0-34.9): ICD-10-CM

## 2020-11-03 DIAGNOSIS — I10 ESSENTIAL HYPERTENSION: ICD-10-CM

## 2020-11-03 PROCEDURE — 99214 OFFICE O/P EST MOD 30 MIN: CPT | Performed by: INTERNAL MEDICINE

## 2020-11-03 NOTE — PROGRESS NOTES
William Abbott is a 59 y.o. female, evaluated via audio-only technology on 11/3/2020 for No chief complaint on file. .    Assessment & Plan:   Diagnoses and all orders for this visit:    1. Hematuria, unspecified type  -     CBC WITH AUTOMATED DIFF  -     URINALYSIS W/ RFLX MICROSCOPIC    2. Essential hypertension    3. Obesity (BMI 30.0-34.9)        12  Subjective:     Patient reports that 2 weeks ago, on 26th she had breakthrough (spotting) bleeding as she described as red, but now bright. Reports that yesterday she noticed her urine looked pink, and this morning noticed it was red. Reports using a vibrator in the last week, 2 times. But reports it was only used externally. States that she began taking a aspirin 81 mg daily, 3 weeks ago. Last period was 10 years ago     No pain, discharge or clots     Reports that in the last few days she has drank beet juice. In need of PAP. Reports an increased in anxiety over the death of her close friend. Reports that she feels fine, just concerned. No, CP, fever, coolness, bruising       Prior to Admission medications    Medication Sig Start Date End Date Taking? Authorizing Provider   ciprofloxacin-dexamethasone (CIPRODEX) 0.3-0.1 % otic suspension Administer 4 Drops in left ear two (2) times a day. 10/19/20   Lv Morton MD   hydroCHLOROthiazide (HYDRODIURIL) 12.5 mg tablet Take 1 Tab by mouth daily. 10/19/20   Lv Morton MD   Cetirizine 10 mg cap Take 10 mg by mouth daily. Provider, Historical   fluticasone propionate (Flonase Allergy Relief) 50 mcg/actuation nasal spray 2 Sprays by Both Nostrils route daily. Provider, Historical   aspirin (ASPIRIN) 325 mg tablet Take 325 mg by mouth daily.     Provider, Historical   simvastatin (ZOCOR) 20 mg tablet 1 tab po HS 6/24/20   Lv Morton MD   bisoprolol-hydroCHLOROthiazide [facility]) 5-6.25 mg per tablet Take 1 Tab by mouth every morning. 6/24/20   Lv Morton MD   calcium carbonate (TUMS) 200 mg calcium (500 mg) chew Take 1 Tab by mouth daily as needed. Provider, Historical   valACYclovir (VALTREX) 500 mg tablet Take  by mouth two (2) times daily as needed. Provider, Historical     Patient Active Problem List   Diagnosis Code    HTN (hypertension) I10    Mixed hyperlipidemia E78.2    Obesity (BMI 30.0-34. 9) E66.9    Vitamin D deficiency E55.9    Smoking F17.200     Patient Active Problem List    Diagnosis Date Noted    Vitamin D deficiency 04/17/2018    Smoking 04/17/2018    Obesity (BMI 30.0-34.9) 08/10/2016    HTN (hypertension) 12/17/2012    Mixed hyperlipidemia 12/17/2012     Current Outpatient Medications   Medication Sig Dispense Refill    ciprofloxacin-dexamethasone (CIPRODEX) 0.3-0.1 % otic suspension Administer 4 Drops in left ear two (2) times a day. 7.5 mL 0    hydroCHLOROthiazide (HYDRODIURIL) 12.5 mg tablet Take 1 Tab by mouth daily. 90 Tab 1    Cetirizine 10 mg cap Take 10 mg by mouth daily.  fluticasone propionate (Flonase Allergy Relief) 50 mcg/actuation nasal spray 2 Sprays by Both Nostrils route daily.  aspirin (ASPIRIN) 325 mg tablet Take 325 mg by mouth daily.  simvastatin (ZOCOR) 20 mg tablet 1 tab po HS 90 Tab 1    bisoprolol-hydroCHLOROthiazide (ZIAC) 5-6.25 mg per tablet Take 1 Tab by mouth every morning. 90 Tab 1    calcium carbonate (TUMS) 200 mg calcium (500 mg) chew Take 1 Tab by mouth daily as needed.  valACYclovir (VALTREX) 500 mg tablet Take  by mouth two (2) times daily as needed. No Known Allergies  Past Medical History:   Diagnosis Date    Herpes     High cholesterol     Hypercholesterolemia     Hypertension      No past surgical history on file.   Family History   Problem Relation Age of Onset    No Known Problems Mother     Alcohol abuse Sister     No Known Problems Brother     Cancer Maternal Aunt         bone ca     Social History     Tobacco Use    Smoking status: Current Every Day Smoker     Packs/day: 0.30     Years: 40.00     Pack years: 12.00     Types: Cigarettes    Smokeless tobacco: Never Used   Substance Use Topics    Alcohol use: Yes     Alcohol/week: 0.0 standard drinks     Comment: Socially       Review of Systems   Constitutional: Negative for chills and fever. Respiratory: Negative for cough. Cardiovascular: Negative for chest pain. Gastrointestinal: Negative for nausea and vomiting. Genitourinary: Positive for hematuria. Negative for dysuria and flank pain. Neurological: Negative for headaches. Endo/Heme/Allergies: Does not bruise/bleed easily. Patient-Reported Vitals 6/24/2020   Patient-Reported Weight 197lb   Patient-Reported Height 5f4   Patient-Reported LMP NA        Stantonidris MICHAEL Manfred, who was evaluated through a patient-initiated, synchronous (real-time) audio only encounter, and/or her healthcare decision maker, is aware that it is a billable service, with coverage as determined by her insurance carrier. She provided verbal consent to proceed: Yes. She has not had a related appointment within my department in the past 7 days or scheduled within the next 24 hours.       Total Time: minutes: 21-30 minutes    Jeison Grant NP

## 2020-11-03 NOTE — PROGRESS NOTES
Health Maintenance Due   Topic Date Due    Shingrix Vaccine Age 50> (1 of 2) 11/18/2005    PAP AKA CERVICAL CYTOLOGY  07/02/2017    Pneumococcal 0-64 years (1 of 1 - PPSV23) 07/24/2019       No chief complaint on file. 1. Have you been to the ER, urgent care clinic since your last visit? Hospitalized since your last visit? No    2. Have you seen or consulted any other health care providers outside of the [address] [place] [address] since your last visit? Include any pap smears or colon screening. No    3) Do you have an Advance Directive on file? no    4) Are you interested in receiving information on Advance Directives? NO      Patient is accompanied by self I have received verbal consent from [facility] [address] to discuss any/all medical information while they are present in the room.

## 2020-11-12 ENCOUNTER — OFFICE VISIT (OUTPATIENT)
Dept: INTERNAL MEDICINE CLINIC | Age: 65
End: 2020-11-12
Payer: MEDICARE

## 2020-11-12 ENCOUNTER — HOSPITAL ENCOUNTER (OUTPATIENT)
Dept: LAB | Age: 65
Discharge: HOME | End: 2020-11-12
Payer: COMMERCIAL

## 2020-11-12 VITALS
BODY MASS INDEX: 32.95 KG/M2 | RESPIRATION RATE: 16 BRPM | WEIGHT: 193 LBS | DIASTOLIC BLOOD PRESSURE: 78 MMHG | HEIGHT: 64 IN | TEMPERATURE: 98.3 F | HEART RATE: 64 BPM | OXYGEN SATURATION: 97 % | SYSTOLIC BLOOD PRESSURE: 128 MMHG

## 2020-11-12 DIAGNOSIS — I10 ESSENTIAL HYPERTENSION: ICD-10-CM

## 2020-11-12 DIAGNOSIS — N89.8 VAGINAL DISCHARGE: Primary | ICD-10-CM

## 2020-11-12 DIAGNOSIS — E66.9 OBESITY (BMI 30.0-34.9): ICD-10-CM

## 2020-11-12 DIAGNOSIS — N93.9 SCANT VAGINAL BLEEDING: ICD-10-CM

## 2020-11-12 PROCEDURE — 88142 CYTOPATH C/V THIN LAYER: CPT

## 2020-11-12 PROCEDURE — 99214 OFFICE O/P EST MOD 30 MIN: CPT | Performed by: INTERNAL MEDICINE

## 2020-11-12 RX ORDER — GUAIFENESIN 100 MG/5ML
81 LIQUID (ML) ORAL DAILY
COMMUNITY
End: 2021-06-29

## 2020-11-12 NOTE — PROGRESS NOTES
Edil Seth is a 59 y.o. female  Chief Complaint   Patient presents with    Vaginal Discharge    Well Woman     Health Maintenance Due   Topic Date Due    Shingrix Vaccine Age 49> (1 of 2) 11/18/2005    PAP AKA CERVICAL CYTOLOGY  07/02/2017    Pneumococcal 0-64 years (1 of 1 - PPSV23) 07/24/2019     Visit Vitals  /78 (BP 1 Location: Left arm, BP Patient Position: Sitting)   Pulse 64   Temp 98.3 °F (36.8 °C) (Oral)   Resp 16   Ht 5' 4\" (1.626 m)   Wt 193 lb (87.5 kg)   SpO2 97%   BMI 33.13 kg/m²     1. Have you been to the ER, urgent care clinic since your last visit? Hospitalized since your last visit? No     2. Have you seen or consulted any other health care providers outside of the [address] [place] [address] since your last visit? Include any pap smears or colon screening.  No

## 2020-11-12 NOTE — PROGRESS NOTES
HISTORY OF PRESENT ILLNESS  Alona Pina is a 59 y.o. female. Patient reports that 3 weeks ago, on 26th she had breakthrough (spotting) bleeding as she described as red, but now bright. Reports using a vibrator, 2 times. But reports it was only used externally. States that she began taking a aspirin 81 mg daily, 3 weeks ago. Last period was 10 years ago. Since then has stopped it. She stopped the beet juice and notice that her urine did become clear. Reports today that she has been having discharge. Reports that's he has not had that in years. No odor, no sexual activity. But today reports no discharge or bleeding. Also reports that she has had an increase in stress since her friend passed away in the last few weeks from cervical CA. Denies any pelvic pain, dysuria, CP, fever or SOB  Visit Vitals  /78 (BP 1 Location: Left arm, BP Patient Position: Sitting)   Pulse 64   Temp 98.3 °F (36.8 °C) (Oral)   Resp 16   Ht 5' 4\" (1.626 m)   Wt 193 lb (87.5 kg)   SpO2 97%   BMI 33.13 kg/m²     Past Medical History:   Diagnosis Date    Herpes     High cholesterol     Hypercholesterolemia     Hypertension    History reviewed. No pertinent surgical history. Family History   Problem Relation Age of Onset    No Known Problems Mother     Alcohol abuse Sister     No Known Problems Brother     Cancer Maternal Aunt         bone ca     Outpatient Encounter Medications as of 11/12/2020   Medication Sig Dispense Refill    aspirin 81 mg chewable tablet Take 81 mg by mouth daily.  hydroCHLOROthiazide (HYDRODIURIL) 12.5 mg tablet Take 1 Tab by mouth daily. 90 Tab 1    Cetirizine 10 mg cap Take 10 mg by mouth daily.  fluticasone propionate (Flonase Allergy Relief) 50 mcg/actuation nasal spray 2 Sprays by Both Nostrils route daily.  simvastatin (ZOCOR) 20 mg tablet 1 tab po HS 90 Tab 1    bisoprolol-hydroCHLOROthiazide (ZIAC) 5-6.25 mg per tablet Take 1 Tab by mouth every morning.  90 Tab 1    calcium carbonate (TUMS) 200 mg calcium (500 mg) chew Take 1 Tab by mouth daily as needed.  valACYclovir (VALTREX) 500 mg tablet Take  by mouth two (2) times daily as needed.  ciprofloxacin-dexamethasone (CIPRODEX) 0.3-0.1 % otic suspension Administer 4 Drops in left ear two (2) times a day. 7.5 mL 0    aspirin (ASPIRIN) 325 mg tablet Take 325 mg by mouth daily. No facility-administered encounter medications on file as of 11/12/2020. HPI    Review of Systems   Constitutional: Negative for chills and fever. Respiratory: Negative for cough. Cardiovascular: Negative for chest pain. Gastrointestinal: Negative for abdominal pain and nausea. Genitourinary: Negative for dysuria and hematuria. Scant bleeding and scant discharge    Musculoskeletal: Negative. Neurological: Negative. Physical Exam  Vitals signs and nursing note reviewed. Exam conducted with a chaperone present. Constitutional:       Appearance: She is not ill-appearing. Neck:      Musculoskeletal: Neck supple. Cardiovascular:      Rate and Rhythm: Normal rate. Pulmonary:      Effort: Pulmonary effort is normal.      Breath sounds: Normal breath sounds. Abdominal:      General: Bowel sounds are normal.      Palpations: Abdomen is soft. Tenderness: There is no abdominal tenderness. Genitourinary:     Exam position: Knee-chest position. Labia:         Right: No rash. Left: No rash. Vagina: Normal.      Cervix: No discharge or cervical bleeding. Uterus: Normal.       Rectum: Normal.   Skin:     General: Skin is warm. Neurological:      Mental Status: She is alert and oriented to person, place, and time. Psychiatric:         Mood and Affect: Mood normal.         ASSESSMENT and PLAN  Diagnoses and all orders for this visit:    1. Vaginal discharge  -     PAP, LB, RFX HPV PUIFB([id number]); Future  -     NUSWAB VAGINITIS    2. Scant vaginal bleeding    3.  Essential hypertension    4. Obesity (BMI 30.0-34. 9)      Follow-up and Dispositions    · Return if symptoms worsen or fail to improve.       lab results and schedule of future lab studies reviewed with patient  reviewed medications and side effects in detail

## 2020-11-18 LAB
A VAGINAE DNA VAG QL NAA+PROBE: NORMAL SCORE
BVAB2 DNA VAG QL NAA+PROBE: NORMAL SCORE
C ALBICANS DNA VAG QL NAA+PROBE: NEGATIVE
C GLABRATA DNA VAG QL NAA+PROBE: NEGATIVE
MEGA1 DNA VAG QL NAA+PROBE: NORMAL SCORE
SPECIMEN STATUS REPORT, ROLRST: NORMAL
T VAGINALIS DNA VAG QL NAA+PROBE: NEGATIVE

## 2021-01-05 DIAGNOSIS — I10 ESSENTIAL HYPERTENSION: ICD-10-CM

## 2021-01-05 RX ORDER — BISOPROLOL FUMARATE AND HYDROCHLOROTHIAZIDE 5; 6.25 MG/1; MG/1
TABLET ORAL
Qty: 90 TAB | Refills: 0 | Status: SHIPPED | OUTPATIENT
Start: 2021-01-05 | End: 2021-04-05

## 2021-04-05 DIAGNOSIS — I10 ESSENTIAL HYPERTENSION: ICD-10-CM

## 2021-04-05 RX ORDER — HYDROCHLOROTHIAZIDE 12.5 MG/1
TABLET ORAL
Qty: 30 TAB | Refills: 0 | Status: SHIPPED | OUTPATIENT
Start: 2021-04-05 | End: 2021-04-07

## 2021-04-05 RX ORDER — BISOPROLOL FUMARATE AND HYDROCHLOROTHIAZIDE 5; 6.25 MG/1; MG/1
TABLET ORAL
Qty: 90 TAB | Refills: 0 | Status: SHIPPED | OUTPATIENT
Start: 2021-04-05 | End: 2021-04-07

## 2021-04-14 DIAGNOSIS — I10 ESSENTIAL HYPERTENSION: ICD-10-CM

## 2021-04-14 RX ORDER — BISOPROLOL FUMARATE AND HYDROCHLOROTHIAZIDE 5; 6.25 MG/1; MG/1
TABLET ORAL
Qty: 90 TAB | Refills: 0 | Status: SHIPPED | OUTPATIENT
Start: 2021-04-14 | End: 2021-04-19 | Stop reason: SDUPTHER

## 2021-04-19 ENCOUNTER — OFFICE VISIT (OUTPATIENT)
Dept: INTERNAL MEDICINE CLINIC | Age: 66
End: 2021-04-19
Payer: MEDICARE

## 2021-04-19 ENCOUNTER — HOSPITAL ENCOUNTER (OUTPATIENT)
Dept: LAB | Age: 66
Discharge: HOME | End: 2021-04-19
Payer: MEDICARE

## 2021-04-19 VITALS
SYSTOLIC BLOOD PRESSURE: 160 MMHG | BODY MASS INDEX: 34.56 KG/M2 | OXYGEN SATURATION: 97 % | DIASTOLIC BLOOD PRESSURE: 88 MMHG | HEIGHT: 64 IN | WEIGHT: 202.4 LBS | TEMPERATURE: 97.9 F | RESPIRATION RATE: 18 BRPM | HEART RATE: 71 BPM

## 2021-04-19 DIAGNOSIS — N95.0 POST-MENOPAUSE BLEEDING: ICD-10-CM

## 2021-04-19 DIAGNOSIS — I10 ESSENTIAL HYPERTENSION: Primary | ICD-10-CM

## 2021-04-19 DIAGNOSIS — E55.9 VITAMIN D DEFICIENCY: ICD-10-CM

## 2021-04-19 DIAGNOSIS — E78.2 MIXED HYPERLIPIDEMIA: ICD-10-CM

## 2021-04-19 PROCEDURE — 36415 COLL VENOUS BLD VENIPUNCTURE: CPT

## 2021-04-19 PROCEDURE — 1090F PRES/ABSN URINE INCON ASSESS: CPT | Performed by: INTERNAL MEDICINE

## 2021-04-19 PROCEDURE — 1101F PT FALLS ASSESS-DOCD LE1/YR: CPT | Performed by: INTERNAL MEDICINE

## 2021-04-19 PROCEDURE — 99214 OFFICE O/P EST MOD 30 MIN: CPT | Performed by: INTERNAL MEDICINE

## 2021-04-19 PROCEDURE — 82306 VITAMIN D 25 HYDROXY: CPT

## 2021-04-19 PROCEDURE — G8417 CALC BMI ABV UP PARAM F/U: HCPCS | Performed by: INTERNAL MEDICINE

## 2021-04-19 PROCEDURE — 80053 COMPREHEN METABOLIC PANEL: CPT

## 2021-04-19 PROCEDURE — G8399 PT W/DXA RESULTS DOCUMENT: HCPCS | Performed by: INTERNAL MEDICINE

## 2021-04-19 PROCEDURE — 3017F COLORECTAL CA SCREEN DOC REV: CPT | Performed by: INTERNAL MEDICINE

## 2021-04-19 PROCEDURE — G8753 SYS BP > OR = 140: HCPCS | Performed by: INTERNAL MEDICINE

## 2021-04-19 PROCEDURE — G8536 NO DOC ELDER MAL SCRN: HCPCS | Performed by: INTERNAL MEDICINE

## 2021-04-19 PROCEDURE — G9899 SCRN MAM PERF RSLTS DOC: HCPCS | Performed by: INTERNAL MEDICINE

## 2021-04-19 PROCEDURE — G8427 DOCREV CUR MEDS BY ELIG CLIN: HCPCS | Performed by: INTERNAL MEDICINE

## 2021-04-19 PROCEDURE — G8510 SCR DEP NEG, NO PLAN REQD: HCPCS | Performed by: INTERNAL MEDICINE

## 2021-04-19 PROCEDURE — G8754 DIAS BP LESS 90: HCPCS | Performed by: INTERNAL MEDICINE

## 2021-04-19 RX ORDER — BISOPROLOL FUMARATE AND HYDROCHLOROTHIAZIDE 5; 6.25 MG/1; MG/1
TABLET ORAL
Qty: 90 TAB | Refills: 1 | Status: SHIPPED | OUTPATIENT
Start: 2021-04-19 | End: 2021-06-15 | Stop reason: SDUPTHER

## 2021-04-19 RX ORDER — SIMVASTATIN 20 MG/1
TABLET, FILM COATED ORAL
Qty: 90 TAB | Refills: 1 | Status: SHIPPED | OUTPATIENT
Start: 2021-04-19 | End: 2021-06-07

## 2021-04-19 RX ORDER — HYDROCHLOROTHIAZIDE 25 MG/1
25 TABLET ORAL DAILY
Qty: 30 TAB | Refills: 3 | Status: SHIPPED | OUTPATIENT
Start: 2021-04-19 | End: 2021-06-02 | Stop reason: SDUPTHER

## 2021-04-19 NOTE — PATIENT INSTRUCTIONS
DASH Diet: Care Instructions Your Care Instructions The DASH diet is an eating plan that can help lower your blood pressure. DASH stands for Dietary Approaches to Stop Hypertension. Hypertension is high blood pressure. The DASH diet focuses on eating foods that are high in calcium, potassium, and magnesium. These nutrients can lower blood pressure. The foods that are highest in these nutrients are fruits, vegetables, low-fat dairy products, nuts, seeds, and legumes. But taking calcium, potassium, and magnesium supplements instead of eating foods that are high in those nutrients does not have the same effect. The DASH diet also includes whole grains, fish, and poultry. The DASH diet is one of several lifestyle changes your doctor may recommend to lower your high blood pressure. Your doctor may also want you to decrease the amount of sodium in your diet. Lowering sodium while following the DASH diet can lower blood pressure even further than just the DASH diet alone. Follow-up care is a key part of your treatment and safety. Be sure to make and go to all appointments, and call your doctor if you are having problems. It's also a good idea to know your test results and keep a list of the medicines you take. How can you care for yourself at home? Following the DASH diet · Eat 4 to 5 servings of fruit each day. A serving is 1 medium-sized piece of fruit, ½ cup chopped or canned fruit, 1/4 cup dried fruit, or 4 ounces (½ cup) of fruit juice. Choose fruit more often than fruit juice. · Eat 4 to 5 servings of vegetables each day. A serving is 1 cup of lettuce or raw leafy vegetables, ½ cup of chopped or cooked vegetables, or 4 ounces (½ cup) of vegetable juice. Choose vegetables more often than vegetable juice. · Get 2 to 3 servings of low-fat and fat-free dairy each day. A serving is 8 ounces of milk, 1 cup of yogurt, or 1 ½ ounces of cheese. · Eat 6 to 8 servings of grains each day.  A serving is 1 slice of bread, 1 ounce of dry cereal, or ½ cup of cooked rice, pasta, or cooked cereal. Try to choose whole-grain products as much as possible. · Limit lean meat, poultry, and fish to 2 servings each day. A serving is 3 ounces, about the size of a deck of cards. · Eat 4 to 5 servings of nuts, seeds, and legumes (cooked dried beans, lentils, and split peas) each week. A serving is 1/3 cup of nuts, 2 tablespoons of seeds, or ½ cup of cooked beans or peas. · Limit fats and oils to 2 to 3 servings each day. A serving is 1 teaspoon of vegetable oil or 2 tablespoons of salad dressing. · Limit sweets and added sugars to 5 servings or less a week. A serving is 1 tablespoon jelly or jam, ½ cup sorbet, or 1 cup of lemonade. · Eat less than 2,300 milligrams (mg) of sodium a day. If you limit your sodium to 1,500 mg a day, you can lower your blood pressure even more. · Be aware that all of these are the suggested number of servings for people who eat 1,800 to 2,000 calories a day. Your recommended number of servings may be different if you need more or fewer calories. Tips for success · Start small. Do not try to make dramatic changes to your diet all at once. You might feel that you are missing out on your favorite foods and then be more likely to not follow the plan. Make small changes, and stick with them. Once those changes become habit, add a few more changes. · Try some of the following: ? Make it a goal to eat a fruit or vegetable at every meal and at snacks. This will make it easy to get the recommended amount of fruits and vegetables each day. ? Try yogurt topped with fruit and nuts for a snack or healthy dessert. ? Add lettuce, tomato, cucumber, and onion to sandwiches. ? Combine a ready-made pizza crust with low-fat mozzarella cheese and lots of vegetable toppings. Try using tomatoes, squash, spinach, broccoli, carrots, cauliflower, and onions. ?  Have a variety of cut-up vegetables with a low-fat dip as an appetizer instead of chips and dip. ? Sprinkle sunflower seeds or chopped almonds over salads. Or try adding chopped walnuts or almonds to cooked vegetables. ? Try some vegetarian meals using beans and peas. Add garbanzo or kidney beans to salads. Make burritos and tacos with mashed snow beans or black beans. Where can you learn more? Go to http://www.gray.com/ Enter I262 in the search box to learn more about \"DASH Diet: Care Instructions. \" Current as of: August 31, 2020               Content Version: 12.8 © 8937-8340 [employer]. Care instructions adapted under license by [email or web address] (which disclaims liability or warranty for this information). If you have questions about a medical condition or this instruction, always ask your healthcare professional. [address] any warranty or liability for your use of this information.

## 2021-04-19 NOTE — PROGRESS NOTES
Health Maintenance Due   Topic Date Due    Shingrix Vaccine Age 49> (1 of 2) Never done    Pneumococcal 65+ years (2 of 2 - PPSV23) 11/18/2020    COVID-19 Vaccine (2 - Moderna 2-dose series) 04/19/2021       Chief Complaint   Patient presents with    Hypertension     6 month follow up    Cholesterol Problem    Obesity    Vaginal Bleeding     on and off x 6 months, at times has some cramping       1. Have you been to the ER, urgent care clinic since your last visit? Hospitalized since your last visit? No    2. Have you seen or consulted any other health care providers outside of the [address] [place] [address] since your last visit? Include any pap smears or colon screening. No    3) Do you have an Advance Directive on file? no    4) Are you interested in receiving information on Advance Directives? NO      Patient is accompanied by self I have received verbal consent from [facility] to discuss any/all medical information while they are present in the room.

## 2021-04-19 NOTE — PROGRESS NOTES
HISTORY OF PRESENT ILLNESS  Soraida Etienne is a 72 y.o. female here for follow up. Still having postmenopausal bleeding and spotting. Seen by my nurse practitioner, Pap smear is normal.  Has hypertension, has elevated blood pressure. She is compliant with medication. Denies chest pain palpitation or shortness of breath. Has elevated lipid. on statin. no myalgia. Need lab work. Received Covid vaccine. Mammogram and bone density up-to-date. Hypertension     Obesity    Medication Refill    Cholesterol Problem    Vaginal Bleeding        Review of Systems   Constitutional: Negative. HENT: Negative. Eyes: Negative. Respiratory: Negative. Cardiovascular: Negative. Gastrointestinal: Negative. Genitourinary: Positive for vaginal bleeding. Musculoskeletal: Negative. Negative for falls. Skin: Negative. Neurological: Negative. Endo/Heme/Allergies: Negative. Psychiatric/Behavioral: Negative. Negative for depression and suicidal ideas. Physical Exam  Constitutional:       General: She is not in acute distress. Appearance: Normal appearance. She is well-developed. She is obese. HENT:      Head: Normocephalic and atraumatic. Neck:      Musculoskeletal: Normal range of motion and neck supple. Thyroid: No thyromegaly. Vascular: No JVD. Cardiovascular:      Rate and Rhythm: Normal rate and regular rhythm. Pulses: Normal pulses. Heart sounds: Normal heart sounds. Pulmonary:      Effort: Pulmonary effort is normal. No respiratory distress. Breath sounds: Normal breath sounds. No wheezing. Abdominal:      General: Abdomen is flat. Bowel sounds are normal. There is no distension. Palpations: Abdomen is soft. Tenderness: There is no abdominal tenderness. Comments: Bowel sounds slightly hyperactive. Musculoskeletal: Normal range of motion. Neurological:      Mental Status: She is alert. Motor: No abnormal muscle tone. Psychiatric:         Mood and Affect: Mood normal.         Behavior: Behavior normal.         ASSESSMENT and PLAN  Diagnoses and all orders for this visit:    1. Essential hypertension    Blood pressure. Will increase,  -     hydroCHLOROthiazide (HYDRODIURIL) 25 mg tablet; Take 1 Tab by mouth daily. DC HCTZ 12.5 mg  -     bisoprolol-hydroCHLOROthiazide (ZIAC) 5-6.25 mg per tablet; TAKE 1 TABLET BY MOUTH ONCE DAILY IN THE MORNING  -     METABOLIC PANEL, COMPREHENSIVE; Future  Follow-up in a month. 2. Mixed hyperlipidemia    We will refill,  -     simvastatin (ZOCOR) 20 mg tablet; 1 tab po HS  -     METABOLIC PANEL, COMPREHENSIVE; Future    3. Post-menopause bleeding    Seen by my nurse practitioner, Pap smear done, was negative. She needs to see GYN for endometrial biopsy to rule out endometrial cancer.  -     REFERRAL TO OBSTETRICS AND GYNECOLOGY    4. Vitamin D deficiency    We will repeat,  -     VITAMIN D, 25 HYDROXY; Future        Discussed expected course/resolution/complications of diagnosis in detail with patient. Medication risks/benefits/costs/interactions/alternatives discussed with patient. Pt was given an after visit summary which includes diagnoses, current medications & vitals. Pt expressed understanding with the diagnosis and plan.

## 2021-04-20 LAB
25(OH)D3+25(OH)D2 SERPL-MCNC: 24.3 NG/ML (ref 30–100)
ALBUMIN SERPL-MCNC: 4.4 G/DL (ref 3.8–4.8)
ALBUMIN/GLOB SERPL: 1.7 {RATIO} (ref 1.2–2.2)
ALP SERPL-CCNC: 110 IU/L (ref 39–117)
ALT SERPL-CCNC: 16 IU/L (ref 0–32)
AST SERPL-CCNC: 18 IU/L (ref 0–40)
BILIRUB SERPL-MCNC: 0.3 MG/DL (ref 0–1.2)
BUN SERPL-MCNC: 12 MG/DL (ref 8–27)
BUN/CREAT SERPL: 15 (ref 12–28)
CALCIUM SERPL-MCNC: 10 MG/DL (ref 8.7–10.3)
CHLORIDE SERPL-SCNC: 104 MMOL/L (ref 96–106)
CO2 SERPL-SCNC: 25 MMOL/L (ref 20–29)
CREAT SERPL-MCNC: 0.81 MG/DL (ref 0.57–1)
GLOBULIN SER CALC-MCNC: 2.6 G/DL (ref 1.5–4.5)
GLUCOSE SERPL-MCNC: 127 MG/DL (ref 65–99)
POTASSIUM SERPL-SCNC: 4.4 MMOL/L (ref 3.5–5.2)
PROT SERPL-MCNC: 7 G/DL (ref 6–8.5)
SODIUM SERPL-SCNC: 144 MMOL/L (ref 134–144)

## 2021-04-21 NOTE — PROGRESS NOTES
Blood sugar elevated. Please add HgbA1c. Watch diet for sugars and carbohydrates. Vitamin D level is low. Recommend OTC vitamin D3 1000 units po daily.

## 2021-05-11 ENCOUNTER — HOSPITAL ENCOUNTER (OUTPATIENT)
Dept: LAB | Age: 66
Discharge: HOME | End: 2021-05-11

## 2021-05-11 ENCOUNTER — OFFICE VISIT (OUTPATIENT)
Dept: OBGYN CLINIC | Age: 66
End: 2021-05-11
Payer: MEDICARE

## 2021-05-11 VITALS — DIASTOLIC BLOOD PRESSURE: 72 MMHG | WEIGHT: 200 LBS | SYSTOLIC BLOOD PRESSURE: 126 MMHG | BODY MASS INDEX: 34.33 KG/M2

## 2021-05-11 DIAGNOSIS — N89.8 VAGINAL DISCHARGE: ICD-10-CM

## 2021-05-11 PROCEDURE — G8417 CALC BMI ABV UP PARAM F/U: HCPCS | Performed by: OBSTETRICS & GYNECOLOGY

## 2021-05-11 PROCEDURE — G9899 SCRN MAM PERF RSLTS DOC: HCPCS | Performed by: OBSTETRICS & GYNECOLOGY

## 2021-05-11 PROCEDURE — 99203 OFFICE O/P NEW LOW 30 MIN: CPT | Performed by: OBSTETRICS & GYNECOLOGY

## 2021-05-11 PROCEDURE — 3017F COLORECTAL CA SCREEN DOC REV: CPT | Performed by: OBSTETRICS & GYNECOLOGY

## 2021-05-11 PROCEDURE — G8752 SYS BP LESS 140: HCPCS | Performed by: OBSTETRICS & GYNECOLOGY

## 2021-05-11 PROCEDURE — G8754 DIAS BP LESS 90: HCPCS | Performed by: OBSTETRICS & GYNECOLOGY

## 2021-05-11 PROCEDURE — 1090F PRES/ABSN URINE INCON ASSESS: CPT | Performed by: OBSTETRICS & GYNECOLOGY

## 2021-05-11 PROCEDURE — G8432 DEP SCR NOT DOC, RNG: HCPCS | Performed by: OBSTETRICS & GYNECOLOGY

## 2021-05-11 PROCEDURE — G8399 PT W/DXA RESULTS DOCUMENT: HCPCS | Performed by: OBSTETRICS & GYNECOLOGY

## 2021-05-11 PROCEDURE — G8536 NO DOC ELDER MAL SCRN: HCPCS | Performed by: OBSTETRICS & GYNECOLOGY

## 2021-05-11 PROCEDURE — G8427 DOCREV CUR MEDS BY ELIG CLIN: HCPCS | Performed by: OBSTETRICS & GYNECOLOGY

## 2021-05-11 PROCEDURE — 1101F PT FALLS ASSESS-DOCD LE1/YR: CPT | Performed by: OBSTETRICS & GYNECOLOGY

## 2021-05-11 NOTE — PROGRESS NOTES
Bernard Olivia is a 72 y.o. female who complains of  Postmenopausal bleeding several months ago; seen by PCP with negative pap smear. Has had scant bleeding since then. Recently lost friend to ovarian cyst.  Normal urinary and bowel habits. Known fibroids in the past.      The patient is sexually active. Her relevant past medical history:   Past Medical History:   Diagnosis Date    Herpes     High cholesterol     Hypercholesterolemia     Hypertension         History reviewed. No pertinent surgical history. Social History     Occupational History    Not on file   Tobacco Use    Smoking status: Current Every Day Smoker     Packs/day: 0.30     Years: 40.00     Pack years: 12.00     Types: Cigarettes    Smokeless tobacco: Never Used   Substance and Sexual Activity    Alcohol use: Yes     Alcohol/week: 0.0 standard drinks     Comment: Socially    Drug use: No     Comment: used to smoke pot, had coccaine-long time ago    Sexual activity: Not Currently     Birth control/protection: None     Comment: devorced,2 chilldren,working in [employer]     Family History   Problem Relation Age of Onset    No Known Problems Mother     Alcohol abuse Sister     No Known Problems Brother     Cancer Maternal Aunt         bone ca       No Known Allergies  Prior to Admission medications    Medication Sig Start Date End Date Taking? Authorizing Provider   hydroCHLOROthiazide (HYDRODIURIL) 25 mg tablet Take 1 Tab by mouth daily. DC HCTZ 12.5 mg 4/19/21  Yes Boy Luz MD   bisoprolol-hydroCHLOROthiazide [facility]) 5-6.25 mg per tablet TAKE 1 TABLET BY MOUTH ONCE DAILY IN THE MORNING 4/19/21  Yes Boy Luz MD   simvastatin (ZOCOR) 20 mg tablet 1 tab po HS 4/19/21  Yes Boy Luz MD   aspirin 81 mg chewable tablet Take 81 mg by mouth daily. Yes Provider, Historical   Cetirizine 10 mg cap Take 10 mg by mouth daily.    Yes Provider, Historical   fluticasone propionate (Flonase Allergy Relief) 50 mcg/actuation nasal spray 2 Sprays by Both Nostrils route daily. Yes Provider, Historical   calcium carbonate (TUMS) 200 mg calcium (500 mg) chew Take 1 Tab by mouth daily as needed. Yes Provider, Historical   valACYclovir (VALTREX) 500 mg tablet Take  by mouth two (2) times daily as needed.     Provider, Historical        Review of Systems - History obtained from the patient  Constitutional: negative for weight loss, fever, night sweats  HEENT: negative for hearing loss, earache, congestion, snoring, sorethroat  CV: negative for chest pain, palpitations, edema  Resp: negative for cough, shortness of breath, wheezing  Breast: negative for breast lumps, nipple discharge, galactorrhea  GI: negative for change in bowel habits, abdominal pain, black or bloody stools  : negative for frequency, dysuria, hematuria  MSK: negative for back pain, joint pain, muscle pain  Skin: negative for itching, rash, hives  Neuro: negative for dizziness, headache, confusion, weakness  Psych: negative for anxiety, depression, change in mood  Heme/lymph: negative for bleeding, bruising, pallor      Objective:  Visit Vitals  /72   Wt 200 lb (90.7 kg)   LMP 10/26/2020   BMI 34.33 kg/m²          PHYSICAL EXAMINATION    Constitutional  · Appearance: well-nourished, well developed, alert, in no acute distress    HENT  · Head and Face: appears normal    Neck  · Inspection/Palpation: normal appearance, no masses or tenderness  · Lymph Nodes: no lymphadenopathy present  · Thyroid: gland size normal, nontender, no nodules or masses present on palpation  ·   Breasts  · Inspection of Breasts: breasts symmetrical, no skin changes, no discharge present, nipple appearance normal, no skin retraction present  · Palpation of Breasts and Axillae: no masses present on palpation, no breast tenderness  · Axillary Lymph Nodes: no lymphadenopathy present    Gastrointestinal  · Abdominal Examination: abdomen non-tender to palpation, normal bowel sounds, no masses present  · Liver and spleen: no hepatomegaly present, spleen not palpable  · Hernias: no hernias identified    Genitourinary  · External Genitalia: normal appearance for age, no discharge present, no tenderness present, no inflammatory lesions present, no masses present, no atrophy present  · Vagina: normal vaginal vault without central or paravaginal defects, no discharge present, no inflammatory lesions present, no masses present  · Bladder: non-tender to palpation  · Urethra: appears normal  · Cervix: normal   · Uterus: normal size, shape and consistency  · Adnexa: no adnexal tenderness present, no adnexal masses present  · Perineum: perineum within normal limits, no evidence of trauma, no rashes or skin lesions present  · Anus: anus within normal limits, no hemorrhoids present  · Inguinal Lymph Nodes: no lymphadenopathy present    Skin  · General Inspection: no rash, no lesions identified    Neurologic/Psychiatric  · Mental Status:  · Orientation: grossly oriented to person, place and time  · Mood and Affect: mood normal, affect appropriate  Procedure note: Endometrial biopsy    Stantonidris Shearer is a No obstetric history on file. ,  72 y.o. female BLACK/ Patient's last menstrual period was 10/26/2020. The patient has a history of The encounter diagnosis was Abnormal uterine bleeding, postpartum. and presents for an endometrial biopsy. Indications:   After the indications, risks, benefits, and alternatives to performing an endometrial biopsy were explained to the patient, her questions were answered and informed consent was obtained. Procedure: The patient was placed on the table in the dorsal lithotomy position. A bimanual exam showed the uterus to be anterior. The uterus was mildly enlarged. A speculum was placed in the vagina. The cervix was visualized and prepped with betadine. An Allis tenaculum was   placed on the anterior lip of the cervix for traction.  It was  necessary to dilate the cervix. A pipelle was passed through the endocervical canal without difficulty. The uterus was sounded to 8 cm's. A scant amount of tissue was returned. This tissue was placed in formalin and sent to pathology. It was felt that an adequate sample was obtained. The patient tolerated the procedure well and she reported mild cramping. The tenaculum and speculum were removed. Post Procedural Status: The patient was observed for 2 minutes after the procedure. She had mild cramping at the time of discharge. There were no complications. The patient was discharged in stable condition. Assessment:    postmenopausal bleeding  Hx fibroids    Plan:   Situation reviewed w patient; will notify of pipelle; anticipate reassuring results  If bleeding persists, US  Patient declines presence of chaperone during today's visit.

## 2021-05-17 DIAGNOSIS — N93.9 ABNORMAL UTERINE BLEEDING (AUB): Primary | ICD-10-CM

## 2021-05-19 ENCOUNTER — VIRTUAL VISIT (OUTPATIENT)
Dept: INTERNAL MEDICINE CLINIC | Age: 66
End: 2021-05-19
Payer: MEDICARE

## 2021-05-19 DIAGNOSIS — E78.2 MIXED HYPERLIPIDEMIA: ICD-10-CM

## 2021-05-19 DIAGNOSIS — E66.9 OBESITY (BMI 30.0-34.9): ICD-10-CM

## 2021-05-19 DIAGNOSIS — I10 ESSENTIAL HYPERTENSION: Primary | ICD-10-CM

## 2021-05-19 DIAGNOSIS — N85.00 ENDOMETRIAL HYPERPLASIA: ICD-10-CM

## 2021-05-19 PROCEDURE — G8756 NO BP MEASURE DOC: HCPCS | Performed by: INTERNAL MEDICINE

## 2021-05-19 PROCEDURE — G8417 CALC BMI ABV UP PARAM F/U: HCPCS | Performed by: INTERNAL MEDICINE

## 2021-05-19 PROCEDURE — 3017F COLORECTAL CA SCREEN DOC REV: CPT | Performed by: INTERNAL MEDICINE

## 2021-05-19 PROCEDURE — 99214 OFFICE O/P EST MOD 30 MIN: CPT | Performed by: INTERNAL MEDICINE

## 2021-05-19 PROCEDURE — G0463 HOSPITAL OUTPT CLINIC VISIT: HCPCS | Performed by: INTERNAL MEDICINE

## 2021-05-19 PROCEDURE — G8536 NO DOC ELDER MAL SCRN: HCPCS | Performed by: INTERNAL MEDICINE

## 2021-05-19 PROCEDURE — G9899 SCRN MAM PERF RSLTS DOC: HCPCS | Performed by: INTERNAL MEDICINE

## 2021-05-19 PROCEDURE — G8399 PT W/DXA RESULTS DOCUMENT: HCPCS | Performed by: INTERNAL MEDICINE

## 2021-05-19 PROCEDURE — G8427 DOCREV CUR MEDS BY ELIG CLIN: HCPCS | Performed by: INTERNAL MEDICINE

## 2021-05-19 PROCEDURE — G8510 SCR DEP NEG, NO PLAN REQD: HCPCS | Performed by: INTERNAL MEDICINE

## 2021-05-19 PROCEDURE — 1101F PT FALLS ASSESS-DOCD LE1/YR: CPT | Performed by: INTERNAL MEDICINE

## 2021-05-19 PROCEDURE — 1090F PRES/ABSN URINE INCON ASSESS: CPT | Performed by: INTERNAL MEDICINE

## 2021-05-19 NOTE — PATIENT INSTRUCTIONS
Medicare Wellness Visit, Female The best way to live healthy is to have a lifestyle where you eat a well-balanced diet, exercise regularly, limit alcohol use, and quit all forms of tobacco/nicotine, if applicable. Regular preventive services are another way to keep healthy. Preventive services (vaccines, screening tests, monitoring & exams) can help personalize your care plan, which helps you manage your own care. Screening tests can find health problems at the earliest stages, when they are easiest to treat. [facility] follows the current, evidence-based guidelines published by the [facility] ([facility]) when recommending preventive services for our patients. Because we follow these guidelines, sometimes recommendations change over time as research supports it. (For example, mammograms used to be recommended annually. Even though Medicare will still pay for an annual mammogram, the newer guidelines recommend a mammogram every two years for women of average risk). Of course, you and your doctor may decide to screen more often for some diseases, based on your risk and your co-morbidities (chronic disease you are already diagnosed with). Preventive services for you include: - Medicare offers their members a free annual wellness visit, which is time for you and your primary care provider to discuss and plan for your preventive service needs. Take advantage of this benefit every year! 
-All adults over the age of 72 should receive the recommended pneumonia vaccines. Current USPSTF guidelines recommend a series of two vaccines for the best pneumonia protection.  
-All adults should have a flu vaccine yearly and a tetanus vaccine every 10 years.  
-All adults age 48 and older should receive the shingles vaccines (series of two vaccines).      
-All adults age 38-68 who are overweight should have a diabetes screening test once every three years.  
-All adults born between [birth year] and 1965 should be screened once for Hepatitis C. 
-Other screening tests and preventive services for persons with diabetes include: an eye exam to screen for diabetic retinopathy, a kidney function test, a foot exam, and stricter control over your cholesterol.  
-Cardiovascular screening for adults with routine risk involves an electrocardiogram (ECG) at intervals determined by your doctor.  
-Colorectal cancer screenings should be done for adults age 54-65 with no increased risk factors for colorectal cancer. There are a number of acceptable methods of screening for this type of cancer. Each test has its own benefits and drawbacks. Discuss with your doctor what is most appropriate for you during your annual wellness visit. The different tests include: colonoscopy (considered the best screening method), a fecal occult blood test, a fecal DNA test, and sigmoidoscopy. 
 
-A bone mass density test is recommended when a woman turns 65 to screen for osteoporosis. This test is only recommended one time, as a screening. Some providers will use this same test as a disease monitoring tool if you already have osteoporosis. -Breast cancer screenings are recommended every other year for women of normal risk, age 54-69. 
-Cervical cancer screenings for women over age 72 are only recommended with certain risk factors. Here is a list of your current Health Maintenance items (your personalized list of preventive services) with a due date: 
Health Maintenance Due Topic Date Due  Shingles Vaccine (1 of 2) Never done  Pneumococcal Vaccine (2 of 2 - PPSV23) 11/18/2020

## 2021-05-19 NOTE — PROGRESS NOTES
[address] [facility] Lenin is a 72 y.o. female who was seen by synchronous (real-time) audio-video technology on 5/19/2021 for Depression, Labs, and Abnormal Lab Results        Assessment & Plan:   Diagnoses and all orders for this visit:    1. Essential hypertension  Stable blood pressure. On Ziac and hydrochlorothiazide. Labs are stable. 2. Endometrial hyperplasia  Recent biopsy showed endometrial hyperplasia and atypical cells. She is scheduled to see GYN oncologist Dr. Geovanna Kang to have a hysterectomy. She is worried about it. I have discussed her biopsy results with her and reassured her. I have advised her to get JUAN . 3. Obesity (BMI 30.0-34. 9)  Addressed weight, diet and exercise with patient. Decrease carbohydrates (white foods, sweet foods, sweet drinks and alcohol), increase green leafy vegetables and protein (lean meats and beans) with each meal. Avoid fried foods. Eat 3-5 small meals daily. Do not skip meals. Increase water intake. Increase physical activity to 30 minutes daily for health benefit or 60 minutes daily to prevent weight regain, as tolerated. Get 7-8 hours uninterrupted sleep nightly. 4. Mixed hyperlipidemia  Stable lipid. On Zocor. I spent at least 30 minutes on this visit with this established patient. Subjective:     Ms. Barbra Mcclendon is here for follow-up. She recently had seen gynecologist Dr. Iván Becker and had endometrial biopsy done, showed endometrial hyperplasia and atypical cells. She is worried about it. She is thinking that she might have an advanced cancer. She is already referred to GYN oncologist.  She did not see him yet. Meanwhile she is feeling good. Mild spotting. No other discomfort. Has hypertension, compliant with medicine. Denies chest pain palpitation or shortness of breath. Has elevated lipids, on statin. Doing well. Received Covid vaccine. Is staying home. Not exposed to Covid.     Prior to Admission medications    Medication Sig Start Date End Date Taking? Authorizing Provider   hydroCHLOROthiazide (HYDRODIURIL) 25 mg tablet Take 1 Tab by mouth daily. DC HCTZ 12.5 mg 4/19/21  Yes Helena Sands MD   bisoprolol-hydroCHLOROthiazide [facility]) 5-6.25 mg per tablet TAKE 1 TABLET BY MOUTH ONCE DAILY IN THE MORNING 4/19/21  Yes Helena Sands MD   simvastatin (ZOCOR) 20 mg tablet 1 tab po HS 4/19/21  Yes Helena Sands MD   aspirin 81 mg chewable tablet Take 81 mg by mouth daily. Yes Provider, Historical   Cetirizine 10 mg cap Take 10 mg by mouth daily. Yes Provider, Historical   fluticasone propionate (Flonase Allergy Relief) 50 mcg/actuation nasal spray 2 Sprays by Both Nostrils route daily. Yes Provider, Historical   calcium carbonate (TUMS) 200 mg calcium (500 mg) chew Take 1 Tab by mouth daily as needed. Yes Provider, Historical   valACYclovir (VALTREX) 500 mg tablet Take  by mouth two (2) times daily as needed.    Yes Provider, Historical     Past Medical History:   Diagnosis Date    Herpes     High cholesterol     Hypercholesterolemia     Hypertension        ROS    Objective:     Patient-Reported Vitals 5/19/2021   Patient-Reported Weight 200 lbs   Patient-Reported Height -   Patient-Reported Pulse 67   Patient-Reported Systolic  075   Patient-Reported Diastolic 72   Patient-Reported LMP currently            Constitutional: [x] Appears well-developed and well-nourished [x] No apparent distress      [] Abnormal -     Mental status: [x] Alert and awake  [x] Oriented to person/place/time [x] Able to follow commands    [] Abnormal -     Eyes:   EOM    [x]  Normal    [] Abnormal -   Sclera  [x]  Normal    [] Abnormal -          Discharge [x]  None visible   [] Abnormal -     HENT: [x] Normocephalic, atraumatic  [] Abnormal -   [x] Mouth/Throat: Mucous membranes are moist    External Ears [x] Normal  [] Abnormal -    Neck: [x] No visualized mass [] Abnormal -     Pulmonary/Chest: [x] Respiratory effort normal   [x] No visualized signs of difficulty breathing or respiratory distress        [] Abnormal -      Musculoskeletal:   [x] Normal gait with no signs of ataxia         [x] Normal range of motion of neck        [] Abnormal -     Neurological:        [x] No Facial Asymmetry (Cranial nerve 7 motor function) (limited exam due to video visit)          [x] No gaze palsy        [] Abnormal -          Skin:        [x] No significant exanthematous lesions or discoloration noted on facial skin         [] Abnormal -            Psychiatric:       [x] Normal Affect [] Abnormal -        [x] No Hallucinations    Other pertinent observable physical exam findings:-        We discussed the expected course, resolution and complications of the diagnosis(es) in detail. Medication risks, benefits, costs, interactions, and alternatives were discussed as indicated. I advised her to contact the office if her condition worsens, changes or fails to improve as anticipated. She expressed understanding with the diagnosis(es) and plan. Angie Shah, was evaluated through a synchronous (real-time) audio-video encounter. The patient (or guardian if applicable) is aware that this is a billable service. Verbal consent to proceed has been obtained within the past 12 months. The visit was conducted pursuant to the emergency declaration under the [facility], [address] [place] [address] authority and the [employer] and [employer] General Act. Patient identification was verified, and a caregiver was present when appropriate. The patient was located in a state where the provider was credentialed to provide care.       Yaa Singer MD

## 2021-05-19 NOTE — PROGRESS NOTES
Health Maintenance Due   Topic Date Due    Shingrix Vaccine Age 49> (1 of 2) Never done    Pneumococcal 65+ years (2 of 2 - PPSV23) 11/18/2020    Medicare Yearly Exam  Never done       Chief Complaint   Patient presents with    Depression    Labs       1. Have you been to the ER, urgent care clinic since your last visit? Hospitalized since your last visit? No    2. Have you seen or consulted any other health care providers outside of the [address] [place] [address] since your last visit? Include any pap smears or colon screening. No    3) Do you have an Advance Directive on file? no    4) Are you interested in receiving information on Advance Directives? NO      Patient is accompanied by self I have received verbal consent from [address] [facility] to discuss any/all medical information while they are present in the room.

## 2021-05-26 ENCOUNTER — OFFICE VISIT (OUTPATIENT)
Dept: GYNECOLOGY | Age: 66
End: 2021-05-26
Payer: MEDICARE

## 2021-05-26 VITALS
BODY MASS INDEX: 34.25 KG/M2 | DIASTOLIC BLOOD PRESSURE: 77 MMHG | HEART RATE: 68 BPM | SYSTOLIC BLOOD PRESSURE: 159 MMHG | HEIGHT: 64 IN | WEIGHT: 200.6 LBS

## 2021-05-26 DIAGNOSIS — N85.02 EIN (ENDOMETRIAL INTRAEPITHELIAL NEOPLASIA): Primary | ICD-10-CM

## 2021-05-26 DIAGNOSIS — E66.9 OBESITY (BMI 30.0-34.9): ICD-10-CM

## 2021-05-26 DIAGNOSIS — I10 ESSENTIAL HYPERTENSION: ICD-10-CM

## 2021-05-26 PROCEDURE — 1090F PRES/ABSN URINE INCON ASSESS: CPT | Performed by: OBSTETRICS & GYNECOLOGY

## 2021-05-26 PROCEDURE — 3017F COLORECTAL CA SCREEN DOC REV: CPT | Performed by: OBSTETRICS & GYNECOLOGY

## 2021-05-26 PROCEDURE — G8753 SYS BP > OR = 140: HCPCS | Performed by: OBSTETRICS & GYNECOLOGY

## 2021-05-26 PROCEDURE — G9899 SCRN MAM PERF RSLTS DOC: HCPCS | Performed by: OBSTETRICS & GYNECOLOGY

## 2021-05-26 PROCEDURE — 99205 OFFICE O/P NEW HI 60 MIN: CPT | Performed by: OBSTETRICS & GYNECOLOGY

## 2021-05-26 PROCEDURE — G8427 DOCREV CUR MEDS BY ELIG CLIN: HCPCS | Performed by: OBSTETRICS & GYNECOLOGY

## 2021-05-26 PROCEDURE — G8399 PT W/DXA RESULTS DOCUMENT: HCPCS | Performed by: OBSTETRICS & GYNECOLOGY

## 2021-05-26 PROCEDURE — G8754 DIAS BP LESS 90: HCPCS | Performed by: OBSTETRICS & GYNECOLOGY

## 2021-05-26 PROCEDURE — G8417 CALC BMI ABV UP PARAM F/U: HCPCS | Performed by: OBSTETRICS & GYNECOLOGY

## 2021-05-26 PROCEDURE — 1101F PT FALLS ASSESS-DOCD LE1/YR: CPT | Performed by: OBSTETRICS & GYNECOLOGY

## 2021-05-26 PROCEDURE — G8536 NO DOC ELDER MAL SCRN: HCPCS | Performed by: OBSTETRICS & GYNECOLOGY

## 2021-05-26 PROCEDURE — G8432 DEP SCR NOT DOC, RNG: HCPCS | Performed by: OBSTETRICS & GYNECOLOGY

## 2021-05-26 NOTE — PROGRESS NOTES
[address] [place] [address] Mathias Moritz [address], [address] [place] [address]  P [phone]  F [phone]    Office Note  Patient ID:  Name:  Aleja Dumont  MRN:  [id number]  :  1955/65 y.o. Date:  2021      HISTORY OF PRESENT ILLNESS:  Ms. Aleja Dumont is a 72 y.o. postmenopausal female who presents as a new patient from Dr. Johana Serrano for complex endometrial hyperplasia with atypia. The patient reports life stressors last summer, and during all of that she reports having a light period in 2020. She presented to her PCPs office in 2020 per patient and reported that she had a normal pap smear and no further workup was done. She later presented in 2021 to her PCP and saw her doctor rather than the NP who recommended a referral to an [place]. She ultimately saw Dr. Johana Serrano and underwent an endometrial biopsy on 2021 consistent with complex endometrial hyperplasia with atypia. Reports very minimal spotting now. Denies pelvic or abdominal pain/bloating. Denies CP or SOB. Denies nausea or vomiting. Denies change in appetite or bowel habits. Pertinent PMH/PSH: Smoker, HTN, HPL      Active, no restrictions. Pathology Review:  2021:  FINAL PATHOLOGIC DIAGNOSIS   Endometrium, biopsy:   Complex endometrial hyperplasia with architectural atypia, 2 mm fragment (limited tissue)       ROS:  A comprehensive review of systems was negative except for that written in the History of Present Illness. , 10 point ROS    OB/GYN ROS:  Per HPI    ECOG ndGndrndanddndend:nd nd2nd Problem List:  Patient Active Problem List    Diagnosis Date Noted    Vitamin D deficiency 2018    Smoking 2018    Obesity (BMI 30.0-34.9) 08/10/2016    HTN (hypertension) 2012    Mixed hyperlipidemia 2012     PMH:  Past Medical History:   Diagnosis Date    Herpes     High cholesterol     Hypercholesterolemia     Hypertension       PSH:  History reviewed.  No pertinent surgical history. Social History:  Social History     Tobacco Use    Smoking status: Current Every Day Smoker     Packs/day: 0.30     Years: 40.00     Pack years: 12.00     Types: Cigarettes    Smokeless tobacco: Never Used   Substance Use Topics    Alcohol use: Yes     Alcohol/week: 0.0 standard drinks     Comment: Socially      Family History:  Family History   Problem Relation Age of Onset    No Known Problems Mother     Alcohol abuse Sister     No Known Problems Brother     Cancer Maternal Aunt         bone ca      Medications: (reviewed)  Current Outpatient Medications   Medication Sig    hydroCHLOROthiazide (HYDRODIURIL) 25 mg tablet Take 1 Tab by mouth daily. DC HCTZ 12.5 mg    bisoprolol-hydroCHLOROthiazide (ZIAC) 5-6.25 mg per tablet TAKE 1 TABLET BY MOUTH ONCE DAILY IN THE MORNING    simvastatin (ZOCOR) 20 mg tablet 1 tab po HS    Cetirizine 10 mg cap Take 10 mg by mouth daily.  fluticasone propionate (Flonase Allergy Relief) 50 mcg/actuation nasal spray 2 Sprays by Both Nostrils route daily.  calcium carbonate (TUMS) 200 mg calcium (500 mg) chew Take 1 Tab by mouth daily as needed.  valACYclovir (VALTREX) 500 mg tablet Take  by mouth two (2) times daily as needed.  aspirin 81 mg chewable tablet Take 81 mg by mouth daily. (Patient not taking: Reported on 5/26/2021)     No current facility-administered medications for this visit. Allergies: (reviewed)  No Known Allergies       OBJECTIVE:    Physical Exam:  VITAL SIGNS: Vitals:    05/26/21 1334   BP: (!) 159/77   Pulse: 68   Weight: 200 lb 9.6 oz (91 kg)   Height: 5' 4\" (1.626 m)     Body mass index is 34.43 kg/m². GENERAL YRN: Conversant, alert, oriented. No acute distress. HEENT: HEENT. No thyroid enlargement. No JVD. Neck: Supple without restrictions. RESPIRATORY: Clear to auscultation and percussion to the bases. No CVAT. CARDIOVASC: RRR without murmur/rub.    GASTROINT: soft, non-tender, without masses or organomegaly MUSCULOSKEL: no joint tenderness, deformity or swelling       EXTREMITIES: extremities normal, atraumatic, no cyanosis or edema   PELVIC: Exam chaperoned by nurse. Normal appearing external genitalia. On speculum exam, normal appearing vagina and cervix. On bimanual exam, the cervix and uterus are normal size and mobile. No evidence of adnexal masses or nodularity. RECTAL: deferred   CAROLE SURVEY: No suspicious lymphadenopathy or edema noted. NEURO: Grossly intact. No acute deficit. Lab Date as available:    Lab Results   Component Value Date/Time    WBC 5.0 06/30/2020 07:10 AM    HGB 13.3 06/30/2020 07:10 AM    HCT 40.7 06/30/2020 07:10 AM    PLATELET 306 54/67/2864 07:10 AM    MCV 96 06/30/2020 07:10 AM     Lab Results   Component Value Date/Time    Sodium 144 04/19/2021 09:09 AM    Potassium 4.4 04/19/2021 09:09 AM    Chloride 104 04/19/2021 09:09 AM    CO2 25 04/19/2021 09:09 AM    Anion gap 7 05/02/2018 03:26 PM    Glucose 127 (H) 04/19/2021 09:09 AM    BUN 12 04/19/2021 09:09 AM    Creatinine 0.81 04/19/2021 09:09 AM    BUN/Creatinine ratio 15 04/19/2021 09:09 AM    GFR est AA 88 04/19/2021 09:09 AM    GFR est non-AA 76 04/19/2021 09:09 AM    Calcium 10.0 04/19/2021 09:09 AM         IMPRESSION/PLAN:    Ms. Steffen Alberto is a 72 y.o. female with a working diagnosis of complex endometrial hyperplasia with atpyia    Problems:     Patient Active Problem List    Diagnosis Date Noted    Vitamin D deficiency 04/17/2018    Smoking 04/17/2018    Obesity (BMI 30.0-34.9) 08/10/2016    HTN (hypertension) 12/17/2012    Mixed hyperlipidemia 12/17/2012       I reviewed Ms. Orestes Shearer's course to date, including her medical records, recent studies, physical exam, and review of symptoms. Counseled patient regarding standard of care recommendations for complex endometrial hyperplasia with atypia, including hysterectomy.  Discussed the natural history of endometrial hyperplasia and the role of estrogen in this process. Also counseled patient regarding a 20-40% underlying risk of an overt endometrial cancer. Therefore, I have recommended proceeding with sentinel lymph node mapping and definitive hysterectomy with frozen section at the time of surgery. If there is an endometrial cancer present on frozen pathology, will plan to proceed with sentinel lymph node dissection and surgical staging as indicated. Given the patient's age, I have also recommended removal of both tubes and ovaries regardless of the frozen pathology. Plan for TLH/BSO with sentinel lymph node mapping, followed by possible SLND, possible pelvic and/or para-aortic LND, and possible exploratory laparotomy. Posted for surgery on 7/8/2021. Plan for CBCD, CMP, HbA1c, EKG, and CXR prior to surgery. Counseled patient regarding risks, benefits, indications, and alternatives to surgery. Plan to sign consents day of surgery. All questions and concerns were addressed with the patient and she is comfortable with the plan.     Defined Sensitive Document    >50% of total time allocated to visit dedicated to counseling, 60 minutes total.    Signed By: Johnathon Presley MD     5/31/2021/8:09 AM

## 2021-05-26 NOTE — PROGRESS NOTES
New Patient, Referred by Dr. Jody Izquierdo for Complex endometrial hyperplasia with architectural atypia , Endo biopsy was done on 5/11/2021    1. Have you been to the ER, urgent care clinic since your last visit? Hospitalized since your last visit?  no    2. Have you seen or consulted any other health care providers outside of the [address] [place] [address] since your last visit? Include any pap smears or colon screening.    no

## 2021-05-26 NOTE — LETTER
5/31/2021 Patient: Raghavendra Meng YOB: 1955 Date of Visit: 5/26/2021 Rubi Smith MD 
[address] [facility] [address] [address] [address] [employer] Christiano Fernández MD 
[facility] [address] [address] [address] [employer] Dear MD Christiano Nicholas MD, Thank you [facility]. My notes for this consultation are attached. If you have questions, please do not hesitate to call me. I look forward to following your patient along with you.  
 
 
Sincerely, 
 
Jo Thompson MD

## 2021-05-31 PROBLEM — N85.02 EIN (ENDOMETRIAL INTRAEPITHELIAL NEOPLASIA): Status: ACTIVE | Noted: 2021-05-31

## 2021-06-02 DIAGNOSIS — I10 ESSENTIAL HYPERTENSION: ICD-10-CM

## 2021-06-02 RX ORDER — HYDROCHLOROTHIAZIDE 25 MG/1
25 TABLET ORAL DAILY
Qty: 90 TABLET | Refills: 1 | Status: SHIPPED | OUTPATIENT
Start: 2021-06-02 | End: 2021-10-07

## 2021-06-07 DIAGNOSIS — E78.2 MIXED HYPERLIPIDEMIA: ICD-10-CM

## 2021-06-07 RX ORDER — SIMVASTATIN 20 MG/1
TABLET, FILM COATED ORAL
Qty: 90 TABLET | Refills: 0 | Status: SHIPPED | OUTPATIENT
Start: 2021-06-07 | End: 2021-06-10 | Stop reason: SDUPTHER

## 2021-06-10 DIAGNOSIS — E78.2 MIXED HYPERLIPIDEMIA: ICD-10-CM

## 2021-06-10 RX ORDER — SIMVASTATIN 20 MG/1
TABLET, FILM COATED ORAL
Qty: 90 TABLET | Refills: 0 | Status: SHIPPED | OUTPATIENT
Start: 2021-06-10 | End: 2021-08-11

## 2021-06-10 NOTE — TELEPHONE ENCOUNTER
Requested Prescriptions     Pending Prescriptions Disp Refills    simvastatin (ZOCOR) 20 mg tablet 90 Tablet 0     Sig: Take 1 tablet by mouth nightly     05/19/2021  11/15/2021  [facility]

## 2021-06-15 DIAGNOSIS — I10 ESSENTIAL HYPERTENSION: ICD-10-CM

## 2021-06-15 RX ORDER — BISOPROLOL FUMARATE AND HYDROCHLOROTHIAZIDE 5; 6.25 MG/1; MG/1
TABLET ORAL
Qty: 90 TABLET | Refills: 1 | Status: SHIPPED | OUTPATIENT
Start: 2021-06-15 | End: 2021-09-21 | Stop reason: SDUPTHER

## 2021-06-28 NOTE — PERIOP NOTES
PER TIMING NOTE FROM KENNEDY WHITFIELD, :  PF FULLY VACCINATED MODERNA 3-22-21 AND 4-23-21/PT INSTRUCTED TO BRING MODERNA CARD TO PAT APPT

## 2021-06-29 ENCOUNTER — HOSPITAL ENCOUNTER (OUTPATIENT)
Dept: GENERAL RADIOLOGY | Age: 66
Discharge: HOME | End: 2021-06-29
Attending: OBSTETRICS & GYNECOLOGY
Payer: MEDICARE

## 2021-06-29 ENCOUNTER — HOSPITAL ENCOUNTER (OUTPATIENT)
Dept: PREADMISSION TESTING | Age: 66
Discharge: HOME | End: 2021-06-29
Payer: MEDICARE

## 2021-06-29 VITALS
HEART RATE: 70 BPM | HEIGHT: 65 IN | DIASTOLIC BLOOD PRESSURE: 83 MMHG | SYSTOLIC BLOOD PRESSURE: 149 MMHG | BODY MASS INDEX: 33.68 KG/M2 | WEIGHT: 202.16 LBS | RESPIRATION RATE: 16 BRPM | TEMPERATURE: 97.8 F

## 2021-06-29 LAB
ALBUMIN SERPL-MCNC: 3.8 G/DL (ref 3.5–5)
ALBUMIN/GLOB SERPL: 1.1 {RATIO} (ref 1.1–2.2)
ALP SERPL-CCNC: 119 U/L (ref 45–117)
ALT SERPL-CCNC: 24 U/L (ref 12–78)
ANION GAP SERPL CALC-SCNC: 6 MMOL/L (ref 5–15)
AST SERPL-CCNC: 19 U/L (ref 15–37)
BASOPHILS # BLD: 0 K/UL (ref 0–0.1)
BASOPHILS NFR BLD: 0 % (ref 0–1)
BILIRUB SERPL-MCNC: 0.4 MG/DL (ref 0.2–1)
BUN SERPL-MCNC: 10 MG/DL (ref 6–20)
BUN/CREAT SERPL: 18 (ref 12–20)
CALCIUM SERPL-MCNC: 9.6 MG/DL (ref 8.5–10.1)
CHLORIDE SERPL-SCNC: 101 MMOL/L (ref 97–108)
CO2 SERPL-SCNC: 30 MMOL/L (ref 21–32)
CREAT SERPL-MCNC: 0.56 MG/DL (ref 0.55–1.02)
DIFFERENTIAL METHOD BLD: NORMAL
EOSINOPHIL # BLD: 0.1 K/UL (ref 0–0.4)
EOSINOPHIL NFR BLD: 3 % (ref 0–7)
ERYTHROCYTE [DISTWIDTH] IN BLOOD BY AUTOMATED COUNT: 12.8 % (ref 11.5–14.5)
EST. AVERAGE GLUCOSE BLD GHB EST-MCNC: 117 MG/DL
GLOBULIN SER CALC-MCNC: 3.4 G/DL (ref 2–4)
GLUCOSE SERPL-MCNC: 129 MG/DL (ref 65–100)
HBA1C MFR BLD: 5.7 % (ref 4–5.6)
HCT VFR BLD AUTO: 38.2 % (ref 35–47)
HGB BLD-MCNC: 12.8 G/DL (ref 11.5–16)
IMM GRANULOCYTES # BLD AUTO: 0 K/UL (ref 0–0.04)
IMM GRANULOCYTES NFR BLD AUTO: 0 % (ref 0–0.5)
LYMPHOCYTES # BLD: 1.2 K/UL (ref 0.8–3.5)
LYMPHOCYTES NFR BLD: 30 % (ref 12–49)
MCH RBC QN AUTO: 31.5 PG (ref 26–34)
MCHC RBC AUTO-ENTMCNC: 33.5 G/DL (ref 30–36.5)
MCV RBC AUTO: 94.1 FL (ref 80–99)
MONOCYTES # BLD: 0.3 K/UL (ref 0–1)
MONOCYTES NFR BLD: 7 % (ref 5–13)
NEUTS SEG # BLD: 2.4 K/UL (ref 1.8–8)
NEUTS SEG NFR BLD: 60 % (ref 32–75)
NRBC # BLD: 0 K/UL (ref 0–0.01)
NRBC BLD-RTO: 0 PER 100 WBC
PLATELET # BLD AUTO: 304 K/UL (ref 150–400)
PMV BLD AUTO: 10.5 FL (ref 8.9–12.9)
POTASSIUM SERPL-SCNC: 3.1 MMOL/L (ref 3.5–5.1)
PROT SERPL-MCNC: 7.2 G/DL (ref 6.4–8.2)
RBC # BLD AUTO: 4.06 M/UL (ref 3.8–5.2)
SODIUM SERPL-SCNC: 137 MMOL/L (ref 136–145)
WBC # BLD AUTO: 4 K/UL (ref 3.6–11)

## 2021-06-29 PROCEDURE — 85025 COMPLETE CBC W/AUTO DIFF WBC: CPT

## 2021-06-29 PROCEDURE — 83036 HEMOGLOBIN GLYCOSYLATED A1C: CPT

## 2021-06-29 PROCEDURE — 80053 COMPREHEN METABOLIC PANEL: CPT

## 2021-06-29 PROCEDURE — 36415 COLL VENOUS BLD VENIPUNCTURE: CPT

## 2021-06-29 PROCEDURE — 71046 X-RAY EXAM CHEST 2 VIEWS: CPT

## 2021-06-29 PROCEDURE — 93005 ELECTROCARDIOGRAM TRACING: CPT

## 2021-06-29 RX ORDER — GLUCOSAMINE SULFATE 1500 MG
2000 POWDER IN PACKET (EA) ORAL EVERY OTHER DAY
COMMUNITY

## 2021-06-29 RX ORDER — ASPIRIN 81 MG/1
81 TABLET ORAL DAILY
COMMUNITY

## 2021-06-29 RX ORDER — LANOLIN ALCOHOL/MO/W.PET/CERES
1000 CREAM (GRAM) TOPICAL DAILY
COMMUNITY

## 2021-06-29 RX ORDER — ASCORBIC ACID 500 MG
500 TABLET ORAL EVERY OTHER DAY
COMMUNITY
End: 2022-03-30 | Stop reason: ALTCHOICE

## 2021-06-29 NOTE — PERIOP NOTES
Covid Vaccine series completed:  3/22/21,  4/23/21 MODERNA  Copy of vaccination card placed on chart. Patient given surgical site infection FAQs handout and hand hygiene tips sheet. Pre-operative instructions reviewed and patient verbalizes understanding of instructions. Patient has been given the opportunity to ask additional questions. Pt given 2 bottles of CHG soap and instructed in use. PATIENT GIVEN WRITTEN INSTRUCTIONS TO GO TO THE IMAGING CENTER/CANCER CENTER [address] [place] [address] [address] TO HAVE CHEST XRAY COMPLETED.

## 2021-06-30 LAB
ATRIAL RATE: 58 BPM
CALCULATED P AXIS, ECG09: 54 DEGREES
CALCULATED R AXIS, ECG10: 58 DEGREES
CALCULATED T AXIS, ECG11: -51 DEGREES
DIAGNOSIS, 93000: NORMAL
P-R INTERVAL, ECG05: 148 MS
Q-T INTERVAL, ECG07: 440 MS
QRS DURATION, ECG06: 82 MS
QTC CALCULATION (BEZET), ECG08: 431 MS
VENTRICULAR RATE, ECG03: 58 BPM

## 2021-06-30 NOTE — PERIOP NOTES
REVIEWED EKG WITH SOFI LIMA NP STATES IS OKAY    CALLED ABNORMAL LABS TO DR. MATOS OFFICE POTASSIUM IS L AT 3.1     ABNORMAL LABS FAXED VIA CC TO PCP OFFICE FOR REVIEW

## 2021-07-09 ENCOUNTER — ANESTHESIA EVENT (OUTPATIENT)
Dept: SURGERY | Age: 66
End: 2021-07-09
Payer: MEDICARE

## 2021-07-12 ENCOUNTER — ANESTHESIA (OUTPATIENT)
Dept: SURGERY | Age: 66
End: 2021-07-12
Payer: MEDICARE

## 2021-07-12 ENCOUNTER — HOSPITAL ENCOUNTER (OUTPATIENT)
Age: 66
Discharge: HOME | End: 2021-07-13
Attending: OBSTETRICS & GYNECOLOGY | Admitting: OBSTETRICS & GYNECOLOGY
Payer: MEDICARE

## 2021-07-12 DIAGNOSIS — G89.18 POSTOPERATIVE PAIN: Primary | ICD-10-CM

## 2021-07-12 DIAGNOSIS — C54.1 ENDOMETRIAL CANCER (HCC): ICD-10-CM

## 2021-07-12 PROCEDURE — 74011000250 HC RX REV CODE- 250: Performed by: OBSTETRICS & GYNECOLOGY

## 2021-07-12 PROCEDURE — 74011250636 HC RX REV CODE- 250/636: Performed by: REGISTERED NURSE

## 2021-07-12 PROCEDURE — 77030040922 HC BLNKT HYPOTHRM STRY -A

## 2021-07-12 PROCEDURE — 77030018778 HC MANIP UTER VCAR CNMD -B: Performed by: OBSTETRICS & GYNECOLOGY

## 2021-07-12 PROCEDURE — 74011250636 HC RX REV CODE- 250/636: Performed by: OBSTETRICS & GYNECOLOGY

## 2021-07-12 PROCEDURE — 77030040830 HC CATH URETH FOL MDII -A: Performed by: OBSTETRICS & GYNECOLOGY

## 2021-07-12 PROCEDURE — 77030020263 HC SOL INJ SOD CL0.9% LFCR 1000ML: Performed by: OBSTETRICS & GYNECOLOGY

## 2021-07-12 PROCEDURE — 77030016151 HC PROTCTR LNS DFOG COVD -B: Performed by: OBSTETRICS & GYNECOLOGY

## 2021-07-12 PROCEDURE — 77030031139 HC SUT VCRL2 J&J -A: Performed by: OBSTETRICS & GYNECOLOGY

## 2021-07-12 PROCEDURE — 76010000132 HC OR TIME 2.5 TO 3 HR: Performed by: OBSTETRICS & GYNECOLOGY

## 2021-07-12 PROCEDURE — 77030013079 HC BLNKT BAIR HGGR 3M -A: Performed by: ANESTHESIOLOGY

## 2021-07-12 PROCEDURE — 88309 TISSUE EXAM BY PATHOLOGIST: CPT

## 2021-07-12 PROCEDURE — 88342 IMHCHEM/IMCYTCHM 1ST ANTB: CPT

## 2021-07-12 PROCEDURE — 74011250637 HC RX REV CODE- 250/637: Performed by: ANESTHESIOLOGY

## 2021-07-12 PROCEDURE — 77030039895 HC SYST SMK EVAC LAP COVD -B: Performed by: OBSTETRICS & GYNECOLOGY

## 2021-07-12 PROCEDURE — 77030026438 HC STYL ET INTUB CARD -A: Performed by: REGISTERED NURSE

## 2021-07-12 PROCEDURE — 77030008603 HC TRCR ENDOSC EPATH J&J -C: Performed by: OBSTETRICS & GYNECOLOGY

## 2021-07-12 PROCEDURE — 74011000258 HC RX REV CODE- 258: Performed by: OBSTETRICS & GYNECOLOGY

## 2021-07-12 PROCEDURE — 74011250636 HC RX REV CODE- 250/636: Performed by: PHYSICIAN ASSISTANT

## 2021-07-12 PROCEDURE — 88331 PATH CONSLTJ SURG 1 BLK 1SPC: CPT

## 2021-07-12 PROCEDURE — 77030012770 HC TRCR OPT FX AMR -B: Performed by: OBSTETRICS & GYNECOLOGY

## 2021-07-12 PROCEDURE — 77030014008 HC SPNG HEMSTAT J&J -C: Performed by: OBSTETRICS & GYNECOLOGY

## 2021-07-12 PROCEDURE — 77030022704 HC SUT VLOC COVD -B: Performed by: OBSTETRICS & GYNECOLOGY

## 2021-07-12 PROCEDURE — 74011250637 HC RX REV CODE- 250/637: Performed by: PHYSICIAN ASSISTANT

## 2021-07-12 PROCEDURE — 77030010507 HC ADH SKN DERMBND J&J -B: Performed by: OBSTETRICS & GYNECOLOGY

## 2021-07-12 PROCEDURE — 77030012799 HC TRCR GELPRT BLN AMR -B: Performed by: OBSTETRICS & GYNECOLOGY

## 2021-07-12 PROCEDURE — 77030040361 HC SLV COMPR DVT MDII -B: Performed by: OBSTETRICS & GYNECOLOGY

## 2021-07-12 PROCEDURE — 77030008756 HC TU IRR SUC STRY -B: Performed by: OBSTETRICS & GYNECOLOGY

## 2021-07-12 PROCEDURE — 77030008684 HC TU ET CUF COVD -B: Performed by: REGISTERED NURSE

## 2021-07-12 PROCEDURE — 74011000254 HC RX REV CODE- 254: Performed by: OBSTETRICS & GYNECOLOGY

## 2021-07-12 PROCEDURE — 77030022703 HC LIGASURE  BLNT LAPSCP SEAL COVD -E: Performed by: OBSTETRICS & GYNECOLOGY

## 2021-07-12 PROCEDURE — 76060000036 HC ANESTHESIA 2.5 TO 3 HR: Performed by: OBSTETRICS & GYNECOLOGY

## 2021-07-12 PROCEDURE — 88307 TISSUE EXAM BY PATHOLOGIST: CPT

## 2021-07-12 PROCEDURE — 77030009957 HC RELD ENDOSTCH COVD -C: Performed by: OBSTETRICS & GYNECOLOGY

## 2021-07-12 PROCEDURE — 74011000250 HC RX REV CODE- 250: Performed by: REGISTERED NURSE

## 2021-07-12 PROCEDURE — 77030008602 HC TRCR ENDOSC EPATH J&J -B: Performed by: OBSTETRICS & GYNECOLOGY

## 2021-07-12 PROCEDURE — 88360 TUMOR IMMUNOHISTOCHEM/MANUAL: CPT

## 2021-07-12 PROCEDURE — 76210000017 HC OR PH I REC 1.5 TO 2 HR: Performed by: OBSTETRICS & GYNECOLOGY

## 2021-07-12 PROCEDURE — 74011250636 HC RX REV CODE- 250/636: Performed by: ANESTHESIOLOGY

## 2021-07-12 PROCEDURE — 2709999900 HC NON-CHARGEABLE SUPPLY: Performed by: OBSTETRICS & GYNECOLOGY

## 2021-07-12 PROCEDURE — 77030002933 HC SUT MCRYL J&J -A: Performed by: OBSTETRICS & GYNECOLOGY

## 2021-07-12 PROCEDURE — 88311 DECALCIFY TISSUE: CPT

## 2021-07-12 RX ORDER — FENTANYL CITRATE 50 UG/ML
INJECTION, SOLUTION INTRAMUSCULAR; INTRAVENOUS AS NEEDED
Status: DISCONTINUED | OUTPATIENT
Start: 2021-07-12 | End: 2021-07-12 | Stop reason: HOSPADM

## 2021-07-12 RX ORDER — PHENYLEPHRINE HCL IN 0.9% NACL 0.4MG/10ML
SYRINGE (ML) INTRAVENOUS
Status: DISCONTINUED | OUTPATIENT
Start: 2021-07-12 | End: 2021-07-12 | Stop reason: HOSPADM

## 2021-07-12 RX ORDER — GLYCOPYRROLATE 0.2 MG/ML
INJECTION INTRAMUSCULAR; INTRAVENOUS AS NEEDED
Status: DISCONTINUED | OUTPATIENT
Start: 2021-07-12 | End: 2021-07-12 | Stop reason: HOSPADM

## 2021-07-12 RX ORDER — SODIUM CHLORIDE 0.9 % (FLUSH) 0.9 %
5-40 SYRINGE (ML) INJECTION AS NEEDED
Status: DISCONTINUED | OUTPATIENT
Start: 2021-07-12 | End: 2021-07-12 | Stop reason: HOSPADM

## 2021-07-12 RX ORDER — FENTANYL CITRATE 50 UG/ML
50 INJECTION, SOLUTION INTRAMUSCULAR; INTRAVENOUS AS NEEDED
Status: DISCONTINUED | OUTPATIENT
Start: 2021-07-12 | End: 2021-07-12 | Stop reason: HOSPADM

## 2021-07-12 RX ORDER — PROCHLORPERAZINE EDISYLATE 5 MG/ML
10 INJECTION INTRAMUSCULAR; INTRAVENOUS
Status: DISCONTINUED | OUTPATIENT
Start: 2021-07-12 | End: 2021-07-12

## 2021-07-12 RX ORDER — EPHEDRINE SULFATE/0.9% NACL/PF 50 MG/5 ML
SYRINGE (ML) INTRAVENOUS AS NEEDED
Status: DISCONTINUED | OUTPATIENT
Start: 2021-07-12 | End: 2021-07-12 | Stop reason: HOSPADM

## 2021-07-12 RX ORDER — ACETAMINOPHEN 325 MG/1
650 TABLET ORAL
Qty: 60 TABLET | Refills: 0 | Status: SHIPPED | OUTPATIENT
Start: 2021-07-12 | End: 2021-07-17

## 2021-07-12 RX ORDER — SODIUM CHLORIDE, SODIUM LACTATE, POTASSIUM CHLORIDE, CALCIUM CHLORIDE 600; 310; 30; 20 MG/100ML; MG/100ML; MG/100ML; MG/100ML
125 INJECTION, SOLUTION INTRAVENOUS CONTINUOUS
Status: DISCONTINUED | OUTPATIENT
Start: 2021-07-12 | End: 2021-07-12 | Stop reason: HOSPADM

## 2021-07-12 RX ORDER — DOCUSATE SODIUM 100 MG/1
100 CAPSULE, LIQUID FILLED ORAL 2 TIMES DAILY
Qty: 40 CAPSULE | Refills: 1 | Status: SHIPPED | OUTPATIENT
Start: 2021-07-12 | End: 2021-07-21 | Stop reason: ALTCHOICE

## 2021-07-12 RX ORDER — TRAMADOL HYDROCHLORIDE 50 MG/1
50 TABLET ORAL
Qty: 20 TABLET | Refills: 0 | Status: SHIPPED | OUTPATIENT
Start: 2021-07-12 | End: 2021-07-17

## 2021-07-12 RX ORDER — SODIUM CHLORIDE 0.9 % (FLUSH) 0.9 %
5-40 SYRINGE (ML) INJECTION EVERY 8 HOURS
Status: DISCONTINUED | OUTPATIENT
Start: 2021-07-12 | End: 2021-07-12 | Stop reason: HOSPADM

## 2021-07-12 RX ORDER — ACETAMINOPHEN 325 MG/1
975 TABLET ORAL EVERY 6 HOURS
Status: DISCONTINUED | OUTPATIENT
Start: 2021-07-12 | End: 2021-07-13 | Stop reason: HOSPADM

## 2021-07-12 RX ORDER — FLUTICASONE PROPIONATE 50 MCG
1 SPRAY, SUSPENSION (ML) NASAL DAILY
Status: DISCONTINUED | OUTPATIENT
Start: 2021-07-12 | End: 2021-07-13 | Stop reason: HOSPADM

## 2021-07-12 RX ORDER — SODIUM CHLORIDE, SODIUM LACTATE, POTASSIUM CHLORIDE, CALCIUM CHLORIDE 600; 310; 30; 20 MG/100ML; MG/100ML; MG/100ML; MG/100ML
INJECTION, SOLUTION INTRAVENOUS
Status: DISCONTINUED | OUTPATIENT
Start: 2021-07-12 | End: 2021-07-12 | Stop reason: HOSPADM

## 2021-07-12 RX ORDER — ONDANSETRON 2 MG/ML
INJECTION INTRAMUSCULAR; INTRAVENOUS AS NEEDED
Status: DISCONTINUED | OUTPATIENT
Start: 2021-07-12 | End: 2021-07-12 | Stop reason: HOSPADM

## 2021-07-12 RX ORDER — SODIUM CHLORIDE 0.9 % (FLUSH) 0.9 %
5-40 SYRINGE (ML) INJECTION EVERY 8 HOURS
Status: DISCONTINUED | OUTPATIENT
Start: 2021-07-12 | End: 2021-07-13 | Stop reason: HOSPADM

## 2021-07-12 RX ORDER — LIDOCAINE HYDROCHLORIDE 20 MG/ML
INJECTION, SOLUTION EPIDURAL; INFILTRATION; INTRACAUDAL; PERINEURAL AS NEEDED
Status: DISCONTINUED | OUTPATIENT
Start: 2021-07-12 | End: 2021-07-12 | Stop reason: HOSPADM

## 2021-07-12 RX ORDER — MORPHINE SULFATE 2 MG/ML
2 INJECTION, SOLUTION INTRAMUSCULAR; INTRAVENOUS
Status: DISCONTINUED | OUTPATIENT
Start: 2021-07-12 | End: 2021-07-12 | Stop reason: HOSPADM

## 2021-07-12 RX ORDER — INDOCYANINE GREEN AND WATER 25 MG
KIT INJECTION AS NEEDED
Status: DISCONTINUED | OUTPATIENT
Start: 2021-07-12 | End: 2021-07-12 | Stop reason: HOSPADM

## 2021-07-12 RX ORDER — ROCURONIUM BROMIDE 10 MG/ML
INJECTION, SOLUTION INTRAVENOUS AS NEEDED
Status: DISCONTINUED | OUTPATIENT
Start: 2021-07-12 | End: 2021-07-12 | Stop reason: HOSPADM

## 2021-07-12 RX ORDER — LIDOCAINE HYDROCHLORIDE 10 MG/ML
0.1 INJECTION, SOLUTION EPIDURAL; INFILTRATION; INTRACAUDAL; PERINEURAL AS NEEDED
Status: DISCONTINUED | OUTPATIENT
Start: 2021-07-12 | End: 2021-07-12 | Stop reason: HOSPADM

## 2021-07-12 RX ORDER — HYDROMORPHONE HYDROCHLORIDE 2 MG/ML
INJECTION, SOLUTION INTRAMUSCULAR; INTRAVENOUS; SUBCUTANEOUS AS NEEDED
Status: DISCONTINUED | OUTPATIENT
Start: 2021-07-12 | End: 2021-07-12 | Stop reason: HOSPADM

## 2021-07-12 RX ORDER — SUCCINYLCHOLINE CHLORIDE 20 MG/ML
INJECTION INTRAMUSCULAR; INTRAVENOUS AS NEEDED
Status: DISCONTINUED | OUTPATIENT
Start: 2021-07-12 | End: 2021-07-12 | Stop reason: HOSPADM

## 2021-07-12 RX ORDER — MIDAZOLAM HYDROCHLORIDE 1 MG/ML
INJECTION, SOLUTION INTRAMUSCULAR; INTRAVENOUS AS NEEDED
Status: DISCONTINUED | OUTPATIENT
Start: 2021-07-12 | End: 2021-07-12 | Stop reason: HOSPADM

## 2021-07-12 RX ORDER — ONDANSETRON 2 MG/ML
4 INJECTION INTRAMUSCULAR; INTRAVENOUS AS NEEDED
Status: DISCONTINUED | OUTPATIENT
Start: 2021-07-12 | End: 2021-07-12 | Stop reason: HOSPADM

## 2021-07-12 RX ORDER — MIDAZOLAM HYDROCHLORIDE 1 MG/ML
1 INJECTION, SOLUTION INTRAMUSCULAR; INTRAVENOUS AS NEEDED
Status: DISCONTINUED | OUTPATIENT
Start: 2021-07-12 | End: 2021-07-12 | Stop reason: HOSPADM

## 2021-07-12 RX ORDER — TRAMADOL HYDROCHLORIDE 50 MG/1
50-100 TABLET ORAL
Status: DISCONTINUED | OUTPATIENT
Start: 2021-07-12 | End: 2021-07-13 | Stop reason: HOSPADM

## 2021-07-12 RX ORDER — ACETAMINOPHEN 325 MG/1
650 TABLET ORAL ONCE
Status: COMPLETED | OUTPATIENT
Start: 2021-07-12 | End: 2021-07-12

## 2021-07-12 RX ORDER — SODIUM CHLORIDE 9 MG/ML
25 INJECTION, SOLUTION INTRAVENOUS CONTINUOUS
Status: DISCONTINUED | OUTPATIENT
Start: 2021-07-12 | End: 2021-07-12 | Stop reason: HOSPADM

## 2021-07-12 RX ORDER — NEOSTIGMINE METHYLSULFATE 1 MG/ML
INJECTION, SOLUTION INTRAVENOUS AS NEEDED
Status: DISCONTINUED | OUTPATIENT
Start: 2021-07-12 | End: 2021-07-12 | Stop reason: HOSPADM

## 2021-07-12 RX ORDER — DEXAMETHASONE SODIUM PHOSPHATE 4 MG/ML
INJECTION, SOLUTION INTRA-ARTICULAR; INTRALESIONAL; INTRAMUSCULAR; INTRAVENOUS; SOFT TISSUE AS NEEDED
Status: DISCONTINUED | OUTPATIENT
Start: 2021-07-12 | End: 2021-07-12 | Stop reason: HOSPADM

## 2021-07-12 RX ORDER — PROPOFOL 10 MG/ML
INJECTION, EMULSION INTRAVENOUS AS NEEDED
Status: DISCONTINUED | OUTPATIENT
Start: 2021-07-12 | End: 2021-07-12 | Stop reason: HOSPADM

## 2021-07-12 RX ORDER — SODIUM CHLORIDE 0.9 % (FLUSH) 0.9 %
5-40 SYRINGE (ML) INJECTION AS NEEDED
Status: DISCONTINUED | OUTPATIENT
Start: 2021-07-12 | End: 2021-07-13 | Stop reason: HOSPADM

## 2021-07-12 RX ORDER — DIPHENHYDRAMINE HYDROCHLORIDE 50 MG/ML
12.5 INJECTION, SOLUTION INTRAMUSCULAR; INTRAVENOUS AS NEEDED
Status: DISCONTINUED | OUTPATIENT
Start: 2021-07-12 | End: 2021-07-12 | Stop reason: HOSPADM

## 2021-07-12 RX ORDER — MIDAZOLAM HYDROCHLORIDE 1 MG/ML
0.5 INJECTION, SOLUTION INTRAMUSCULAR; INTRAVENOUS
Status: DISCONTINUED | OUTPATIENT
Start: 2021-07-12 | End: 2021-07-12 | Stop reason: HOSPADM

## 2021-07-12 RX ORDER — OXYCODONE AND ACETAMINOPHEN 5; 325 MG/1; MG/1
1 TABLET ORAL AS NEEDED
Status: DISCONTINUED | OUTPATIENT
Start: 2021-07-12 | End: 2021-07-12 | Stop reason: HOSPADM

## 2021-07-12 RX ORDER — KETOROLAC TROMETHAMINE 30 MG/ML
15 INJECTION, SOLUTION INTRAMUSCULAR; INTRAVENOUS
Status: DISCONTINUED | OUTPATIENT
Start: 2021-07-12 | End: 2021-07-13 | Stop reason: HOSPADM

## 2021-07-12 RX ORDER — HYDROMORPHONE HYDROCHLORIDE 1 MG/ML
0.5 INJECTION, SOLUTION INTRAMUSCULAR; INTRAVENOUS; SUBCUTANEOUS
Status: DISCONTINUED | OUTPATIENT
Start: 2021-07-12 | End: 2021-07-13 | Stop reason: HOSPADM

## 2021-07-12 RX ORDER — BISOPROLOL FUMARATE AND HYDROCHLOROTHIAZIDE 5; 6.25 MG/1; MG/1
1 TABLET ORAL DAILY
Status: DISCONTINUED | OUTPATIENT
Start: 2021-07-13 | End: 2021-07-13 | Stop reason: HOSPADM

## 2021-07-12 RX ORDER — DOCUSATE SODIUM 100 MG/1
200 CAPSULE, LIQUID FILLED ORAL DAILY
Status: DISCONTINUED | OUTPATIENT
Start: 2021-07-13 | End: 2021-07-13 | Stop reason: HOSPADM

## 2021-07-12 RX ORDER — DIPHENHYDRAMINE HYDROCHLORIDE 50 MG/ML
12.5 INJECTION, SOLUTION INTRAMUSCULAR; INTRAVENOUS
Status: DISCONTINUED | OUTPATIENT
Start: 2021-07-12 | End: 2021-07-13 | Stop reason: HOSPADM

## 2021-07-12 RX ORDER — ONDANSETRON 2 MG/ML
4 INJECTION INTRAMUSCULAR; INTRAVENOUS
Status: DISCONTINUED | OUTPATIENT
Start: 2021-07-12 | End: 2021-07-13 | Stop reason: HOSPADM

## 2021-07-12 RX ORDER — SODIUM CHLORIDE 9 MG/ML
100 INJECTION, SOLUTION INTRAVENOUS CONTINUOUS
Status: DISCONTINUED | OUTPATIENT
Start: 2021-07-12 | End: 2021-07-13 | Stop reason: HOSPADM

## 2021-07-12 RX ORDER — FENTANYL CITRATE 50 UG/ML
25 INJECTION, SOLUTION INTRAMUSCULAR; INTRAVENOUS
Status: DISCONTINUED | OUTPATIENT
Start: 2021-07-12 | End: 2021-07-12 | Stop reason: HOSPADM

## 2021-07-12 RX ORDER — BUPIVACAINE HYDROCHLORIDE 5 MG/ML
INJECTION, SOLUTION EPIDURAL; INTRACAUDAL AS NEEDED
Status: DISCONTINUED | OUTPATIENT
Start: 2021-07-12 | End: 2021-07-12 | Stop reason: HOSPADM

## 2021-07-12 RX ORDER — HYDROMORPHONE HYDROCHLORIDE 1 MG/ML
0.2 INJECTION, SOLUTION INTRAMUSCULAR; INTRAVENOUS; SUBCUTANEOUS
Status: DISCONTINUED | OUTPATIENT
Start: 2021-07-12 | End: 2021-07-12 | Stop reason: HOSPADM

## 2021-07-12 RX ORDER — IBUPROFEN 200 MG
600 TABLET ORAL
Qty: 40 TABLET | Refills: 0 | Status: SHIPPED | OUTPATIENT
Start: 2021-07-12 | End: 2021-07-16

## 2021-07-12 RX ORDER — KETAMINE HYDROCHLORIDE 10 MG/ML
INJECTION, SOLUTION INTRAMUSCULAR; INTRAVENOUS AS NEEDED
Status: DISCONTINUED | OUTPATIENT
Start: 2021-07-12 | End: 2021-07-12 | Stop reason: HOSPADM

## 2021-07-12 RX ORDER — NALOXONE HYDROCHLORIDE 0.4 MG/ML
0.4 INJECTION, SOLUTION INTRAMUSCULAR; INTRAVENOUS; SUBCUTANEOUS AS NEEDED
Status: DISCONTINUED | OUTPATIENT
Start: 2021-07-12 | End: 2021-07-13 | Stop reason: HOSPADM

## 2021-07-12 RX ADMIN — KETAMINE HYDROCHLORIDE 10 MG: 10 INJECTION, SOLUTION INTRAMUSCULAR; INTRAVENOUS at 09:17

## 2021-07-12 RX ADMIN — Medication 10 MG: at 07:46

## 2021-07-12 RX ADMIN — TRAMADOL HYDROCHLORIDE 50 MG: 50 TABLET, FILM COATED ORAL at 18:50

## 2021-07-12 RX ADMIN — ROCURONIUM BROMIDE 5 MG: 10 SOLUTION INTRAVENOUS at 09:00

## 2021-07-12 RX ADMIN — Medication 10 MG: at 08:01

## 2021-07-12 RX ADMIN — Medication 30 MCG/MIN: at 08:00

## 2021-07-12 RX ADMIN — FENTANYL CITRATE 25 MCG: 50 INJECTION INTRAMUSCULAR; INTRAVENOUS at 10:47

## 2021-07-12 RX ADMIN — LIDOCAINE HYDROCHLORIDE 100 MG: 20 INJECTION, SOLUTION EPIDURAL; INFILTRATION; INTRACAUDAL; PERINEURAL at 07:32

## 2021-07-12 RX ADMIN — FENTANYL CITRATE 25 MCG: 50 INJECTION INTRAMUSCULAR; INTRAVENOUS at 11:02

## 2021-07-12 RX ADMIN — PROPOFOL 150 MG: 10 INJECTION, EMULSION INTRAVENOUS at 07:32

## 2021-07-12 RX ADMIN — SODIUM CHLORIDE, POTASSIUM CHLORIDE, SODIUM LACTATE AND CALCIUM CHLORIDE: 600; 310; 30; 20 INJECTION, SOLUTION INTRAVENOUS at 07:32

## 2021-07-12 RX ADMIN — ONDANSETRON HYDROCHLORIDE 4 MG: 2 INJECTION, SOLUTION INTRAMUSCULAR; INTRAVENOUS at 09:31

## 2021-07-12 RX ADMIN — CEFOXITIN SODIUM 2 G: 2 POWDER, FOR SOLUTION INTRAVENOUS at 07:52

## 2021-07-12 RX ADMIN — ACETAMINOPHEN 975 MG: 325 TABLET ORAL at 18:50

## 2021-07-12 RX ADMIN — SODIUM CHLORIDE, POTASSIUM CHLORIDE, SODIUM LACTATE AND CALCIUM CHLORIDE 125 ML/HR: 600; 310; 30; 20 INJECTION, SOLUTION INTRAVENOUS at 06:30

## 2021-07-12 RX ADMIN — DEXAMETHASONE SODIUM PHOSPHATE 8 MG: 4 INJECTION, SOLUTION INTRAMUSCULAR; INTRAVENOUS at 08:05

## 2021-07-12 RX ADMIN — FENTANYL CITRATE 25 MCG: 50 INJECTION INTRAMUSCULAR; INTRAVENOUS at 11:39

## 2021-07-12 RX ADMIN — GLYCOPYRROLATE 0.4 MG: 0.2 INJECTION, SOLUTION INTRAMUSCULAR; INTRAVENOUS at 09:33

## 2021-07-12 RX ADMIN — NEOSTIGMINE METHYLSULFATE 3 MG: 1 INJECTION, SOLUTION INTRAVENOUS at 09:33

## 2021-07-12 RX ADMIN — KETOROLAC TROMETHAMINE 15 MG: 30 INJECTION, SOLUTION INTRAMUSCULAR at 18:49

## 2021-07-12 RX ADMIN — MIDAZOLAM 2 MG: 1 INJECTION INTRAMUSCULAR; INTRAVENOUS at 07:21

## 2021-07-12 RX ADMIN — ACETAMINOPHEN 650 MG: 325 TABLET ORAL at 06:32

## 2021-07-12 RX ADMIN — FENTANYL CITRATE 50 MCG: 50 INJECTION, SOLUTION INTRAMUSCULAR; INTRAVENOUS at 08:10

## 2021-07-12 RX ADMIN — KETAMINE HYDROCHLORIDE 10 MG: 10 INJECTION, SOLUTION INTRAMUSCULAR; INTRAVENOUS at 08:18

## 2021-07-12 RX ADMIN — SODIUM CHLORIDE 100 ML/HR: 9 INJECTION, SOLUTION INTRAVENOUS at 10:20

## 2021-07-12 RX ADMIN — SODIUM CHLORIDE 100 ML/HR: 9 INJECTION, SOLUTION INTRAVENOUS at 19:39

## 2021-07-12 RX ADMIN — HYDROMORPHONE HYDROCHLORIDE 0.4 MG: 2 INJECTION, SOLUTION INTRAMUSCULAR; INTRAVENOUS; SUBCUTANEOUS at 09:49

## 2021-07-12 RX ADMIN — FENTANYL CITRATE 50 MCG: 50 INJECTION, SOLUTION INTRAMUSCULAR; INTRAVENOUS at 07:32

## 2021-07-12 RX ADMIN — SUCCINYLCHOLINE CHLORIDE 140 MG: 20 INJECTION, SOLUTION INTRAMUSCULAR; INTRAVENOUS at 07:33

## 2021-07-12 RX ADMIN — ACETAMINOPHEN 975 MG: 325 TABLET ORAL at 13:32

## 2021-07-12 RX ADMIN — ACETAMINOPHEN 975 MG: 325 TABLET ORAL at 23:57

## 2021-07-12 RX ADMIN — ROCURONIUM BROMIDE 5 MG: 10 SOLUTION INTRAVENOUS at 07:32

## 2021-07-12 RX ADMIN — ROCURONIUM BROMIDE 10 MG: 10 SOLUTION INTRAVENOUS at 07:54

## 2021-07-12 RX ADMIN — Medication 5 MG: at 07:48

## 2021-07-12 RX ADMIN — GLYCOPYRROLATE 0.2 MG: 0.2 INJECTION, SOLUTION INTRAMUSCULAR; INTRAVENOUS at 07:39

## 2021-07-12 RX ADMIN — ROCURONIUM BROMIDE 35 MG: 10 SOLUTION INTRAVENOUS at 07:42

## 2021-07-12 RX ADMIN — KETAMINE HYDROCHLORIDE 20 MG: 10 INJECTION, SOLUTION INTRAMUSCULAR; INTRAVENOUS at 07:42

## 2021-07-12 NOTE — PROGRESS NOTES
TRANSFER - IN REPORT:    Verbal report received from Rep RN(name) on Arcadio Penny  being received from PACU(unit) for routine post - op      Report consisted of patients Situation, Background, Assessment and   Recommendations(SBAR). Information from the following report(s) SBAR, Kardex, Intake/Output, MAR and Recent Results was reviewed with the receiving nurse. Opportunity for questions and clarification was provided. Assessment completed upon patients arrival to unit and care assumed.

## 2021-07-12 NOTE — ANESTHESIA PREPROCEDURE EVALUATION
Relevant Problems   RESPIRATORY SYSTEM   (+) Smoking      CARDIOVASCULAR   (+) HTN (hypertension)       Anesthetic History   No history of anesthetic complications            Review of Systems / Medical History  Patient summary reviewed, nursing notes reviewed and pertinent labs reviewed    Pulmonary          Smoker         Neuro/Psych   Within defined limits           Cardiovascular  Within defined limits  Hypertension: well controlled              Exercise tolerance: >4 METS     GI/Hepatic/Renal  Within defined limits              Endo/Other             Other Findings   Comments:            Physical Exam    Airway  Mallampati: II  TM Distance: > 6 cm  Neck ROM: normal range of motion   Mouth opening: Normal     Cardiovascular  Regular rate and rhythm,  S1 and S2 normal,  no murmur, click, rub, or gallop             Dental  No notable dental hx       Pulmonary  Breath sounds clear to auscultation               Abdominal  GI exam deferred       Other Findings            Anesthetic Plan    ASA: 3  Anesthesia type: general          Induction: Intravenous  Anesthetic plan and risks discussed with: Patient

## 2021-07-12 NOTE — ANESTHESIA POSTPROCEDURE EVALUATION
Post-Anesthesia Evaluation and Assessment    Patient: Melene Essex MRN: [id number]  SSN: [id number]    YOB: 1955  Age: 72 y.o. Sex: female      I have evaluated the patient and they are stable and ready for discharge from the PACU. Cardiovascular Function/Vital Signs  Visit Vitals  /68   Pulse (!) 54   Temp 36.4 °C (97.5 °F)   Resp 13   Ht 5' 4.5\" (1.638 m)   Wt 91.7 kg (202 lb 2.6 oz)   SpO2 100%   BMI 34.17 kg/m²       Patient is status post General anesthesia for Procedure(s):  TOTAL LAPAROSCOPIC HYSTERECTOMY, BILATERAL SALPINGO-OOPHORECTOMY, SENTINEL LYMPH NODE MAPPING AND BIOPSY. Nausea/Vomiting: None    Postoperative hydration reviewed and adequate. Pain:  Pain Scale 1: Numeric (0 - 10) (07/12/21 1047)  Pain Intensity 1: 5 (07/12/21 1047)   Managed    Neurological Status:   Neuro (WDL): Within Defined Limits (07/12/21 1000)  Neuro  Neurologic State: Drowsy; Eyes open to stimulus (07/12/21 1000)  Orientation Level: Oriented to person;Oriented to place;Oriented to situation;Disoriented to time (07/12/21 1000)  Cognition: Appropriate for age attention/concentration; Appropriate safety awareness; Follows commands (07/12/21 1000)  Speech: Appropriate for age;Clear (07/12/21 1000)  LUE Motor Response: Purposeful (07/12/21 1000)  LLE Motor Response: Purposeful (07/12/21 1000)  RUE Motor Response: Purposeful (07/12/21 1000)  RLE Motor Response: Purposeful (07/12/21 1000)   At baseline    Mental Status, Level of Consciousness: Alert and  oriented to person, place, and time    Pulmonary Status:   O2 Device: Nasal cannula (07/12/21 1002)   Adequate oxygenation and airway patent    Complications related to anesthesia: None    Post-anesthesia assessment completed.  No concerns    Signed By: oRnda Farooq MD     July 12, 2021              Procedure(s):  TOTAL LAPAROSCOPIC HYSTERECTOMY, BILATERAL SALPINGO-OOPHORECTOMY, SENTINEL LYMPH NODE MAPPING AND BIOPSY. general    <BSHSIANPOST>    INITIAL Post-op Vital signs:   Vitals Value Taken Time   /68 07/12/21 1045   Temp 36.4 °C (97.5 °F) 07/12/21 1002   Pulse 56 07/12/21 1059   Resp 15 07/12/21 1059   SpO2 100 % 07/12/21 1059   Vitals shown include unvalidated device data.

## 2021-07-12 NOTE — OP NOTES
Gynecologic Oncology Operative Report    Melene Essex  7/12/2021    PREOPERATIVE DIAGNOSIS:  1) Complex endometrial hyperplasia with atypia    POSTOPERATIVE DIAGNOSIS:  1) Endometrial cancer; 2) Intra-abdominal adhesions    PROCEDURE:    Total laparoscopic hysterectomy with bilateral salpingo-oophorectomy, Bilateral sentinel lymph node mapping and biopsy, lysis of adhesions    Surgeon:  Odilia Quintanilla MD    Assistant:  Stefany Colbert surgical assistance was required throughout the case due to the complexity of the procedure. He assisted with dissection, development of the retroperitoneal spaces, and identification of pertinent anatomy during the procedure. Anesthesia:  General endotracheal anesthesia    EBL:  <10 cc     Preoperative antibiotics: Cefotetan 2grams     VTE Prophylaxis: SCDs     Complications:  None    Implants: none    Specimens:  Uterus, cervix, bilateral fallopian tubes and ovaries, pelvic washings, and bilateral sentinel pelvic lymph nodes     Operative indications:  72 y.o. female with FIGO Grade 1 endometrial cancer     Operative findings: On laparoscopic survey, there were adhesions of the omentum to the anterior abdominal wall and overlying the entire pelvic inlet. She also had endosalpingiosis throughout the lower abdomen and culdesac. Uterus was 10-12 weeks size with multiple fibroids. Normal appearing bilateral tubes and ovaries. Right sentinel lymph node mapped to the right obturator space. Left sentinel lymph node mapped to the proximal left external iliac vessels. Normal appearing omentum, liver edge, diaphragm, small bowel, appendix, and rectosigmoid colon. Frozen pathology consistent with superficially invasive grade 1 endometrioid adenocarcinoma. Given the size of the uterus along with the intra-abdominal adhesions, a 22-modifier will be added.     Operative note:  After the risks, benefits, indications, and alternatives of the procedure were discussed with the patient and informed consent was obtained, the patient was taken to the operating room. She was positively identified, administered general anesthesia, and then placed in the dorsal lithotomy position in [address] [place] [address]. An exam under anesthesia was performed with the above findings. She was then prepped and draped in the usual fashion. A roldan catheter was inserted. A speculum was placed in the vagina and the anterior lip of the cervix was grasped with a tenaculum and then dilated. The cervix was injected with 3-cc of indigocynanine green at 3- and 9-o'clock. Then a V-care uterine manipulator was placed without difficulty. A 12RX umbilical incision was then made with #15-blade and carried down to the underlying fascia. The fascia was incised and the peritoneal cavity entered via an open Joce technique. 10-mm balloon trocar was then placed and intra-peritoneal placement confirmed via direct visualization. The abdomen was then insufflated with CO2 to a pressure of 15mmHg. The abdomen was surveyed with the above findings. Bilateral right and left lower quadrant 5-mm trocars were then inserted under direct visualization. Attention was first turned to taking down the omental adhesions. These adhesions were taken down the the LigaSure device. This took >30 minutes. Once free of adhesions, the omentum was placed in the upper abdomen. The patient was placed in Trendelenburg and pelvic washings were obtained. Attention was then turned to the pelvis. Bilateral round ligaments were sealed and divided with the LigaSure device and the bilateral pelvic side-wall retroperitoneum entered and developed. Bilateral ureters were identified and preserved. Bilateral superior vesical arteries, obturator nerves, and pelvic vasculature was identified and preserved. [place] lymph node mapping identified the above sentinel lymph nodes.  At this time, attention was turned to the hysterectomy, while the nodes were marked in case they needed to be removed based on frozen pathology. Bilateral infundibulopelvic ligaments were skeletonized, then sealed and divided with the LigaSure device. Dissection was continued inferiorly along the broad ligament and the bladder dissected off the lower uterine segment and cervix. Bilateral uterine arteries were skeletonized, then sealed and divided with the LigaSure device. A circumferential colpotomy was then carried along the V-care. The specimen was then placed in a 15mm EndoCatch bag and then removed via morcellation completely contained within the bag. The specimen was then sent for frozen pathology. The vaginal colpotomy was closed using the EndoStitch with 0-Vicryl V-lock suture. Frozen pathology was consistent with superficially invasive endometrial cancer. Attention was then turned back to the previously identified pelvic sentinel lymph nodes. An additional supra-pubic 5mm trocar was placed under direct visualization. Bilateral sentinel lymph nodes were skeletonized and removed and sent for permanent pathology. The pelvis was irrigated and made hemostatic. The intra-abdominal pressure was then decreased and all planes of dissection were confirmed hemostatic. Fibrillar was placed along all planes of dissection. The insufflation was released prior to removal of all port sites, then all trocars were removed. The umbilical fascia was reapproximated with a figure-of-8 stitch using 0-Vicryl on a UR-6 needle. All skin incisions were closed with 4-0 monocryl subcuticular stitch. Skin glue was applied to all skin incisions. The vagina was inspected with an intact vaginal cuff and no evidence of lacerations. The patient was taken out of stirrups, awakened from anesthesia, and taken to the recovery room in stable condition. The patient tolerated the procedure well. All instrument, sponge, and needle counts were correct.         Alessandro Dobbins MD  7/12/2021  9:45 AM

## 2021-07-12 NOTE — BRIEF OP NOTE
Brief Postoperative Note    Patient: Aletha Lyman  YOB: 1955  MRN: [id number]    Date of Procedure: 7/12/2021     Pre-Op Diagnosis: Endometrial hyperplasia [N85.00]    Post-Op Diagnosis: Endometrial cancer      Procedure(s):  TOTAL LAPAROSCOPIC HYSTERECTOMY, BILATERAL SALPINGO-OOPHORECTOMY, SENTINEL LYMPH NODE MAPPING AND BIOPSY, LYSIS OF ADHESIONS     Surgeon(s): Sherin Zapata MD    Surgical Assistant: Physician Assistant: Roxanne Ramirez PA-C    Anesthesia: General     Estimated Blood Loss (mL): Minimal    Complications: None    Specimens:   ID Type Source Tests Collected by Time Destination   1 : UTERUS, CERVIX, BILATERAL FALLOPIAN TUBES, AND BILATERAL OVARIES Frozen Section Uterus with Bilateral Fallopian tubes and Ovaries  Sherin Zapata MD 7/12/2021 0641 Pathology   2 : RIGHT PELVIC SENTINEL NODE Fresh Lymph Node  Sherin Zapata MD 7/12/2021 1861 Pathology   3 : LEFT PELVIC SENTINEL NODE Fresh Lymph Node  Sherin Zapata MD 7/12/2021 5998 Pathology        Implants: * No implants in log *    Drains: * No LDAs found *    Findings: On laparoscopic survey, there were adhesions of the omentum to the anterior abdominal wall and overlying the entire pelvic inlet. She also had endosalpingiosis throughout the lower abdomen and culdesac. Uterus was 10-12 weeks size with multiple fibroids. Normal appearing bilateral tubes and ovaries. Right sentinel lymph node mapped to the right obturator space. Left sentinel lymph node mapped to the proximal left external iliac vessels. Normal appearing omentum, liver edge, diaphragm, small bowel, appendix, and rectosigmoid colon. Frozen pathology consistent with superficially invasive grade 1 endometrioid adenocarcinoma.      Electronically Signed by Paul Zamorano MD on 7/12/2021 at 9:43 AM

## 2021-07-12 NOTE — DISCHARGE INSTRUCTIONS
[facility] [address]  P [phone]  F [phone]     Сергей Weinstein      Dear Ms. Washington Milan,      Please review your instructions with your nurse and ask any questions so you have all the information you need to recover well at home. If you do not feel you have everything you need to succeed and be safe after you leave the hospital, please discuss these concerns with your nurse. As always, call for any questions at home. Your doctor: Dr. Jerrell Flores  Diagnosis: Endometrial hyperplasia [N85.00]  Endometrial cancer ([facility] [address] [C54.1]  Procedure: Procedure(s):  TOTAL LAPAROSCOPIC HYSTERECTOMY, BILATERAL SALPINGO-OOPHORECTOMY, SENTINEL LYMPH NODE MAPPING AND BIOPSY  Date of Procedure: 7/12/2021      Take Home Medications     See Discharge Medication Review provided to you by your nurse. If you did not receive one, request this prior to your discharge. · It is important that you take your medications as they are prescribed. Your prescriptions are sent to your pharmacy on record. · Keep your medications in the bottles provided by the pharmacist and keep a list of the medication names, dosages, times to be taken and what they are for in your wallet. · Do not take other medications without consulting your doctor. · You may take acetaminophen (Tylenol) and ibuprofen (Advil) for pain. If this is not sufficient for your pain, take tramadol or other pain medication you were provided. · You should take a daily gentle stool softener such as a colace pill or dulcolax suppository for constipation as this is not uncommon after surgery and/or while on pain medication. If not prescribed, this can be found over the counter. If constipation persists for >24 hours you should take a dose of Milk of Magnesia. Call if your constipation continues. Diet    · Stay hydrated and drink fluids such as gatorade and water.  This will also help prevent constipation and dehydration. Limit somewhat any usual caffeine intake of beverages such as soda, tea and coffee as this may serve to dehydrate you. · For the first 2-3 days keep a low fiber diet avoiding raw vegetables or fruits with skin. A diet consisting of soup, cereal, yogurt, eggs, fish, Boost/Ensure. Avoid fatty/greasy foods. · If nauseated, keep your diet limited to liquids and call if this persists. Activity    · If possible, have someone with you at all times until you feel stable. · Gradually increase your activity each day. There are generally no restrictions on walking, climbing stairs or riding in a car. Walk at least 4 times per day. Walking will help reduce the risk of blood clots and constipation. · Showers are okay. If you have an incision, no tub bathing/swimming for two weeks. · No driving for 2 week and/or while on pain medication. · The following restrictions apply:  1. No heavy lifting greater than 15 lbs for 4 weeks, then 25 lbs for the next 4 weeks. 2. If you had any vaginal procedure or a hysterectomy, nothing per vagina for 6-8 weeks. 3. You may experience vaginal spotting. Should the bleeding require more that 4 pads a day please call our office. Incision    · You should expect some discomfort in the area of your incision, particularly as you increase your activity. If you notice an area of increasing redness or new drainage, please call your doctor. · Your incisions will have buried, absorbable sutures, which do not need to be removed and are covered by protective glue. Please allow this to fall off on its own over time and refrain from peeling it off unless it is no longer covering the incision. Causes For Concern    If any of the following occur, please call our office and speak with the Nurse/aid who will help you with your problem or ask the doctor to call you.      Problems with the incision, including increasing pain, swelling, redness or drainage.  Inability to pass urine    Increasing abdominal pain despite medication   Persisting nausea or vomiting.  Fever or chills and a temperature >101F   Constipation (no bowel movement for three days).  Diarrhea (more than three watery stools within 24 hours).  Excessive vaginal or wound bleeding.  If after hours and you cannot reach an on-call physician, call 911. Follow-Up    Call [phone] to schedule the following appointments with Dr. Joann Alcaraz:   Telephone virtual-visit in 10-14 days to discuss your pathology results.  In-office visit for your postoperative exam in 6 weeks from surgery. Information obtained by :  I understand that if any problems occur once I am at home I am to contact my physician. I understand and acknowledge receipt of the instructions indicated above.                                                                                                                                            Physician's or R.N.'s Signature                                                                  Date/Time                                                                                                                                              Patient or Representative Signature                                                          Date/Time

## 2021-07-12 NOTE — PERIOP NOTES
Attempted to contact family regarding patient's progress during procedure, reached voicemail and did not leave a message. 200 Spoke with daughter and provided updates regarding progress of procedure.

## 2021-07-12 NOTE — H&P
[address] [place] [address] [address], [address]  P [phone]  F [phone]    Office Note  Patient ID:  Name:  Melene Essex  MRN:  [id number]  :  1955/65 y.o. Date:  2021      HISTORY OF PRESENT ILLNESS:  Ms. Melene Essex is a 72 y.o. postmenopausal female who presents as a new patient from Dr. Aleksandar Finnegan for complex endometrial hyperplasia with atypia. The patient reports life stressors last summer, and during all of that she reports having a light period in 2020. She presented to her PCPs office in 2020 per patient and reported that she had a normal pap smear and no further workup was done. She later presented in 2021 to her PCP and saw her doctor rather than the NP who recommended a referral to an [place]. She ultimately saw Dr. Aleksandar Finnegan and underwent an endometrial biopsy on 2021 consistent with complex endometrial hyperplasia with atypia. Reports very minimal spotting now. Denies pelvic or abdominal pain/bloating. Denies CP or SOB. Denies nausea or vomiting. Denies change in appetite or bowel habits. Pertinent PMH/PSH: Smoker, HTN, HPL      Active, no restrictions. Pathology Review:  2021:  FINAL PATHOLOGIC DIAGNOSIS   Endometrium, biopsy:   Complex endometrial hyperplasia with architectural atypia, 2 mm fragment (limited tissue)       ROS:  A comprehensive review of systems was negative except for that written in the History of Present Illness.  , 10 point ROS    OB/GYN ROS:  Per HPI    ECOG ndGndrndanddndend:nd nd2nd Problem List:  Patient Active Problem List    Diagnosis Date Noted    EIN (endometrial intraepithelial neoplasia) 2021    Vitamin D deficiency 2018    Smoking 2018    Obesity (BMI 30.0-34.9) 08/10/2016    HTN (hypertension) 2012    Mixed hyperlipidemia 2012     PMH:  Past Medical History:   Diagnosis Date    Complex endometrial hyperplasia     COVID-19 vaccine series completed 3/22/21,  4/23/21    MODERNA    Herpes     High cholesterol     Hypercholesterolemia     Hypertension     Post-menopausal bleeding       PSH:  Past Surgical History:   Procedure Laterality Date    HX COLONOSCOPY        Social History:  Social History     Tobacco Use    Smoking status: Current Every Day Smoker     Packs/day: 0.30     Years: 40.00     Pack years: 12.00     Types: Cigarettes    Smokeless tobacco: Never Used   Substance Use Topics    Alcohol use: Yes     Alcohol/week: 0.0 standard drinks     Comment: Socially      Family History:  Family History   Problem Relation Age of Onset    Alzheimer Mother     Hypertension Sister     Asthma Brother     Cancer Maternal Aunt         bone ca    Hypertension Brother     Anesth Problems Neg Hx       Medications: (reviewed)  Current Facility-Administered Medications   Medication Dose Route Frequency    lactated Ringers infusion  125 mL/hr IntraVENous CONTINUOUS    0.9% sodium chloride infusion  25 mL/hr IntraVENous CONTINUOUS    sodium chloride (NS) flush 5-40 mL  5-40 mL IntraVENous Q8H    sodium chloride (NS) flush 5-40 mL  5-40 mL IntraVENous PRN    lidocaine (PF) (XYLOCAINE) 10 mg/mL (1 %) injection 0.1 mL  0.1 mL SubCUTAneous PRN    fentaNYL citrate (PF) injection 50 mcg  50 mcg IntraVENous PRN    midazolam (VERSED) injection 1 mg  1 mg IntraVENous PRN    cefOXitin (MEFOXIN) 2 g in 0.9% sodium chloride (MBP/ADV) 50 mL MBP  2 g IntraVENous ON CALL TO OR     Allergies: (reviewed)  No Known Allergies       OBJECTIVE:    Physical Exam:  VITAL SIGNS: Vitals:    07/12/21 0608   BP: (!) 153/70   Pulse: 62   Resp: 17   Temp: 98 °F (36.7 °C)   SpO2: 97%   Weight: 202 lb 2.6 oz (91.7 kg)   Height: 5' 4.5\" (1.638 m)     Body mass index is 34.17 kg/m². GENERAL YRN: Conversant, alert, oriented. No acute distress. HEENT: HEENT. No thyroid enlargement. No JVD. Neck: Supple without restrictions.    RESPIRATORY: Clear to auscultation and percussion to the bases. No CVAT. CARDIOVASC: RRR without murmur/rub. GASTROINT: soft, non-tender, without masses or organomegaly   MUSCULOSKEL: no joint tenderness, deformity or swelling       EXTREMITIES: extremities normal, atraumatic, no cyanosis or edema   PELVIC: Exam chaperoned by nurse. Normal appearing external genitalia. On speculum exam, normal appearing vagina and cervix. On bimanual exam, the cervix and uterus are normal size and mobile. No evidence of adnexal masses or nodularity. RECTAL: deferred   CAROLE SURVEY: No suspicious lymphadenopathy or edema noted. NEURO: Grossly intact. No acute deficit. Lab Date as available:    Lab Results   Component Value Date/Time    WBC 4.0 06/29/2021 02:30 PM    HGB 12.8 06/29/2021 02:30 PM    HCT 38.2 06/29/2021 02:30 PM    PLATELET 574 09/96/7975 02:30 PM    MCV 94.1 06/29/2021 02:30 PM     Lab Results   Component Value Date/Time    Sodium 137 06/29/2021 02:30 PM    Potassium 3.1 (L) 06/29/2021 02:30 PM    Chloride 101 06/29/2021 02:30 PM    CO2 30 06/29/2021 02:30 PM    Anion gap 6 06/29/2021 02:30 PM    Glucose 129 (H) 06/29/2021 02:30 PM    BUN 10 06/29/2021 02:30 PM    Creatinine 0.56 06/29/2021 02:30 PM    BUN/Creatinine ratio 18 06/29/2021 02:30 PM    GFR est AA >60 06/29/2021 02:30 PM    GFR est non-AA >60 06/29/2021 02:30 PM    Calcium 9.6 06/29/2021 02:30 PM         IMPRESSION/PLAN:    Ms. Jarvis Cramer is a 72 y.o. female with a working diagnosis of complex endometrial hyperplasia with atpyia    Problems:     Patient Active Problem List    Diagnosis Date Noted    EIN (endometrial intraepithelial neoplasia) 05/31/2021    Vitamin D deficiency 04/17/2018    Smoking 04/17/2018    Obesity (BMI 30.0-34.9) 08/10/2016    HTN (hypertension) 12/17/2012    Mixed hyperlipidemia 12/17/2012       I reviewed Ms. Carrillo Karen Shearer's course to date, including her medical records, recent studies, physical exam, and review of symptoms.  Counseled patient regarding standard of care recommendations for complex endometrial hyperplasia with atypia, including hysterectomy. Discussed the natural history of endometrial hyperplasia and the role of estrogen in this process. Also counseled patient regarding a 20-40% underlying risk of an overt endometrial cancer. Therefore, I have recommended proceeding with sentinel lymph node mapping and definitive hysterectomy with frozen section at the time of surgery. If there is an endometrial cancer present on frozen pathology, will plan to proceed with sentinel lymph node dissection and surgical staging as indicated. Given the patient's age, I have also recommended removal of both tubes and ovaries regardless of the frozen pathology. Plan for TLH/BSO with sentinel lymph node mapping, followed by possible SLND, possible pelvic and/or para-aortic LND, and possible exploratory laparotomy. Posted for surgery on 7/8/2021. Plan for CBCD, CMP, HbA1c, EKG, and CXR prior to surgery. Counseled patient regarding risks, benefits, indications, and alternatives to surgery. Plan to sign consents day of surgery. All questions and concerns were addressed with the patient and she is comfortable with the plan. Defined Sensitive Document    >50% of total time allocated to visit dedicated to counseling, 60 minutes total.    Signed By: Cassie Silveira MD     7/12/2021/8:09 AM     Date of Surgery Update:  Camilo Delarosa was seen and examined. History and physical has been reviewed. The patient has been examined.  There have been no significant clinical changes since the completion of the originally dated History and Physical.    Signed By: Cassie Silveira MD     July 12, 2021 7:00 AM

## 2021-07-12 NOTE — PERIOP NOTES
Patient: Yazmin Fong MRN: [id number]  SSN: [id number]   YOB: 1955  Age: 72 y.o. Sex: female     Patient is status post Procedure(s):  TOTAL LAPAROSCOPIC HYSTERECTOMY, BILATERAL SALPINGO-OOPHORECTOMY, SENTINEL LYMPH NODE MAPPING AND BIOPSY.     Surgeon(s) and Role:     * Lawrence Braun MD - Primary    Local/Dose/Irrigation:  0.5% Bupivacaine 30 mL                  Peripheral IV 07/12/21 Left Hand (Active)            Airway - Endotracheal Tube 07/12/21 Oral (Active)                   Dressing/Packing:  Incision 07/12/21 Abdomen-Dressing/Treatment: Surgical glue (07/12/21 0935)  Incision 07/12/21 Vagina-Dressing/Treatment: Sanjuanita [place] (07/12/21 0935)    Splint/Cast:  ]    Other:

## 2021-07-12 NOTE — PROGRESS NOTES
Observation notice provided in writing to patient and/or caregiver as well as verbal explanation of the policy. Patients who are in outpatient status also receive the Observation notice. Patient has received notice and or patient representative has received via secure email, fax, or certified mail based on patient representative's preference.      Kristina De La Cruz MS

## 2021-07-13 VITALS
HEIGHT: 65 IN | BODY MASS INDEX: 33.68 KG/M2 | RESPIRATION RATE: 16 BRPM | DIASTOLIC BLOOD PRESSURE: 64 MMHG | SYSTOLIC BLOOD PRESSURE: 124 MMHG | TEMPERATURE: 97.9 F | OXYGEN SATURATION: 98 % | WEIGHT: 202.16 LBS | HEART RATE: 55 BPM

## 2021-07-13 LAB
ANION GAP SERPL CALC-SCNC: 7 MMOL/L (ref 5–15)
BUN SERPL-MCNC: 10 MG/DL (ref 6–20)
BUN/CREAT SERPL: 15 (ref 12–20)
CALCIUM SERPL-MCNC: 8.3 MG/DL (ref 8.5–10.1)
CHLORIDE SERPL-SCNC: 102 MMOL/L (ref 97–108)
CO2 SERPL-SCNC: 26 MMOL/L (ref 21–32)
CREAT SERPL-MCNC: 0.65 MG/DL (ref 0.55–1.02)
ERYTHROCYTE [DISTWIDTH] IN BLOOD BY AUTOMATED COUNT: 12.1 % (ref 11.5–14.5)
GLUCOSE SERPL-MCNC: 111 MG/DL (ref 65–100)
HCT VFR BLD AUTO: 35 % (ref 35–47)
HGB BLD-MCNC: 11.5 G/DL (ref 11.5–16)
MCH RBC QN AUTO: 31.3 PG (ref 26–34)
MCHC RBC AUTO-ENTMCNC: 32.9 G/DL (ref 30–36.5)
MCV RBC AUTO: 95.1 FL (ref 80–99)
NRBC # BLD: 0 K/UL (ref 0–0.01)
NRBC BLD-RTO: 0 PER 100 WBC
PLATELET # BLD AUTO: 269 K/UL (ref 150–400)
PMV BLD AUTO: 10.6 FL (ref 8.9–12.9)
POTASSIUM SERPL-SCNC: 3.8 MMOL/L (ref 3.5–5.1)
RBC # BLD AUTO: 3.68 M/UL (ref 3.8–5.2)
SODIUM SERPL-SCNC: 135 MMOL/L (ref 136–145)
WBC # BLD AUTO: 11.5 K/UL (ref 3.6–11)

## 2021-07-13 PROCEDURE — 80048 BASIC METABOLIC PNL TOTAL CA: CPT

## 2021-07-13 PROCEDURE — 85027 COMPLETE CBC AUTOMATED: CPT

## 2021-07-13 PROCEDURE — 99024 POSTOP FOLLOW-UP VISIT: CPT | Performed by: PHYSICIAN ASSISTANT

## 2021-07-13 PROCEDURE — 36415 COLL VENOUS BLD VENIPUNCTURE: CPT

## 2021-07-13 PROCEDURE — 74011250637 HC RX REV CODE- 250/637: Performed by: PHYSICIAN ASSISTANT

## 2021-07-13 RX ADMIN — FLUTICASONE PROPIONATE 1 SPRAY: 50 SPRAY, METERED NASAL at 08:11

## 2021-07-13 RX ADMIN — TRAMADOL HYDROCHLORIDE 50 MG: 50 TABLET, FILM COATED ORAL at 10:50

## 2021-07-13 RX ADMIN — ACETAMINOPHEN 975 MG: 325 TABLET ORAL at 06:40

## 2021-07-13 RX ADMIN — DOCUSATE SODIUM 200 MG: 100 CAPSULE, LIQUID FILLED ORAL at 08:11

## 2021-07-13 NOTE — PROGRESS NOTES
[facility]  [address] [facility] [address] [facility]  P [phone]  F [phone]       Patient: Roxann Perdue  Admit Date: 7/12/2021  Admit Dx: Endometrial hyperplasia [N85.00]  Endometrial cancer ([address] [C54.1]    Subjective:     No events, pain controlled. No N/V, F/C, SOB. Ambulating. Voiding. Objective:     Date 07/12/21 0700 - 07/13/21 0659 07/13/21 0700 - 07/14/21 0659   Shift 7660-7256 7331-9253 24 Hour Total 3096-3719 9860-0726 24 Hour Total   INTAKE   P.O.  500 500        P. O.  500 500      I. V.(mL/kg/hr) 1100(1)  1100(0.5)        I.V. 150  150        Volume (lactated Ringers infusion) 900  900        Volume (cefOXitin (MEFOXIN) 2 g in 0.9% sodium chloride (MBP/ADV) 50 mL MBP) 50  50      Shift Total(mL/kg) 1100(12) 500(5.5) 1600(17.4)      OUTPUT   Urine(mL/kg/hr) 350(0.3) 1300(1.2) 1650(0.7)        Urine Output 200  200        Urine Output (mL) ([REMOVED] Urinary Catheter 07/12/21 2- way; Jerry) 150 1300 1450      Blood 10  10        Estimated Blood Loss 10  10      Shift Total(mL/kg) 360(3.9) 1300(14.2) 5487(13.6)       -800 -60      Weight (kg) 91.7 91.7 91.7 91.7 91.7 91.7       Physical Exam  /73 (BP 1 Location: Right upper arm, BP Patient Position: At rest)   Pulse 62   Temp 97.6 °F (36.4 °C)   Resp 16   Ht 5' 4.5\" (1.638 m)   Wt 202 lb 2.6 oz (91.7 kg)   LMP 10/26/2020   SpO2 98%   BMI 34.17 kg/m²      General:  alert, cooperative, no distress     Cardiac:  Regular rate and rhythm        Lungs:  nonlabored  Abdomen:  soft, nondistended, nontender       Wound:  clean, dry, no drainage   Extremity: no edema    Wt Readings from Last 3 Encounters:   07/12/21 202 lb 2.6 oz (91.7 kg)   06/29/21 202 lb 2.6 oz (91.7 kg)   05/26/21 200 lb 9.6 oz (91 kg)         Data Review      Recent Labs     07/13/21  0559   WBC 11.5*   HGB 11.5   HCT 35.0        Recent Labs     07/13/21  0559   *   K 3.8      *   BUN 10   CREA 0. 72   CA 8.3*         Assessment/Plan:     Admitted for Endometrial hyperplasia [N85.00]  Endometrial cancer ([facility] [C54.1]    Active Hospital Problems    Diagnosis Date Noted    Endometrial cancer ([facility] 07/12/2021       Jethro Shearer 1 Day Post-Op Procedure(s):  TOTAL LAPAROSCOPIC HYSTERECTOMY, BILATERAL SALPINGO-OOPHORECTOMY, SENTINEL LYMPH NODE MAPPING AND BIOPSY for Endometrial hyperplasia    Onc: low stage endometrial cancer  Heme/CV: Hemodynamically stable  FEN/GI: diet as   ID/Wound: afeb, abd benign   Neuro: pain well controlled  PPX: ambulate, SCDs  Disposition: Doing well postoperatively. Discharge home today with friends available for assistance in recovery. DC instructions, medications and follow-up discussed and questions were answered.          Rossy Finney PA-C

## 2021-07-13 NOTE — PROGRESS NOTES
Bedside and Verbal shift change report given to Bruce Cross (oncoming nurse) by Sherif Rodriguez RN (offgoing nurse). Report included the following information SBAR, Kardex, Intake/Output, MAR and Recent Results. 1130: Discharge medications reviewed with patient and appropriate educational materials and side effects teaching were provided. I have reviewed discharge instructions with the patient. The patient verbalized understanding.       1217: pt left via wheelchair to discharge

## 2021-07-20 NOTE — PROGRESS NOTES
Virtual Post op Visit to discuss pathology report, surgery was on 7/12/2021    1. Have you been to the ER, urgent care clinic since your last visit? Hospitalized since your last visit? Yes, surgery with Dr. Lisa Greenberg on 7/12/2021    2. Have you seen or consulted any other health care providers outside of the [address] [place] [address] since your last visit? Include any pap smears or colon screening.    no

## 2021-07-21 ENCOUNTER — VIRTUAL VISIT (OUTPATIENT)
Dept: GYNECOLOGY | Age: 66
End: 2021-07-21
Payer: MEDICARE

## 2021-07-21 DIAGNOSIS — C54.1 ENDOMETRIAL CANCER (HCC): Primary | ICD-10-CM

## 2021-07-21 PROCEDURE — 99024 POSTOP FOLLOW-UP VISIT: CPT | Performed by: OBSTETRICS & GYNECOLOGY

## 2021-07-22 NOTE — PROGRESS NOTES
"  Nehemias is a 25 year old who is being evaluated via a billable video visit.        Type of service:  Billable Video Visit        Return Medical Weight Management Note     Nehemias Mascorro  MRN:  [id number]  :  1995  JASMEET:  2021    Dear Ludwin Elam MD,    I had the pleasure of seeing your patient Nehemias Mascorro. She is a 25 year old female who I am continuing to see for treatment of obesity related to:    No flowsheet data found.    INTERVAL HISTORY:    She was last seen about 1 mo ago.  Reviewed and relevant history as extracted from last visit is as follows--  -------------------------------------------  \"Notes the program is helping with making changes with food and be accountable and these are the things she notes she is most focussed on now--  1. Working on protein shake AM  2. Eat slower/mindful eating--working on this  3.  Walking several times per week with her mother--not happening yet with [employer] work going     Regarding the GI issue she has been working on with [facility]--doing PT to work on coordinating with BMs; pain and nausea and constipation.  That is improving some   --getting nausea still a couple of times per week; stomach cramping about 3 times per month, stomach pain a couple of times per week  --BMs 3 times per week;  In the past there were times that would be less than once per week        Regarding medications related/relevant to wt loss and co-morbidities I note the following--     1. lexapro changed to Prozac--now has stabilized with plan to titrate as needed---> in the interim Prozac dose was increased but is working much better than the other  2. Adderall continuing 25mg daily  4. Abilify--taking 15mg  5. buproprion at 300mg taking  6. Metformin 1000mg BID tolerating without any side effects)--unsure if this has helped any with appetite (escalated after last visit to full dose)  7. Prozac--40 mg daily...     She notes the following--  Started school " [address] [facility] [address] [facility]  P [phone]  F [phone]    Office Note  Patient ID:  Name:  Kevan Parikh  MRN:  [id number]  :  1955/65 y.o. Date:  2021      HISTORY OF PRESENT ILLNESS:  Ms. Kevan Parikh is a 72 y.o. female with a working diagnosis of complex endometrial hyperplasia with atypia. On 2021 underwent Total laparoscopic hysterectomy with bilateral salpingo-oophorectomy, Bilateral sentinel lymph node mapping and biopsy, lysis of adhesions. Final pathology consistent with Stage Ia, FIGO Grade 1 endometrial cancer. 1mm myometrial invasion (<50%). Negative LVSI. Negative SLN. Presents today for pathology discussion via virtual visit. Initial History:  Ms. Kevan Parikh is a 72 y.o. postmenopausal female who presents as a new patient from Dr. Jf Peralta for complex endometrial hyperplasia with atypia. The patient reports life stressors last summer, and during all of that she reports having a light period in 2020. She presented to her PCPs office in 2020 per patient and reported that she had a normal pap smear and no further workup was done. She later presented in 2021 to her PCP and saw her doctor rather than the NP who recommended a referral to an [place]. She ultimately saw Dr. Jf Peralta and underwent an endometrial biopsy on 2021 consistent with complex endometrial hyperplasia with atypia. Reports very minimal spotting now. Denies pelvic or abdominal pain/bloating. Denies CP or SOB. Denies nausea or vomiting. Denies change in appetite or bowel habits. Pertinent PMH/PSH: Smoker, HTN, HPL      Active, no restrictions. Pathology Review:  2021:  * * *FINAL PATHOLOGIC DIAGNOSIS* * *  1.  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  Now sometimes having seconds at supper.     Pt is working through the 24 wk program--nutritionist is trying to connect with her for wk 4 visit, last visit with health  was 9/15/20     Goals before around structured eating--  1.  Have protein drink (Terra's Way)--110 calories; breakfast routine lately has been higher calorie (eggs/yogurt/oatmeal)  2.  Add protein to snack if having a snack  3.  Change from fruit cups to whole fruit (meeting)        ...in PT for the abd issues/anal sphinctor symptoms,  Some improvement in symptoms but still still with abd pains.  Learning how to lose the bathroom more regularly and less constipated now.  Noting BMs about 4 times per wk\"     Food habits/plan--  Br protein shake  Petra leftovers or frozen meals (lean cuisine)  Di family meal at home  ----Sometimes lunch and dinner will be at [employer]  ----Snacks--when snacking will be protein bar or fruit  ----Liquids--crystal lite, water, with coffee will use Truvia/1% milk     Activity-  Trying to get 60 active minutes per day (phone goal); 30 minutes for walks 2-3 days per wk\"  -----------------------------------        Goals she had last month--  1.  1400 Calories (Myfitnesspal, measuring food with scales, eating more whole foods)  2.  Activity goals--3 days per wk walking (30 min)  3.  Not eating after dinner (eat dinner and then stay busy after it)     24 wk program--may do this again    --not sure about whether to do the health coaching  --pt notes she did not feel like participating  --pt notes it \"was just hard for me\" (was not ready and did not have enough time), feels that things are even crazier but in a better place with eating now    Changes lately--  Eating 2 " Leiomyomas (228 g specimen)        Serosa: Fibrous serosal adhesions involving uterus and adnexa        2.  Right pelvic sentinel lymph node, biopsy:        One lymph node, negative for metastatic carcinoma, levels and   cytokeratin stain examined (0/1)  3.  Left pelvic sentinel lymph node, biopsy:        One lymph node, negative for metastatic carcinoma, levels and   cytokeratin stain examined (0/1)  ENDOMETRIUM    SPECIMEN       Procedure: Total hysterectomy and bilateral salpingo-oophorectomy       Specimen Integrity: Fragmented    TUMOR       Histologic Type: Endometrioid carcinoma, NOS       Histologic Grade: FIGO grade 1       Myometrial Invasion: Present; exact percentage invasion cannot be   determined           Reason Percentage of Invasion Cannot be Determined: Myometrial   thickness cannot             be determined: Specimen fragmented           Depth of invasion: 1 mm           Estimated Percentage of Myometrial Invasion: Less than 50%       Uterine Serosa Involvement: Not identified       Lower Uterine Segment Involvement: Not identified       Cervical Stromal Involvement: Not identified       Other Tissue / Organ Involvement: Not identified       Peritoneal Ascitic Fluid: Not submitted / unknown       Lymphovascular Invasion: Not identified    MARGINS       Margins: Not applicable    LYMPH NODES       Lymph Node Status:  All lymph nodes negative for tumor cells           Total Number of Pelvic Nodes Examined: 2           Number of Pelvic [place] Nodes Examined: 2           Total Number of Para-aortic Nodes Examined: 0    PATHOLOGIC STAGE CLASSIFICATION (pTNM, AJCC 8th Edition)       Primary Tumor (pT): pT1a       Regional Lymph Nodes Modifier: (sn)       Regional Lymph Nodes (pN): pN0    FIGO STAGE       FIGO Stage: IA    ADDITIONAL FINDINGS       Additional Findings: Benign endometrial polyp  Assessment of mismatch repair and estrogen receptor pending, please see   addendum     5/11/2021:  FINAL PATHOLOGIC DIAGNOSIS   Endometrium, biopsy:   Complex endometrial hyperplasia with architectural atypia, 2 mm fragment (limited tissue)       ROS:  A comprehensive review of systems was negative except for that written in the History of Present Illness. , 10 point ROS    OB/GYN ROS:  Per HPI    ECOG ndGndrndanddndend:nd nd2nd Problem List:  Patient Active Problem List    Diagnosis Date Noted    Endometrial [facility] ([facility] 07/12/2021    EIN (endometrial intraepithelial neoplasia) 05/31/2021    Vitamin D deficiency 04/17/2018    Smoking 04/17/2018    Obesity (BMI 30.0-34.9) 08/10/2016    HTN (hypertension) 12/17/2012    Mixed hyperlipidemia 12/17/2012     PMH:  Past Medical History:   Diagnosis Date    Complex endometrial hyperplasia     COVID-19 vaccine series completed 3/22/21,  4/23/21    MODERNA    Herpes     High cholesterol     Hypercholesterolemia     Hypertension     Post-menopausal bleeding       PSH:  Past Surgical History:   Procedure Laterality Date    HX COLONOSCOPY        Social History:  Social History     Tobacco Use    Smoking status: Current Every Day Smoker     Packs/day: 0.30     Years: 40.00     Pack years: 12.00     Types: Cigarettes    Smokeless tobacco: Never Used   Substance Use Topics    Alcohol use: Yes     Alcohol/week: 0.0 standard drinks     Comment: Socially      Family History:  Family History   Problem Relation Age of Onset   Ph[place] Alzheimer Mother     Hypertension Sister     Asthma Brother     Cancer Maternal Aunt         bone ca    Hypertension Brother     Anesth Problems Neg Hx       Medications: (reviewed)  Current Outpatient Medications   Medication Sig    aspirin delayed-release 81 mg tablet Take 81 mg by mouth daily. Pt stated she takes couple times a week    cyanocobalamin (Vitamin B-12) 1,000 mcg tablet Take 1,000 mcg by mouth daily.  ascorbic acid, vitamin C, (Vitamin C) 500 mg tablet Take 500 mg by mouth every other day.     cholecalciferol (Vitamin D3) 25 mcg (1,000 meals and one snack  --doing more cooking (pasta, salmon, chicken; banana muffins and will share at [employer]; made cookies and had 2 but took the rest to [employer])    Council instant breakfast (before school)  Protein shake at noon (post class)  6-7p dinner (salmon, kale, roasted sweet potatoes)    Last couple of days waking up hungry (will eat 1/2 of a Tomi bar, fruit)    Daily activity/steps (4000 steps); walking now and will have a goal of 5000 steps per day    She notes her GI doctor gave the OK if GLP-1 agnonist is needed to support wt loss    CURRENT WEIGHT:     263# today      WEIGHT last visit:   260#      Wt Readings from Last 3 Encounters:   09/25/20 116.5 kg (256 lb 12.8 oz)   02/28/20 119.9 kg (264 lb 5.3 oz)   11/22/19 118.8 kg (261 lb 14.5 oz)            Changes and Difficulties 1/25/2019   I have made the following changes to my diet since my last visit: Eating less and more vegetables   With regards to my diet, I am still struggling with: -   I have made the following changes to my activity/exercise since my last visit: Joined a gym   With regards to my activity/exercise, I am still struggling with: -       MEDICATIONS:   Current Outpatient Medications   Medication Sig Dispense Refill     albuterol (PROAIR HFA/PROVENTIL HFA/VENTOLIN HFA) 108 (90 Base) MCG/ACT inhaler Inhale 2 puffs into the lungs       amphetamine-dextroamphetamine (ADDERALL XR) 30 MG 24 hr capsule TK 1 C PO D FOR ADHD       ARIPiprazole (ABILIFY) 15 MG tablet Take 1 tablet by mouth       BuPROPion HCl (WELLBUTRIN PO) Take 300 mg by mouth daily        cetirizine (ZYRTEC) 10 MG tablet Take 10 mg by mouth daily Reported on 4/27/2017       DIAZEPAM PO Take 5 mg by mouth       ESCITALOPRAM OXALATE PO Take 20 mg by mouth daily       Fexofenadine HCl (ALLEGRA PO) Take by mouth daily as needed for allergies       GLYCOPYRROLATE PO Take 2 mg by mouth daily       GUANFACINE HCL PO Take 2 mg by mouth daily       IBUPROFEN PO Take 600 mg by  mouth       Ipratropium-Albuterol (COMBIVENT RESPIMAT)  MCG/ACT inhaler Inhale 1 puff into the lungs 4 times daily       Lansoprazole (PREVACID PO)        metFORMIN (GLUCOPHAGE) 500 MG tablet For 2 wks take 2 tablets AM with breakfast then take 1 tablet twice daily with meal, and then increase to 2 tablets twice daily, as tolerated (Patient not taking: Reported on 12/11/2020) 360 tablet 1     NATAZIA 3/2-2/2-3/1 MG TABS TK 1 T PO QD  0     norethindrone-ethinyl estradiol (JUNEL FE 1/20) 1-20 MG-MCG per tablet Take 1 tablet by mouth daily       Probiotic Product (PROBIOTIC DAILY PO)        TIZANIDINE HCL PO Take 4 mg by mouth         Weight Loss Medication History Reviewed With Patient 1/25/2019   Which weight loss medications are you currently taking on a regular basis?  Naltrexone   Are you having any side effects from the weight loss medication that we have prescribed you? No       ASSESSMENT;  Morbid obesity in pt with wt gain over past few years, more since her TBI; working now with neuro and psych. Is having some wt loss over last couple of month        PLAN:   (continues)  Decrease portion sizes  No meals in front of TV screen  Purge house of food triggers  No meal skipping and avoid snacking  Decrease caloric beverages--avoid soda  Volumetrics low carb eating plan--structured plan and avoding snacking  Low Calorie/low fat diet  Meal planning/journaling           additionally--  --would recommend to continue on metformin at current full dose (2 g)--however it she has had gaps in taking this she should start back up with 2 tabets at breakfast for 1-2 wks and then add a tablet at supper for 1-2 wks and then get back to 2 tablets twice daily ; today we again considered possible GLP1 agonist addition with her.  --continue with our 24 week health coaching/nutrition program      Goals she discussed--  1.  1400 Calories (Myfitnesspal, measuring food with scales, eating more whole foods)  2.  Not eating after  unit) cap Take 2,000 Units by mouth every other day.  bisoprolol-hydroCHLOROthiazide (ZIAC) 5-6.25 mg per tablet TAKE 1 TABLET BY MOUTH ONCE DAILY IN THE MORNING    simvastatin (ZOCOR) 20 mg tablet Take 1 tablet by mouth nightly    hydroCHLOROthiazide (HYDRODIURIL) 25 mg tablet Take 1 Tablet by mouth daily. DC HCTZ 12.5 mg    Cetirizine 10 mg cap Take 10 mg by mouth Every morning.  fluticasone propionate (Flonase Allergy Relief) 50 mcg/actuation nasal spray 1 [place] by Both Nostrils route daily.  calcium carbonate (TUMS) 200 mg calcium (500 mg) chew Take 1 Tab by mouth daily as needed.  valACYclovir (VALTREX) 500 mg tablet Take  by mouth two (2) times daily as needed. No current facility-administered medications for this visit. Allergies: (reviewed)  No Known Allergies       OBJECTIVE:  *deferred today given video-conference visit for ongoing COVID-19 pandemic*   Physical Exam:  VITAL SIGNS: There were no vitals filed for this visit. There is no height or weight on file to calculate BMI. GENERAL YRN: Conversant, alert, oriented. No acute distress. HEENT: HEENT. No thyroid enlargement. No JVD. Neck: Supple without restrictions. RESPIRATORY: Clear to auscultation and percussion to the bases. No CVAT. CARDIOVASC: RRR without murmur/rub. GASTROINT: soft, non-tender, without masses or organomegaly   MUSCULOSKEL: no joint tenderness, deformity or swelling       EXTREMITIES: extremities normal, atraumatic, no cyanosis or edema   PELVIC: Exam chaperoned by nurse. Normal appearing external genitalia. On speculum exam, normal appearing vagina and cervix. On bimanual exam, the cervix and uterus are normal size and mobile. No evidence of adnexal masses or nodularity. RECTAL: deferred   CAROLE SURVEY: No suspicious lymphadenopathy or edema noted. NEURO: Grossly intact. No acute deficit.        Lab Date as available:    Lab Results   Component Value Date/Time    WBC 11.5 (H) 07/13/2021 05:59 AM    HGB 11.5 07/13/2021 05:59 AM    HCT 35.0 07/13/2021 05:59 AM    PLATELET 976 81/65/6722 05:59 AM    MCV 95.1 07/13/2021 05:59 AM     Lab Results   Component Value Date/Time    Sodium 135 (L) 07/13/2021 05:59 AM    Potassium 3.8 07/13/2021 05:59 AM    Chloride 102 07/13/2021 05:59 AM    CO2 26 07/13/2021 05:59 AM    Anion gap 7 07/13/2021 05:59 AM    Glucose 111 (H) 07/13/2021 05:59 AM    BUN 10 07/13/2021 05:59 AM    Creatinine 0.65 07/13/2021 05:59 AM    BUN/Creatinine ratio 15 07/13/2021 05:59 AM    GFR est AA >60 07/13/2021 05:59 AM    GFR est non-AA >60 07/13/2021 05:59 AM    Calcium 8.3 (L) 07/13/2021 05:59 AM         IMPRESSION/PLAN:    Ms. Elisabeth Del Cid is a 72 y.o. female with a working diagnosis of complex endometrial hyperplasia with atypia. On 7/12/2021 underwent Total laparoscopic hysterectomy with bilateral salpingo-oophorectomy, Bilateral sentinel lymph node mapping and biopsy, lysis of adhesions. Final pathology consistent with Stage Ia, FIGO Grade 1 endometrial cancer. 1mm myometrial invasion (<50%). Negative LVSI. Negative SLN. 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 Reports doing well since surgery. Based on GOG 99 and PORTEC, no further treatment indicated. RTC in 4 weeks for postoperative visit. Will plan for q3 month surveillance at that time. All questions and concerns were addressed with the patient and she is comfortable with the plan. An electronic signature was used to authenticate this note.      Jd Dinh MD    Pursuant to the emergency dinner (eat dinner and then stay busy after it)    Additionally--  She is interested in GLP-1 trial; placing order  Goals--continue with current food plan, goal for 5000 steps daily, weigh weekly  Return in about 2 mo      Time: approx 37 min spent on evaluation, management, counseling, education, & motivational interviewing during visit coupled with pre-visit prep and post visit follow up/charting    Sincerely,    Luis Slade MD   declaration unde the Johnathan [employer] Act and the [facility], 52 waiver authority and the [employer] Act, this Virtual Visit was conducted, with patient's consent, to reduce the patient's risk of exposure to COVID-19 and provide continuity of care for an established patient. Patient identification was verified at the start of the visit: Yes    Services were provided through a video synchronous discussion virtually to substitute for in-person clinic visit. Patient was at her individual home, while the provider was in his/her respective office.     I spent at least 25 minutes with this established patient, and >50% of the time was spent counseling and/or coordinating care regarding pathology discussion, surveillance    Falguni Putnam MD

## 2021-09-13 NOTE — PROGRESS NOTES
Post op Visit, surgery was on 7/12/2021    1. Have you been to the ER, urgent care clinic since your last visit? Hospitalized since your last visit?  no    2. Have you seen or consulted any other health care providers outside of the [address] [place] [address] since your last visit? Include any pap smears or colon screening.    no

## 2021-09-14 ENCOUNTER — OFFICE VISIT (OUTPATIENT)
Dept: GYNECOLOGY | Age: 66
End: 2021-09-14
Payer: MEDICARE

## 2021-09-14 VITALS
SYSTOLIC BLOOD PRESSURE: 160 MMHG | WEIGHT: 201.8 LBS | DIASTOLIC BLOOD PRESSURE: 84 MMHG | HEIGHT: 65 IN | HEART RATE: 60 BPM | BODY MASS INDEX: 33.62 KG/M2

## 2021-09-14 DIAGNOSIS — C54.1 ENDOMETRIAL CANCER (HCC): Primary | ICD-10-CM

## 2021-09-14 PROCEDURE — 99024 POSTOP FOLLOW-UP VISIT: CPT | Performed by: OBSTETRICS & GYNECOLOGY

## 2021-09-14 NOTE — PROGRESS NOTES
[address] [place] [address] Mathias Moritz 277, 0950 [facility] [phone]  F [phone]    Office Note  Patient ID:  Name:  Arcadio Penny  MRN:  [id number]  :  1955/65 y.o. Date:  2021      HISTORY OF PRESENT ILLNESS:  Ms. Arcadio Penny is a 72 y.o. female with a working diagnosis of complex endometrial hyperplasia with atypia. On 2021 underwent Total laparoscopic hysterectomy with bilateral salpingo-oophorectomy, Bilateral sentinel lymph node mapping and biopsy, lysis of adhesions. Final pathology consistent with Stage Ia, FIGO Grade 1 endometrial cancer. 1mm myometrial invasion (<50%). Negative LVSI. Negative SLN. Presents today for postoperative visit. Doing well without complaints. Initial History:  Ms. Arcadio Penny is a 72 y.o. postmenopausal female who presents as a new patient from Dr. Melanie Winkler for complex endometrial hyperplasia with atypia. The patient reports life stressors last summer, and during all of that she reports having a light period in 2020. She presented to her PCPs office in 2020 per patient and reported that she had a normal pap smear and no further workup was done. She later presented in 2021 to her PCP and saw her doctor rather than the NP who recommended a referral to an Juda. She ultimately saw Dr. Melanie Winkler and underwent an endometrial biopsy on 2021 consistent with complex endometrial hyperplasia with atypia. Reports very minimal spotting now. Denies pelvic or abdominal pain/bloating. Denies CP or SOB. Denies nausea or vomiting. Denies change in appetite or bowel habits. Pertinent PMH/PSH: Smoker, HTN, HPL      Active, no restrictions. Pathology Review:  2021:  * * *FINAL PATHOLOGIC DIAGNOSIS* * *  1.  Uterus (fragmented), cervix, bilateral fallopian tubes and ovaries;   simple hysterectomy, BSO:        Endometrium: Endometrioid adenocarcinoma, FIGO grade 1, see synoptic   report        Myometrium: Leiomyomas (228 g specimen)        Serosa: Fibrous serosal adhesions involving uterus and adnexa        2.  Right pelvic sentinel lymph node, biopsy:        One lymph node, negative for metastatic carcinoma, levels and   cytokeratin stain examined (0/1)  3.  Left pelvic sentinel lymph node, biopsy:        One lymph node, negative for metastatic carcinoma, levels and   cytokeratin stain examined (0/1)  ENDOMETRIUM    SPECIMEN       Procedure: Total hysterectomy and bilateral salpingo-oophorectomy       Specimen Integrity: Fragmented    TUMOR       Histologic Type: Endometrioid carcinoma, NOS       Histologic Grade: FIGO grade 1       Myometrial Invasion: Present; exact percentage invasion cannot be   determined           Reason Percentage of Invasion Cannot be Determined: Myometrial   thickness cannot             be determined: Specimen fragmented           Depth of invasion: 1 mm           Estimated Percentage of Myometrial Invasion: Less than 50%       Uterine Serosa Involvement: Not identified       Lower Uterine Segment Involvement: Not identified       Cervical Stromal Involvement: Not identified       Other Tissue / Organ Involvement: Not identified       Peritoneal Ascitic Fluid: Not submitted / unknown       Lymphovascular Invasion: Not identified    MARGINS       Margins: Not applicable    LYMPH NODES       Lymph Node Status:  All lymph nodes negative for tumor cells           Total Number of Pelvic Nodes Examined: 2           Number of Pelvic [place] Nodes Examined: 2           Total Number of Para-aortic Nodes Examined: 0    PATHOLOGIC STAGE CLASSIFICATION (pTNM, AJCC 8th Edition)       Primary Tumor (pT): pT1a       Regional Lymph Nodes Modifier: (sn)       Regional Lymph Nodes (pN): pN0    FIGO STAGE       FIGO Stage: IA    ADDITIONAL FINDINGS       Additional Findings: Benign endometrial polyp  Assessment of mismatch repair and estrogen receptor pending, please see   addendum 5/11/2021:  FINAL PATHOLOGIC DIAGNOSIS   Endometrium, biopsy:   Complex endometrial hyperplasia with architectural atypia, 2 mm fragment (limited tissue)       ROS:  A comprehensive review of systems was negative except for that written in the History of Present Illness. , 10 point ROS    OB/GYN ROS:  Per HPI    ECOG ndGndrndanddndend:nd nd2nd Problem List:  Patient Active Problem List    Diagnosis Date Noted    Endometrial cancer ([address] 07/12/2021    EIN (endometrial intraepithelial neoplasia) 05/31/2021    Vitamin D deficiency 04/17/2018    Smoking 04/17/2018    Obesity (BMI 30.0-34.9) 08/10/2016    HTN (hypertension) 12/17/2012    Mixed hyperlipidemia 12/17/2012     PMH:  Past Medical History:   Diagnosis Date    Complex endometrial hyperplasia     COVID-19 vaccine series completed 3/22/21,  4/23/21    MODERNA    Herpes     High cholesterol     Hypercholesterolemia     Hypertension     Post-menopausal bleeding       PSH:  Past Surgical History:   Procedure Laterality Date    HX COLONOSCOPY        Social History:  Social History     Tobacco Use    Smoking status: Current Every Day Smoker     Packs/day: 0.30     Years: 40.00     Pack years: 12.00     Types: Cigarettes    Smokeless tobacco: Never Used   Substance Use Topics    Alcohol use: Yes     Alcohol/week: 0.0 standard drinks     Comment: Socially      Family History:  Family History   Problem Relation Age of Onset    Alzheimer Mother     Hypertension Sister     Asthma Brother     Cancer Maternal Aunt         bone ca    Hypertension Brother     Anesth Problems Neg Hx       Medications: (reviewed)  Current Outpatient Medications   Medication Sig    simvastatin (ZOCOR) 20 mg tablet TAKE 1 TABLET BY MOUTH AT  NIGHT    aspirin delayed-release 81 mg tablet Take 81 mg by mouth daily. Pt stated she takes couple times a week    cyanocobalamin (Vitamin B-12) 1,000 mcg tablet Take 1,000 mcg by mouth daily.     ascorbic acid, vitamin C, (Vitamin C) 500 mg tablet Take 500 mg by mouth every other day.  cholecalciferol (Vitamin D3) 25 mcg (1,000 unit) cap Take 2,000 Units by mouth every other day.  bisoprolol-hydroCHLOROthiazide (ZIAC) 5-6.25 mg per tablet TAKE 1 TABLET BY MOUTH ONCE DAILY IN THE MORNING    hydroCHLOROthiazide (HYDRODIURIL) 25 mg tablet Take 1 Tablet by mouth daily. DC HCTZ 12.5 mg    Cetirizine 10 mg cap Take 10 mg by mouth Every morning.  fluticasone propionate (Flonase Allergy Relief) 50 mcg/actuation nasal spray 1 [place] by Both Nostrils route daily.  calcium carbonate (TUMS) 200 mg calcium (500 mg) chew Take 1 Tab by mouth daily as needed.  valACYclovir (VALTREX) 500 mg tablet Take  by mouth two (2) times daily as needed. No current facility-administered medications for this visit. Allergies: (reviewed)  No Known Allergies       OBJECTIVE:  Physical Exam:  Visit Vitals  BP (!) 160/84 (BP 1 Location: Left arm, BP Patient Position: Sitting)   Pulse 60   Ht 5' 4.5\" (1.638 m)   Wt 201 lb 12.8 oz (91.5 kg)   LMP 10/26/2020   BMI 34.10 kg/m²      General: Alert and oriented. No acute distress. Well-nourished  HEENT: No thyroid enlargment. Neck supple without restrictions. Sclera normal. Normal occular motion. Moist mucous membranes. Lymphatics: No evidence of axillary, cervical, or subclavicular adenopathy. Respiratory: clear to auscultation and percussion to the bases. No CVAT. Cardiovascular: regular rate and rhythm. No murmurs, rubs, or gallops. Gastrointestinal: soft, non-tender, non-distended, no masses or organomegaly. Well-healed incision. Musculoskeletal: normal gait. No joint tenderness, deformity or swelling. No muscular tenderness. Extremities: extremities normal, atraumatic, no cyanosis or edema. Pelvic: exam chaperoned by nurse. Normal appearing external genitalia. On speculum exam, the vagina is atrophic. The uterus and cervix are surgically absent.  No evidence of masses or nodularity on bimanual exam. Deferred rectovaginal exam.   Neuro: Grossly intact. Normal gait and movement. No acute deficit  Skin: No evidence of rashes or skin changes. IMPRESSION/PLAN:    Ms. Ewa Esteban is a 72 y.o. female with a working diagnosis of complex endometrial hyperplasia with atypia. On 7/12/2021 underwent Total laparoscopic hysterectomy with bilateral salpingo-oophorectomy, Bilateral sentinel lymph node mapping and biopsy, lysis of adhesions. Final pathology consistent with Stage Ia, FIGO Grade 1 endometrial cancer. 1mm myometrial invasion (<50%). Negative LVSI. Negative SLN. Problems:     Patient Active Problem List    Diagnosis Date Noted    Endometrial cancer ([facility] 07/12/2021    EIN (endometrial intraepithelial neoplasia) 05/31/2021    Vitamin D deficiency 04/17/2018    Smoking 04/17/2018    Obesity (BMI 30.0-34.9) 08/10/2016    HTN (hypertension) 12/17/2012    Mixed hyperlipidemia 12/17/2012       Reviewed patient's course to date, including her surgical pathology consistent with Stage Ia, FIGO Grade 1 endometrial cancer. Reports doing well since surgery. Based on GOG 99 and PORTEC, no further treatment indicated. Healing well from surgery. RTC in 3 months for continued surveillance. Will follow-up on methylation of MLH-1 testing as she has loss of expression of PMS-2. All questions and concerns were addressed with the patient and she is comfortable with the plan. An electronic signature was used to authenticate this note.      Gisela Bae MD

## 2021-09-21 DIAGNOSIS — E78.2 MIXED HYPERLIPIDEMIA: ICD-10-CM

## 2021-09-21 DIAGNOSIS — I10 ESSENTIAL HYPERTENSION: ICD-10-CM

## 2021-09-21 RX ORDER — SIMVASTATIN 20 MG/1
TABLET, FILM COATED ORAL
Qty: 90 TABLET | Refills: 1 | Status: SHIPPED | OUTPATIENT
Start: 2021-09-21 | End: 2022-03-30 | Stop reason: SDUPTHER

## 2021-09-21 RX ORDER — BISOPROLOL FUMARATE AND HYDROCHLOROTHIAZIDE 5; 6.25 MG/1; MG/1
TABLET ORAL
Qty: 90 TABLET | Refills: 1 | Status: SHIPPED | OUTPATIENT
Start: 2021-09-21 | End: 2022-03-14 | Stop reason: SDUPTHER

## 2021-11-17 ENCOUNTER — OFFICE VISIT (OUTPATIENT)
Dept: INTERNAL MEDICINE CLINIC | Age: 66
End: 2021-11-17
Payer: MEDICARE

## 2021-11-17 VITALS
HEART RATE: 56 BPM | SYSTOLIC BLOOD PRESSURE: 142 MMHG | OXYGEN SATURATION: 98 % | HEIGHT: 64 IN | TEMPERATURE: 98.3 F | RESPIRATION RATE: 16 BRPM | DIASTOLIC BLOOD PRESSURE: 84 MMHG | WEIGHT: 198 LBS | BODY MASS INDEX: 33.8 KG/M2

## 2021-11-17 DIAGNOSIS — E78.2 MIXED HYPERLIPIDEMIA: ICD-10-CM

## 2021-11-17 DIAGNOSIS — R73.03 PREDIABETES: ICD-10-CM

## 2021-11-17 DIAGNOSIS — B37.2 CANDIDAL INTERTRIGO: ICD-10-CM

## 2021-11-17 DIAGNOSIS — E55.9 VITAMIN D DEFICIENCY: ICD-10-CM

## 2021-11-17 DIAGNOSIS — Z71.89 ACP (ADVANCE CARE PLANNING): ICD-10-CM

## 2021-11-17 DIAGNOSIS — D72.829 LEUKOCYTOSIS, UNSPECIFIED TYPE: ICD-10-CM

## 2021-11-17 DIAGNOSIS — I10 ESSENTIAL HYPERTENSION: Primary | ICD-10-CM

## 2021-11-17 DIAGNOSIS — Z00.00 MEDICARE ANNUAL WELLNESS VISIT, INITIAL: ICD-10-CM

## 2021-11-17 DIAGNOSIS — Z23 ENCOUNTER FOR IMMUNIZATION: ICD-10-CM

## 2021-11-17 DIAGNOSIS — C54.1 ENDOMETRIAL CA (HCC): ICD-10-CM

## 2021-11-17 DIAGNOSIS — Z12.31 ENCOUNTER FOR SCREENING MAMMOGRAM FOR MALIGNANT NEOPLASM OF BREAST: ICD-10-CM

## 2021-11-17 PROCEDURE — G8754 DIAS BP LESS 90: HCPCS | Performed by: INTERNAL MEDICINE

## 2021-11-17 PROCEDURE — 90732 PPSV23 VACC 2 YRS+ SUBQ/IM: CPT | Performed by: INTERNAL MEDICINE

## 2021-11-17 PROCEDURE — G8417 CALC BMI ABV UP PARAM F/U: HCPCS | Performed by: INTERNAL MEDICINE

## 2021-11-17 PROCEDURE — 99497 ADVNCD CARE PLAN 30 MIN: CPT | Performed by: INTERNAL MEDICINE

## 2021-11-17 PROCEDURE — G9899 SCRN MAM PERF RSLTS DOC: HCPCS | Performed by: INTERNAL MEDICINE

## 2021-11-17 PROCEDURE — 1101F PT FALLS ASSESS-DOCD LE1/YR: CPT | Performed by: INTERNAL MEDICINE

## 2021-11-17 PROCEDURE — 3017F COLORECTAL CA SCREEN DOC REV: CPT | Performed by: INTERNAL MEDICINE

## 2021-11-17 PROCEDURE — G0438 PPPS, INITIAL VISIT: HCPCS | Performed by: INTERNAL MEDICINE

## 2021-11-17 PROCEDURE — G8536 NO DOC ELDER MAL SCRN: HCPCS | Performed by: INTERNAL MEDICINE

## 2021-11-17 PROCEDURE — 99214 OFFICE O/P EST MOD 30 MIN: CPT | Performed by: INTERNAL MEDICINE

## 2021-11-17 PROCEDURE — G0009 ADMIN PNEUMOCOCCAL VACCINE: HCPCS | Performed by: INTERNAL MEDICINE

## 2021-11-17 PROCEDURE — G8753 SYS BP > OR = 140: HCPCS | Performed by: INTERNAL MEDICINE

## 2021-11-17 PROCEDURE — G8510 SCR DEP NEG, NO PLAN REQD: HCPCS | Performed by: INTERNAL MEDICINE

## 2021-11-17 PROCEDURE — 1090F PRES/ABSN URINE INCON ASSESS: CPT | Performed by: INTERNAL MEDICINE

## 2021-11-17 PROCEDURE — G8427 DOCREV CUR MEDS BY ELIG CLIN: HCPCS | Performed by: INTERNAL MEDICINE

## 2021-11-17 PROCEDURE — G8399 PT W/DXA RESULTS DOCUMENT: HCPCS | Performed by: INTERNAL MEDICINE

## 2021-11-17 RX ORDER — CLOTRIMAZOLE AND BETAMETHASONE DIPROPIONATE 10; .64 MG/G; MG/G
CREAM TOPICAL 2 TIMES DAILY
Qty: 30 G | Refills: 0 | Status: SHIPPED | OUTPATIENT
Start: 2021-11-17 | End: 2022-03-30 | Stop reason: ALTCHOICE

## 2021-11-17 NOTE — PATIENT INSTRUCTIONS

## 2021-11-17 NOTE — PROGRESS NOTES
Health Maintenance Due   Topic Date Due    Shingrix Vaccine Age 49> (1 of 2) Never done    Pneumococcal 65+ years (2 of 2 - PPSV23) 11/18/2020    Medicare Yearly Exam  Never done    Lipid Screen  06/30/2021    COVID-19 Vaccine (3 - Booster for [place] series) 10/23/2021       Chief Complaint   Patient presents with    Annual Wellness Visit    Depression    Rash    Hypertension       1. Have you been to the ER, urgent care clinic since your last visit? Hospitalized since your last visit? Yes. [place] , Hysterectomy, 7/2021  2. Have you seen or consulted any other health care providers outside of the [address] [place] [address] since your last visit? Include any pap smears or colon screening. No    3) Do you have an Advance Directive on file? no    4) Are you interested in receiving information on Advance Directives? NO      Patient is accompanied by self I have received verbal consent from [facility] to discuss any/all medical information while they are present in the room.

## 2021-11-17 NOTE — PROGRESS NOTES
This is an Initial Medicare Annual Wellness Exam (AWV) (Performed 12 months after IPPE or effective date of Medicare Part B enrollment, Once in a lifetime)    I have reviewed the patient's medical history in detail and updated the computerized patient record. Assessment/Plan   Education and counseling provided:  Are appropriate based on today's review and evaluation  End-of-Life planning (with patient's consent)  Screening Mammography  Bone mass measurement (DEXA)  Screening for glaucoma         Depression Risk Factor Screening     3 most recent PHQ Screens 11/17/2021   Little interest or pleasure in doing things Several days   Feeling down, depressed, irritable, or hopeless Several days   Total Score PHQ 2 2       Alcohol Risk Screen    Do you average more than 1 drink per night or more than 7 drinks a week:  No    On any one occasion in the past three months have you have had more than 3 drinks containing alcohol:  No         Functional Ability and Level of Safety    Hearing: Hearing is good. Activities of Daily Living: The home contains: no safety equipment. Patient does total self care     Ambulation: with no difficulty      Fall Risk:  Fall Risk Assessment, last 12 mths 11/17/2021   Able to walk? Yes   Fall in past 12 months? 0   Do you feel unsteady?  0   Are you worried about falling 0      Abuse Screen:  Patient is not abused       Cognitive Screening    Has your family/caregiver stated any concerns about your memory: no     Cognitive Screening: Normal - MMSE (Mini Mental Status Exam)    Health Maintenance Due     Health Maintenance Due   Topic Date Due    Shingrix Vaccine Age 49> (1 of 2) Never done    Pneumococcal 65+ years (2 of 2 - PPSV23) 11/18/2020    Lipid Screen  06/30/2021    COVID-19 Vaccine (3 - Booster for [place]hil Sites series) 10/23/2021       Patient Care Team   Patient Care Team:  Mary Hernandez MD as PCP - General (Internal Medicine)  Mary Hernandez MD as PCP - [facility] Provider  Erik Balbuena RN as Ambulatory Care Manager (Internal Medicine)    History     Patient Active Problem List   Diagnosis Code    HTN (hypertension) I10    Mixed hyperlipidemia E78.2    Obesity (BMI 30.0-34. 9) E66.9    Vitamin D deficiency E55.9    Smoking F17.200    EIN (endometrial intraepithelial neoplasia) N85.02    Endometrial cancer (HCC) C54.1     Past Medical History:   Diagnosis Date    Complex endometrial hyperplasia     COVID-19 vaccine series completed 3/22/21,  4/23/21    MODERNA    Herpes     High cholesterol     Hypercholesterolemia     Hypertension     Post-menopausal bleeding       Past Surgical History:   Procedure Laterality Date    HX COLONOSCOPY      HX GYN      HX HYSTERECTOMY  07/2021     Current Outpatient Medications   Medication Sig Dispense Refill    hydroCHLOROthiazide (HYDRODIURIL) 25 mg tablet TAKE 1 TABLET BY MOUTH  DAILY DC HCTZ 12.5 MG 90 Tablet 1    bisoprolol-hydroCHLOROthiazide (ZIAC) 5-6.25 mg per tablet TAKE 1 TABLET BY MOUTH ONCE DAILY IN THE MORNING 90 Tablet 1    simvastatin (ZOCOR) 20 mg tablet TAKE 1 TABLET BY MOUTH AT  NIGHT 90 Tablet 1    aspirin delayed-release 81 mg tablet Take 81 mg by mouth daily. Pt stated she takes couple times a week      cyanocobalamin (Vitamin B-12) 1,000 mcg tablet Take 1,000 mcg by mouth daily.  ascorbic acid, vitamin C, (Vitamin C) 500 mg tablet Take 500 mg by mouth every other day.  cholecalciferol (Vitamin D3) 25 mcg (1,000 unit) cap Take 2,000 Units by mouth every other day.  Cetirizine 10 mg cap Take 10 mg by mouth Every morning.  fluticasone propionate (Flonase Allergy Relief) 50 mcg/actuation nasal spray 1 [place] by Both Nostrils route daily.  calcium carbonate (TUMS) 200 mg calcium (500 mg) chew Take 1 Tab by mouth daily as needed.  valACYclovir (VALTREX) 500 mg tablet Take  by mouth two (2) times daily as needed.  (Patient not taking: Reported on 11/17/2021)       No Known Allergies    Family History   Problem Relation Age of Onset   [facility] Alzheimer's Disease Mother     Hypertension Sister     Asthma Brother     Cancer Maternal Aunt         bone ca    Hypertension Brother     Anesth Problems Neg Hx      Social History     Tobacco Use    Smoking status: Current Every Day Smoker     Packs/day: 0.30     Years: 40.00     Pack years: 12.00     Types: Cigarettes    Smokeless tobacco: Never Used   Substance Use Topics    Alcohol use:  Yes     Alcohol/week: 0.0 standard drinks     Comment: Yulia Rose MD

## 2021-11-17 NOTE — PROGRESS NOTES
HISTORY OF PRESENT ILLNESS  Becca Miles is a 72 y.o. female here for follow up. She was diagnosed with endometrial cancer. JUAN/BSO done. Treatment done. She will follow up with the GYN oncologist yearly. Feeling better. Has hypertension, compliant with medication. Denies chest pain palpitation shortness of breath  Need lab work. Has elevated lipid. on statin. no myalgia. Noticed to have rash on left-sided armpit. Slightest dark discoloration, itchy. Topical antibiotic ointment not helping her. Mammogram and bone density up-to-date. Need pneumonia shot  Hypertension     Cholesterol Problem    Obesity    Depression    Rash   Associated symptoms include itching. Review of Systems   Constitutional: Negative. HENT: Negative. Eyes: Negative. Respiratory: Negative. Cardiovascular: Negative. Gastrointestinal: Negative. Genitourinary: Positive for vaginal bleeding. Musculoskeletal: Negative. Negative for falls. Skin: Positive for itching and rash. Neurological: Negative. Endo/Heme/Allergies: Negative. Psychiatric/Behavioral: Positive for depression. Negative for suicidal ideas. Physical Exam  Constitutional:       General: She is not in acute distress. Appearance: Normal appearance. She is well-developed. She is obese. HENT:      Head: Normocephalic and atraumatic. Neck:      Thyroid: No thyromegaly. Vascular: No JVD. Cardiovascular:      Rate and Rhythm: Normal rate and regular rhythm. Pulses: Normal pulses. Heart sounds: Normal heart sounds. Pulmonary:      Effort: Pulmonary effort is normal. No respiratory distress. Breath sounds: Normal breath sounds. No wheezing. Abdominal:      General: Abdomen is flat. Bowel sounds are normal. There is no distension. Palpations: Abdomen is soft. Tenderness: There is no abdominal tenderness. Comments: Bowel sounds slightly hyperactive.    Musculoskeletal:         General: Normal range of motion. Cervical back: Normal range of motion and neck supple. Skin:     Comments: Left armpit: Approximately 2 x 5 cm area of dark discoloration with elevated blood pressure, slightly itchy. Neurological:      Mental Status: She is alert. Motor: No abnormal muscle tone. Psychiatric:         Mood and Affect: Mood normal.         Behavior: Behavior normal.         ASSESSMENT and PLAN  Diagnoses and all orders for this visit:    1. Essential hypertension    Stable blood pressure. On hydrochlorothiazide and Ziac. Will check,  -     CBC WITH AUTOMATED DIFF  -     METABOLIC PANEL, COMPREHENSIVE    2. Mixed hyperlipidemia    On simvastatin. Will check,  -     METABOLIC PANEL, COMPREHENSIVE  -     LIPID PANEL    3. Endometrial ca [facility])  Recently diagnosed, JUAN/BSO done. Treatment done, he will follow up annual surveillance with Dr. Sergio Davidson. 4. Medicare annual wellness visit, initial    5. ACP (advance care planning)    6. Candidal intertrigo    We will give,  -     clotrimazole-betamethasone (LOTRISONE) topical cream; Apply  to affected area two (2) times a day. 7. Leukocytosis, unspecified type    Had abnormal CBC. Will repeat,  -     CBC WITH AUTOMATED DIFF    8. Prediabetes     low-carb diet,  -     HEMOGLOBIN A1C WITH EAG    9. Vitamin D deficiency  -     VITAMIN D, 25 HYDROXY    10. Encounter for screening mammogram for malignant neoplasm of breast  -     YARELI MAMMO BI SCREENING INCL CAD; Future    11. Encounter for immunization  -     PNEUMOCOCCAL POLYSACCHARIDE VACCINE, 23-VALENT, ADULT OR IMMUNOSUPPRESSED PT DOSE,    Discussed expected course/resolution/complications of diagnosis in detail with patient. Medication risks/benefits/costs/interactions/alternatives discussed with patient. Discussed COVID-19 infection precaution with patient. Pt was given an after visit summary which includes diagnoses, current medications & vitals.    Pt expressed understanding with the diagnosis and plan.

## 2021-11-17 NOTE — PROGRESS NOTES
Jocelyne Belle is a 72 y.o. female  who presents for routine immunization(s). Patient denies any symptoms , reactions or allergies that would exclude them from being immunized today. Risks and adverse reactions were discussed. The patient/caregiver was provided the VIS and allotted time to read and ask questions prior to administration of vaccine. Patient voiced full understanding and signed Adult Immunization Consent form. All questions were addressed. Patient was observed for 10 min post injection. There were no reactions observed.         Results for orders placed or performed during the hospital encounter of 54/56/91   METABOLIC PANEL, BASIC   Result Value Ref Range    Sodium 135 (L) 136 - 145 mmol/L    Potassium 3.8 3.5 - 5.1 mmol/L    Chloride 102 97 - 108 mmol/L    CO2 26 21 - 32 mmol/L    Anion gap 7 5 - 15 mmol/L    Glucose 111 (H) 65 - 100 mg/dL    BUN 10 6 - 20 MG/DL    Creatinine 0.65 0.55 - 1.02 MG/DL    BUN/Creatinine ratio 15 12 - 20      GFR est AA >60 >60 ml/min/1.73m2    GFR est non-AA >60 >60 ml/min/1.73m2    Calcium 8.3 (L) 8.5 - 10.1 MG/DL   CBC W/O DIFF   Result Value Ref Range    WBC 11.5 (H) 3.6 - 11.0 K/uL    RBC 3.68 (L) 3.80 - 5.20 M/uL    HGB 11.5 11.5 - 16.0 g/dL    HCT 35.0 35.0 - 47.0 %    MCV 95.1 80.0 - 99.0 FL    MCH 31.3 26.0 - 34.0 PG    MCHC 32.9 30.0 - 36.5 g/dL    RDW 12.1 11.5 - 14.5 %    PLATELET 846 938 - 728 K/uL    MPV 10.6 8.9 - 12.9 FL    NRBC 0.0 0  WBC    ABSOLUTE NRBC 0.00 0.00 - 0.01 K/uL

## 2021-11-17 NOTE — ACP (ADVANCE CARE PLANNING)

## 2021-11-18 LAB
25(OH)D3+25(OH)D2 SERPL-MCNC: 24.4 NG/ML (ref 30–100)
ALBUMIN SERPL-MCNC: 4.5 G/DL (ref 3.8–4.8)
ALBUMIN/GLOB SERPL: 1.7 {RATIO} (ref 1.2–2.2)
ALP SERPL-CCNC: 106 IU/L (ref 44–121)
ALT SERPL-CCNC: 19 IU/L (ref 0–32)
AST SERPL-CCNC: 21 IU/L (ref 0–40)
BASOPHILS # BLD AUTO: 0 X10E3/UL (ref 0–0.2)
BASOPHILS NFR BLD AUTO: 0 %
BILIRUB SERPL-MCNC: 0.3 MG/DL (ref 0–1.2)
BUN SERPL-MCNC: 10 MG/DL (ref 8–27)
BUN/CREAT SERPL: 15 (ref 12–28)
CALCIUM SERPL-MCNC: 9.8 MG/DL (ref 8.7–10.3)
CHLORIDE SERPL-SCNC: 101 MMOL/L (ref 96–106)
CHOLEST SERPL-MCNC: 179 MG/DL (ref 100–199)
CO2 SERPL-SCNC: 26 MMOL/L (ref 20–29)
CREAT SERPL-MCNC: 0.65 MG/DL (ref 0.57–1)
EOSINOPHIL # BLD AUTO: 0.1 X10E3/UL (ref 0–0.4)
EOSINOPHIL NFR BLD AUTO: 2 %
ERYTHROCYTE [DISTWIDTH] IN BLOOD BY AUTOMATED COUNT: 12.4 % (ref 11.7–15.4)
EST. AVERAGE GLUCOSE BLD GHB EST-MCNC: 123 MG/DL
GLOBULIN SER CALC-MCNC: 2.6 G/DL (ref 1.5–4.5)
GLUCOSE SERPL-MCNC: 112 MG/DL (ref 65–99)
HBA1C MFR BLD: 5.9 % (ref 4.8–5.6)
HCT VFR BLD AUTO: 37.4 % (ref 34–46.6)
HDLC SERPL-MCNC: 53 MG/DL
HGB BLD-MCNC: 13 G/DL (ref 11.1–15.9)
IMM GRANULOCYTES # BLD AUTO: 0 X10E3/UL (ref 0–0.1)
IMM GRANULOCYTES NFR BLD AUTO: 0 %
IMP & REVIEW OF LAB RESULTS: NORMAL
LDLC SERPL CALC-MCNC: 112 MG/DL (ref 0–99)
LYMPHOCYTES # BLD AUTO: 1.3 X10E3/UL (ref 0.7–3.1)
LYMPHOCYTES NFR BLD AUTO: 32 %
MCH RBC QN AUTO: 31.7 PG (ref 26.6–33)
MCHC RBC AUTO-ENTMCNC: 34.8 G/DL (ref 31.5–35.7)
MCV RBC AUTO: 91 FL (ref 79–97)
MONOCYTES # BLD AUTO: 0.4 X10E3/UL (ref 0.1–0.9)
MONOCYTES NFR BLD AUTO: 9 %
NEUTROPHILS # BLD AUTO: 2.2 X10E3/UL (ref 1.4–7)
NEUTROPHILS NFR BLD AUTO: 57 %
PLATELET # BLD AUTO: 323 X10E3/UL (ref 150–450)
POTASSIUM SERPL-SCNC: 3.9 MMOL/L (ref 3.5–5.2)
PROT SERPL-MCNC: 7.1 G/DL (ref 6–8.5)
RBC # BLD AUTO: 4.1 X10E6/UL (ref 3.77–5.28)
SODIUM SERPL-SCNC: 141 MMOL/L (ref 134–144)
TRIGL SERPL-MCNC: 76 MG/DL (ref 0–149)
VLDLC SERPL CALC-MCNC: 14 MG/DL (ref 5–40)
WBC # BLD AUTO: 3.9 X10E3/UL (ref 3.4–10.8)

## 2021-11-19 NOTE — PROGRESS NOTES
LDL cholesterol mildly elevated. Recommend that patient watch diet for fatty foods and exercise as tolerated. HgbA1c 5.9, in pre-diabetic range. Watch diet for sugars and carbohydrates. Vitamin D level is low. Recommend OTC vitamin D3 2000 units po daily.

## 2022-01-10 NOTE — PROGRESS NOTES
[address] [place] [address] Mathias Moritz [address], [address] [facility]  P [phone]  F [phone]    Office Note  Patient ID:  Name:  Megan Ayala  MRN:  [id number]  :  1955/66 y.o. Date:  2022      HISTORY OF PRESENT ILLNESS:  Ms. Megan Aayla is a 77 y.o. female with a working diagnosis of complex endometrial hyperplasia with atypia. On 2021 underwent Total laparoscopic hysterectomy with bilateral salpingo-oophorectomy, Bilateral sentinel lymph node mapping and biopsy, lysis of adhesions. Final pathology consistent with Stage Ia, FIGO Grade 1 endometrial cancer. 1mm myometrial invasion (<50%). Negative LVSI. Negative SLN. Presents today for continued surveillance. Doing well without complaints. Denies vaginal bleeding/discharge, abdominal/pelvic pain, nausea, vomiting, constipation, diarrhea, CP, SOB, hematuria, hematochezia, change in appetite or bowel movements, bloating, fevers, chills, or urinary symptoms. Initial History:  Ms. Megan Ayala is a 77 y.o. postmenopausal female who presents as a new patient from Dr. Marley Ruiz for complex endometrial hyperplasia with atypia. The patient reports life stressors last summer, and during all of that she reports having a light period in 2020. She presented to her PCPs office in 2020 per patient and reported that she had a normal pap smear and no further workup was done. She later presented in 2021 to her PCP and saw her doctor rather than the NP who recommended a referral to an [place]. She ultimately saw Dr. Marley Ruiz and underwent an endometrial biopsy on 2021 consistent with complex endometrial hyperplasia with atypia. Reports very minimal spotting now. Denies pelvic or abdominal pain/bloating. Denies CP or SOB. Denies nausea or vomiting. Denies change in appetite or bowel habits. Pertinent PMH/PSH: Smoker, HTN, HPL      Active, no restrictions.      Pathology Review:  2021:  * * *FINAL PATHOLOGIC DIAGNOSIS* * *  1. Uterus (fragmented), cervix, bilateral fallopian tubes and ovaries;   simple hysterectomy, BSO:        Endometrium: Endometrioid adenocarcinoma, FIGO grade 1, see synoptic   report        Myometrium: Leiomyomas (228 g specimen)        Serosa: Fibrous serosal adhesions involving uterus and adnexa        2.  Right pelvic sentinel lymph node, biopsy:        One lymph node, negative for metastatic carcinoma, levels and   cytokeratin stain examined (0/1)  3.  Left pelvic sentinel lymph node, biopsy:        One lymph node, negative for metastatic carcinoma, levels and   cytokeratin stain examined (0/1)  ENDOMETRIUM    SPECIMEN       Procedure: Total hysterectomy and bilateral salpingo-oophorectomy       Specimen Integrity: Fragmented    TUMOR       Histologic Type: Endometrioid carcinoma, NOS       Histologic Grade: FIGO grade 1       Myometrial Invasion: Present; exact percentage invasion cannot be   determined           Reason Percentage of Invasion Cannot be Determined: Myometrial   thickness cannot             be determined: Specimen fragmented           Depth of invasion: 1 mm           Estimated Percentage of Myometrial Invasion: Less than 50%       Uterine Serosa Involvement: Not identified       Lower Uterine Segment Involvement: Not identified       Cervical Stromal Involvement: Not identified       Other Tissue / Organ Involvement: Not identified       Peritoneal Ascitic Fluid: Not submitted / unknown       Lymphovascular Invasion: Not identified    MARGINS       Margins: Not applicable    LYMPH NODES       Lymph Node Status:  All lymph nodes negative for tumor cells           Total Number of Pelvic Nodes Examined: 2           Number of Pelvic [place] Nodes Examined: 2           Total Number of Para-aortic Nodes Examined: 0    PATHOLOGIC STAGE CLASSIFICATION (pTNM, AJCC 8th Edition)       Primary Tumor (pT): pT1a       Regional Lymph Nodes Modifier: (sn)       Regional Lymph Nodes (pN): pN0    FIGO STAGE       FIGO Stage: IA    ADDITIONAL FINDINGS       Additional Findings: Benign endometrial polyp  Assessment of mismatch repair and estrogen receptor pending, please see   addendum     5/11/2021:  FINAL PATHOLOGIC DIAGNOSIS   Endometrium, biopsy:   Complex endometrial hyperplasia with architectural atypia, 2 mm fragment (limited tissue)       ROS:  A comprehensive review of systems was negative except for that written in the History of Present Illness. , 10 point ROS    OB/GYN ROS:  Per HPI    ECOG ndGndrndanddndend:nd nd2nd Problem List:  Patient Active Problem List    Diagnosis Date Noted    Endometrial cancer ([address] 07/12/2021    EIN (endometrial intraepithelial neoplasia) 05/31/2021    Vitamin D deficiency 04/17/2018    Smoking 04/17/2018    Obesity (BMI 30.0-34.9) 08/10/2016    HTN (hypertension) 12/17/2012    Mixed hyperlipidemia 12/17/2012     PMH:  Past Medical History:   Diagnosis Date    Complex endometrial hyperplasia     COVID-19 vaccine series completed 3/22/21,  4/23/21    MODERNA    Herpes     High cholesterol     Hypercholesterolemia     Hypertension     Post-menopausal bleeding       PSH:  Past Surgical History:   Procedure Laterality Date    HX COLONOSCOPY      HX GYN      HX HYSTERECTOMY  07/2021      Social History:  Social History     Tobacco Use    Smoking status: Current Every Day Smoker     Packs/day: 0.30     Years: 40.00     Pack years: 12.00     Types: Cigarettes    Smokeless tobacco: Never Used   Substance Use Topics    Alcohol use:  Yes     Alcohol/week: 0.0 standard drinks     Comment: Socially      Family History:  Family History   Problem Relation Age of Onset   Rustam Borges Alzheimer's Disease Mother     Hypertension Sister     Asthma Brother     Cancer Maternal Aunt         bone ca    Hypertension Brother     Anesth Problems Neg Hx       Medications: (reviewed)  Current Outpatient Medications   Medication Sig    clotrimazole-betamethasone (LOTRISONE) topical cream Apply  to affected area two (2) times a day.  hydroCHLOROthiazide (HYDRODIURIL) 25 mg tablet TAKE 1 TABLET BY MOUTH  DAILY DC HCTZ 12.5 MG    bisoprolol-hydroCHLOROthiazide (ZIAC) 5-6.25 mg per tablet TAKE 1 TABLET BY MOUTH ONCE DAILY IN THE MORNING    simvastatin (ZOCOR) 20 mg tablet TAKE 1 TABLET BY MOUTH AT  NIGHT    aspirin delayed-release 81 mg tablet Take 81 mg by mouth daily. Pt stated she takes couple times a week    cyanocobalamin (Vitamin B-12) 1,000 mcg tablet Take 1,000 mcg by mouth daily.  ascorbic acid, vitamin C, (Vitamin C) 500 mg tablet Take 500 mg by mouth every other day.  cholecalciferol (Vitamin D3) 25 mcg (1,000 unit) cap Take 2,000 Units by mouth every other day.  Cetirizine 10 mg cap Take 10 mg by mouth Every morning.  fluticasone propionate (Flonase Allergy Relief) 50 mcg/actuation nasal spray 1 [place] by Both Nostrils route daily.  calcium carbonate (TUMS) 200 mg calcium (500 mg) chew Take 1 Tab by mouth daily as needed. No current facility-administered medications for this visit. Allergies: (reviewed)  No Known Allergies       OBJECTIVE:  Physical Exam:  Visit Vitals  BP (!) 140/79 (BP 1 Location: Left arm, BP Patient Position: Sitting)   Pulse (!) 58   Ht 5' 4\" (1.626 m)   Wt 197 lb (89.4 kg)   LMP 10/26/2020   BMI 33.81 kg/m²      General: Alert and oriented. No acute distress. Well-nourished  HEENT: No thyroid enlargment. Neck supple without restrictions. Sclera normal. Normal occular motion. Moist mucous membranes. Lymphatics: No evidence of axillary, cervical, or subclavicular adenopathy. Respiratory: clear to auscultation and percussion to the bases. No CVAT. Cardiovascular: regular rate and rhythm. No murmurs, rubs, or gallops. Gastrointestinal: soft, non-tender, non-distended, no masses or organomegaly. Well-healed incision. Musculoskeletal: normal gait. No joint tenderness, deformity or swelling. No muscular tenderness. Extremities: extremities normal, atraumatic, no cyanosis or edema. Pelvic: exam chaperoned by nurse. Normal appearing external genitalia. On speculum exam, the vagina is atrophic. The uterus and cervix are surgically absent. No evidence of masses or nodularity on bimanual exam. Deferred rectovaginal exam.   Neuro: Grossly intact. Normal gait and movement. No acute deficit  Skin: No evidence of rashes or skin changes. IMPRESSION/PLAN:    Ms. Mendez Ma is a 77 y.o. female with a working diagnosis of complex endometrial hyperplasia with atypia. On 7/12/2021 underwent Total laparoscopic hysterectomy with bilateral salpingo-oophorectomy, Bilateral sentinel lymph node mapping and biopsy, lysis of adhesions. Final pathology consistent with Stage Ia, FIGO Grade 1 endometrial cancer. 1mm myometrial invasion (<50%). Negative LVSI. Negative SLN. 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 Problems:     Patient Active Problem List    Diagnosis Date Noted    Endometrial cancer ([facility] 07/12/2021    EIN (endometrial intraepithelial neoplasia) 05/31/2021    Vitamin D deficiency 04/17/2018    Smoking 04/17/2018    Obesity (BMI 30.0-34.9) 08/10/2016    HTN (hypertension) 12/17/2012    Mixed hyperlipidemia 12/17/2012       Reviewed patient's course to date. MARILEE on exam today. Reassured patient. RTC in 3 months for continued surveillance. Reviewed precautionary symptoms to return sooner. All questions and concerns were addressed with the patient and she is comfortable with the plan. An electronic signature was used to authenticate this note.      Shelby Colbert MD

## 2022-01-10 NOTE — PROGRESS NOTES
3 month follow up for endometrial cancer ,  pt reports no abnormal spotting or bleeding, pt states no questions or concerns for today's visit    1. Have you been to the ER, urgent care clinic since your last visit? Hospitalized since your last visit?  no    2. Have you seen or consulted any other health care providers outside of the [address] [place] [address] since your last visit? Include any pap smears or colon screening.    no

## 2022-01-11 ENCOUNTER — OFFICE VISIT (OUTPATIENT)
Dept: GYNECOLOGY | Age: 67
End: 2022-01-11
Payer: MEDICARE

## 2022-01-11 VITALS
DIASTOLIC BLOOD PRESSURE: 79 MMHG | SYSTOLIC BLOOD PRESSURE: 140 MMHG | HEIGHT: 64 IN | WEIGHT: 197 LBS | HEART RATE: 58 BPM | BODY MASS INDEX: 33.63 KG/M2

## 2022-01-11 DIAGNOSIS — C54.1 ENDOMETRIAL CANCER (HCC): Primary | ICD-10-CM

## 2022-01-11 PROCEDURE — G9899 SCRN MAM PERF RSLTS DOC: HCPCS | Performed by: OBSTETRICS & GYNECOLOGY

## 2022-01-11 PROCEDURE — G8417 CALC BMI ABV UP PARAM F/U: HCPCS | Performed by: OBSTETRICS & GYNECOLOGY

## 2022-01-11 PROCEDURE — 99214 OFFICE O/P EST MOD 30 MIN: CPT | Performed by: OBSTETRICS & GYNECOLOGY

## 2022-01-11 PROCEDURE — G8427 DOCREV CUR MEDS BY ELIG CLIN: HCPCS | Performed by: OBSTETRICS & GYNECOLOGY

## 2022-01-11 PROCEDURE — G8753 SYS BP > OR = 140: HCPCS | Performed by: OBSTETRICS & GYNECOLOGY

## 2022-01-11 PROCEDURE — G8399 PT W/DXA RESULTS DOCUMENT: HCPCS | Performed by: OBSTETRICS & GYNECOLOGY

## 2022-01-11 PROCEDURE — 3017F COLORECTAL CA SCREEN DOC REV: CPT | Performed by: OBSTETRICS & GYNECOLOGY

## 2022-01-11 PROCEDURE — 1090F PRES/ABSN URINE INCON ASSESS: CPT | Performed by: OBSTETRICS & GYNECOLOGY

## 2022-01-11 PROCEDURE — G8754 DIAS BP LESS 90: HCPCS | Performed by: OBSTETRICS & GYNECOLOGY

## 2022-01-11 PROCEDURE — G8432 DEP SCR NOT DOC, RNG: HCPCS | Performed by: OBSTETRICS & GYNECOLOGY

## 2022-01-11 PROCEDURE — G8536 NO DOC ELDER MAL SCRN: HCPCS | Performed by: OBSTETRICS & GYNECOLOGY

## 2022-01-11 PROCEDURE — 1101F PT FALLS ASSESS-DOCD LE1/YR: CPT | Performed by: OBSTETRICS & GYNECOLOGY

## 2022-03-08 ENCOUNTER — HOSPITAL ENCOUNTER (OUTPATIENT)
Dept: MAMMOGRAPHY | Age: 67
Discharge: HOME | End: 2022-03-08
Attending: INTERNAL MEDICINE
Payer: MEDICARE

## 2022-03-08 DIAGNOSIS — Z12.31 ENCOUNTER FOR SCREENING MAMMOGRAM FOR MALIGNANT NEOPLASM OF BREAST: ICD-10-CM

## 2022-03-08 PROCEDURE — 77067 SCR MAMMO BI INCL CAD: CPT

## 2022-03-14 ENCOUNTER — TELEPHONE (OUTPATIENT)
Dept: INTERNAL MEDICINE CLINIC | Age: 67
End: 2022-03-14

## 2022-03-14 DIAGNOSIS — I10 ESSENTIAL HYPERTENSION: ICD-10-CM

## 2022-03-14 RX ORDER — BISOPROLOL FUMARATE AND HYDROCHLOROTHIAZIDE 5; 6.25 MG/1; MG/1
TABLET ORAL
Qty: 90 TABLET | Refills: 1 | Status: SHIPPED | OUTPATIENT
Start: 2022-03-14 | End: 2022-06-27 | Stop reason: SDUPTHER

## 2022-03-14 NOTE — TELEPHONE ENCOUNTER
----- Message from [facility] sent at 3/14/2022  1:14 PM EDT -----  Subject: Refill Request    QUESTIONS  Name of Medication? bisoprolol-hydroCHLOROthiazide (ZIAC) 5-6.25 mg per   tablet  Patient-reported dosage and instructions? 1 tablet by mouth daily 5-6.25   mg  How many days do you have left? 22  Preferred Pharmacy? [email or web address] [facility]  Pharmacy phone number (if available)? [phone]  ---------------------------------------------------------------------------  --------------,  Name of Medication? simvastatin (ZOCOR) 20 mg tablet  Patient-reported dosage and instructions? 20 mg 1 tablet daily  How many days do you have left? 19  Preferred Pharmacy? [email or web address] [facility]  Pharmacy phone number (if available)? [phone]  ---------------------------------------------------------------------------  --------------,  Name of Medication? hydroCHLOROthiazide (HYDRODIURIL) 25 mg tablet  Patient-reported dosage and instructions? 25 mg 1 tablet daily  How many days do you have left? 16  [facility] [address] [place] [address]  Pharmacy phone number (if available)? [phone]  ---------------------------------------------------------------------------  --------------  CALL BACK INFO  What is the best way for the office to contact you? OK to leave message on   voicemail  Preferred Call Back Phone Number?  [phone]

## 2022-03-14 NOTE — TELEPHONE ENCOUNTER
----- Message from Katie Madrigal sent at 3/14/2022  1:10 PM EDT -----  Subject: Message to Provider    QUESTIONS  Information for Provider? Pt. Lita Posada is inquiring if an appt is   required for her medications refilled. Provider Mikki San.  ---------------------------------------------------------------------------  --------------  Albert SANZ  What is the best way for the office to contact you? OK to leave message on   voicemail  Preferred Call Back Phone Number? [phone]  ---------------------------------------------------------------------------  --------------  SCRIPT ANSWERS  Relationship to Patient?  Self

## 2022-03-19 PROBLEM — F17.200 SMOKING: Status: ACTIVE | Noted: 2018-04-17

## 2022-03-19 PROBLEM — E55.9 VITAMIN D DEFICIENCY: Status: ACTIVE | Noted: 2018-04-17

## 2022-03-19 PROBLEM — N85.02 EIN (ENDOMETRIAL INTRAEPITHELIAL NEOPLASIA): Status: ACTIVE | Noted: 2021-05-31

## 2022-03-20 PROBLEM — C54.1 ENDOMETRIAL CANCER (HCC): Status: ACTIVE | Noted: 2021-07-12

## 2022-03-30 ENCOUNTER — OFFICE VISIT (OUTPATIENT)
Dept: INTERNAL MEDICINE CLINIC | Age: 67
End: 2022-03-30
Payer: MEDICARE

## 2022-03-30 VITALS
WEIGHT: 195 LBS | HEART RATE: 55 BPM | HEIGHT: 64 IN | DIASTOLIC BLOOD PRESSURE: 94 MMHG | TEMPERATURE: 98.1 F | RESPIRATION RATE: 17 BRPM | OXYGEN SATURATION: 98 % | SYSTOLIC BLOOD PRESSURE: 130 MMHG | BODY MASS INDEX: 33.29 KG/M2

## 2022-03-30 DIAGNOSIS — E78.2 MIXED HYPERLIPIDEMIA: ICD-10-CM

## 2022-03-30 DIAGNOSIS — I10 ESSENTIAL HYPERTENSION: Primary | ICD-10-CM

## 2022-03-30 DIAGNOSIS — Z23 ENCOUNTER FOR IMMUNIZATION: ICD-10-CM

## 2022-03-30 DIAGNOSIS — M19.049 HAND ARTHRITIS: ICD-10-CM

## 2022-03-30 DIAGNOSIS — R73.03 PREDIABETES: ICD-10-CM

## 2022-03-30 DIAGNOSIS — M25.522 LEFT ELBOW PAIN: ICD-10-CM

## 2022-03-30 DIAGNOSIS — E55.9 VITAMIN D DEFICIENCY: ICD-10-CM

## 2022-03-30 PROCEDURE — G8510 SCR DEP NEG, NO PLAN REQD: HCPCS | Performed by: INTERNAL MEDICINE

## 2022-03-30 PROCEDURE — G9899 SCRN MAM PERF RSLTS DOC: HCPCS | Performed by: INTERNAL MEDICINE

## 2022-03-30 PROCEDURE — G8536 NO DOC ELDER MAL SCRN: HCPCS | Performed by: INTERNAL MEDICINE

## 2022-03-30 PROCEDURE — G8417 CALC BMI ABV UP PARAM F/U: HCPCS | Performed by: INTERNAL MEDICINE

## 2022-03-30 PROCEDURE — 1090F PRES/ABSN URINE INCON ASSESS: CPT | Performed by: INTERNAL MEDICINE

## 2022-03-30 PROCEDURE — 3017F COLORECTAL CA SCREEN DOC REV: CPT | Performed by: INTERNAL MEDICINE

## 2022-03-30 PROCEDURE — G8752 SYS BP LESS 140: HCPCS | Performed by: INTERNAL MEDICINE

## 2022-03-30 PROCEDURE — G8399 PT W/DXA RESULTS DOCUMENT: HCPCS | Performed by: INTERNAL MEDICINE

## 2022-03-30 PROCEDURE — 1101F PT FALLS ASSESS-DOCD LE1/YR: CPT | Performed by: INTERNAL MEDICINE

## 2022-03-30 PROCEDURE — G8755 DIAS BP > OR = 90: HCPCS | Performed by: INTERNAL MEDICINE

## 2022-03-30 PROCEDURE — 99214 OFFICE O/P EST MOD 30 MIN: CPT | Performed by: INTERNAL MEDICINE

## 2022-03-30 PROCEDURE — G8427 DOCREV CUR MEDS BY ELIG CLIN: HCPCS | Performed by: INTERNAL MEDICINE

## 2022-03-30 RX ORDER — HYDROCHLOROTHIAZIDE 25 MG/1
25 TABLET ORAL DAILY
Qty: 90 TABLET | Refills: 1 | Status: SHIPPED | OUTPATIENT
Start: 2022-03-30 | End: 2022-03-30 | Stop reason: SDUPTHER

## 2022-03-30 RX ORDER — HYDROCHLOROTHIAZIDE 25 MG/1
25 TABLET ORAL DAILY
Qty: 30 TABLET | Refills: 0 | Status: SHIPPED | OUTPATIENT
Start: 2022-03-30 | End: 2022-03-31 | Stop reason: SDUPTHER

## 2022-03-30 RX ORDER — DICLOFENAC SODIUM 10 MG/G
GEL TOPICAL
Qty: 100 G | Refills: 2 | Status: SHIPPED | OUTPATIENT
Start: 2022-03-30

## 2022-03-30 RX ORDER — SIMVASTATIN 20 MG/1
TABLET, FILM COATED ORAL
Qty: 90 TABLET | Refills: 1 | Status: SHIPPED | OUTPATIENT
Start: 2022-03-30 | End: 2022-03-31 | Stop reason: SDUPTHER

## 2022-03-30 NOTE — PROGRESS NOTES
HISTORY OF PRESENT ILLNESS  Iesha Perkins is a 77 y.o. female here for follow up. Report pain on the left elbow sometimes radiating to left forearm. Her hands get stiff and have some nodules in the hand. Hand hurts also. Has hypertension, compliant with medication. Denies chest pain palpitation shortness of breath. Needs refill on medicine. Has elevated lipid. on statin. no myalgia. Had endometrial cancer, she is cancer free right now. Seeing oncologist.  Esther Reyes and bone density up-to-date. Depression    Hypertension     Cholesterol Problem    Obesity    Elbow Pain         Review of Systems   Constitutional: Negative. HENT: Negative. Eyes: Negative. Respiratory: Negative. Cardiovascular: Negative. Gastrointestinal: Negative. Musculoskeletal: Positive for joint pain. Negative for falls. Skin: Negative. Neurological: Negative. Endo/Heme/Allergies: Negative. Psychiatric/Behavioral: Positive for depression. Negative for suicidal ideas. Physical Exam  Constitutional:       General: She is not in acute distress. Appearance: Normal appearance. She is well-developed. She is obese. Neck:      Thyroid: No thyromegaly. Vascular: No JVD. Cardiovascular:      Rate and Rhythm: Normal rate and regular rhythm. Pulses: Normal pulses. Heart sounds: Normal heart sounds. Pulmonary:      Effort: Pulmonary effort is normal. No respiratory distress. Breath sounds: Normal breath sounds. No wheezing. Abdominal:      General: Abdomen is flat. Bowel sounds are normal. There is no distension. Palpations: Abdomen is soft. Tenderness: There is no abdominal tenderness. Comments: Bowel sounds slightly hyperactive. Musculoskeletal:         General: Tenderness present. Normal range of motion. Cervical back: Normal range of motion and neck supple. Comments: Right hand: Small motion nodule present. Range of motion mildly restricted.   Left elbow: Point tenderness in the elbow. Range of motion okay. Neurological:      Mental Status: She is alert. Motor: No abnormal muscle tone. Psychiatric:         Mood and Affect: Mood normal.         Behavior: Behavior normal.         ASSESSMENT and PLAN  Diagnoses and all orders for this visit:    1. Essential hypertension    Stable blood pressure. On Ziac and hydrochlorothiazide. Will refill both. -     METABOLIC PANEL, COMPREHENSIVE  -     hydroCHLOROthiazide (HYDRODIURIL) 25 mg tablet; Take 1 Tablet by mouth daily. DC HCTZ 12.5 mg    2. Mixed hyperlipidemia    We will refill,  -     simvastatin (ZOCOR) 20 mg tablet; TAKE 1 TABLET BY MOUTH AT  NIGHT  -     METABOLIC PANEL, COMPREHENSIVE    3. Vitamin D deficiency    We will check,  -     VITAMIN D, 25 HYDROXY    4. Prediabetes    We will check,  -     HEMOGLOBIN A1C WITH EAG    5. Encounter for immunization  -     varicella-zoster recombinant, PF, (SHINGRIX) 50 mcg/0.5 mL susr injection; 0.5 mL by IntraMUSCular route once for 1 dose. 6. Hand arthritis    Garth nodules present. Advised to do hand exercise. Will give,  -     diclofenac (VOLTAREN) 1 % gel; Apply  to affected area two (2) times daily as needed for Pain. 7. Left elbow pain  -     diclofenac (VOLTAREN) 1 % gel; Apply  to affected area two (2) times daily as needed for Pain. Discussed expected course/resolution/complications of diagnosis in detail with patient. Medication risks/benefits/costs/interactions/alternatives discussed with patient. Discussed COVID-19 infection precaution with patient. Pt was given an after visit summary which includes diagnoses, current medications & vitals. Pt expressed understanding with the diagnosis and plan.

## 2022-03-30 NOTE — PROGRESS NOTES
Room 8     Identified pt with two pt identifiers(name and ). Reviewed record in preparation for visit and have obtained necessary documentation. All patient medications has been reviewed. Chief Complaint   Patient presents with    Hypertension    Cholesterol Problem    Obesity    Depression       3 most recent PHQ Screens 3/30/2022   Little interest or pleasure in doing things Several days   Feeling down, depressed, irritable, or hopeless Several days   Total Score PHQ 2 2     Abuse Screening Questionnaire 2021   Do you ever feel afraid of your partner? N   Are you in a relationship with someone who physically or mentally threatens you? N   Is it safe for you to go home? Y       Health Maintenance Due   Topic    Shingrix Vaccine Age 50> (1 of 2)    COVID-19 Vaccine (3 - Booster for Moderna series)         Health Maintenance Review: Patient reminded of \"due or due soon\" health maintenance. I have asked the patient to contact his/her primary care provider (PCP) for follow-up on his/her health maintenance. Vitals:    22 1105 22 1113   BP: (!) 134/94 (!) 130/94   Pulse: (!) 55    Resp: 17    Temp: 98.1 °F (36.7 °C)    TempSrc: Oral    SpO2: 98%    Weight: 195 lb (88.5 kg)    Height: 5' 4\" (1.626 m)    PainSc:   0 - No pain    LMP: 10/26/2020       Wt Readings from Last 3 Encounters:   22 195 lb (88.5 kg)   22 197 lb (89.4 kg)   21 198 lb (89.8 kg)     Temp Readings from Last 3 Encounters:   22 98.1 °F (36.7 °C) (Oral)   21 98.3 °F (36.8 °C) (Oral)   21 97.9 °F (36.6 °C)     BP Readings from Last 3 Encounters:   22 (!) 130/94   22 (!) 140/79   21 (!) 142/84     Pulse Readings from Last 3 Encounters:   22 (!) 55   22 (!) 58   21 (!) 56       Coordination of Care Questionnaire:   1) Have you been to an emergency room, urgent care, or hospitalized since your last visit?   no       2.  Have seen or consulted any other health care provider since your last visit? NO    Patient is accompanied by self I have received verbal consent from [facility] to discuss any/all medical information while they are present in the room.

## 2022-03-31 DIAGNOSIS — E78.2 MIXED HYPERLIPIDEMIA: ICD-10-CM

## 2022-03-31 DIAGNOSIS — I10 ESSENTIAL HYPERTENSION: ICD-10-CM

## 2022-03-31 LAB
25(OH)D3+25(OH)D2 SERPL-MCNC: 52 NG/ML (ref 30–100)
ALBUMIN SERPL-MCNC: 4.5 G/DL (ref 3.8–4.8)
ALBUMIN/GLOB SERPL: 1.6 {RATIO} (ref 1.2–2.2)
ALP SERPL-CCNC: 110 IU/L (ref 44–121)
ALT SERPL-CCNC: 17 IU/L (ref 0–32)
AST SERPL-CCNC: 19 IU/L (ref 0–40)
BILIRUB SERPL-MCNC: 0.4 MG/DL (ref 0–1.2)
BUN SERPL-MCNC: 13 MG/DL (ref 8–27)
BUN/CREAT SERPL: 20 (ref 12–28)
CALCIUM SERPL-MCNC: 9.8 MG/DL (ref 8.7–10.3)
CHLORIDE SERPL-SCNC: 99 MMOL/L (ref 96–106)
CO2 SERPL-SCNC: 25 MMOL/L (ref 20–29)
CREAT SERPL-MCNC: 0.66 MG/DL (ref 0.57–1)
EGFR: 97 ML/MIN/1.73
EST. AVERAGE GLUCOSE BLD GHB EST-MCNC: 120 MG/DL
GLOBULIN SER CALC-MCNC: 2.8 G/DL (ref 1.5–4.5)
GLUCOSE SERPL-MCNC: 110 MG/DL (ref 65–99)
HBA1C MFR BLD: 5.8 % (ref 4.8–5.6)
POTASSIUM SERPL-SCNC: 4.4 MMOL/L (ref 3.5–5.2)
PROT SERPL-MCNC: 7.3 G/DL (ref 6–8.5)
SODIUM SERPL-SCNC: 139 MMOL/L (ref 134–144)

## 2022-03-31 RX ORDER — SIMVASTATIN 20 MG/1
TABLET, FILM COATED ORAL
Qty: 30 TABLET | Refills: 0 | Status: SHIPPED | OUTPATIENT
Start: 2022-03-31 | End: 2022-04-05 | Stop reason: SDUPTHER

## 2022-03-31 RX ORDER — HYDROCHLOROTHIAZIDE 25 MG/1
25 TABLET ORAL DAILY
Qty: 30 TABLET | Refills: 0 | Status: SHIPPED | OUTPATIENT
Start: 2022-03-31 | End: 2022-04-05 | Stop reason: SDUPTHER

## 2022-04-05 DIAGNOSIS — E78.2 MIXED HYPERLIPIDEMIA: ICD-10-CM

## 2022-04-05 DIAGNOSIS — I10 ESSENTIAL HYPERTENSION: ICD-10-CM

## 2022-04-05 RX ORDER — SIMVASTATIN 20 MG/1
TABLET, FILM COATED ORAL
Qty: 30 TABLET | Refills: 0 | Status: SHIPPED | OUTPATIENT
Start: 2022-04-05 | End: 2022-10-31 | Stop reason: SDUPTHER

## 2022-04-05 RX ORDER — HYDROCHLOROTHIAZIDE 25 MG/1
25 TABLET ORAL DAILY
Qty: 30 TABLET | Refills: 0 | Status: SHIPPED | OUTPATIENT
Start: 2022-04-05 | End: 2022-08-17 | Stop reason: SDUPTHER

## 2022-04-05 NOTE — TELEPHONE ENCOUNTER
Requested Prescriptions     Pending Prescriptions Disp Refills    hydroCHLOROthiazide (HYDRODIURIL) 25 mg tablet 30 Tablet 0     Sig: Take 1 Tablet by mouth daily. DC HCTZ 12.5 mg    simvastatin (ZOCOR) 20 mg tablet 30 Tablet 0     Sig: TAKE 1 TABLET BY MOUTH AT  NIGHT     Patient will be using mail order for these scripts.   03/30/22 07/27/2022  [facility]

## 2022-04-11 NOTE — PROGRESS NOTES
3 month follow up for endometrial cancer ,  pt reports no abnormal spotting or bleeding, pt states no questions or concerns for today's visit    1. Have you been to the ER, urgent care clinic since your last visit? Hospitalized since your last visit?  no    2. Have you seen or consulted any other health care providers outside of the [address] [place] [address] since your last visit? Include any pap smears or colon screening.    no

## 2022-04-12 ENCOUNTER — OFFICE VISIT (OUTPATIENT)
Dept: GYNECOLOGY | Age: 67
End: 2022-04-12
Payer: MEDICARE

## 2022-04-12 VITALS — WEIGHT: 200 LBS | BODY MASS INDEX: 34.15 KG/M2 | HEIGHT: 64 IN

## 2022-04-12 DIAGNOSIS — C54.1 ENDOMETRIAL CANCER (HCC): Primary | ICD-10-CM

## 2022-04-12 PROCEDURE — G8756 NO BP MEASURE DOC: HCPCS | Performed by: OBSTETRICS & GYNECOLOGY

## 2022-04-12 PROCEDURE — G8417 CALC BMI ABV UP PARAM F/U: HCPCS | Performed by: OBSTETRICS & GYNECOLOGY

## 2022-04-12 PROCEDURE — 1090F PRES/ABSN URINE INCON ASSESS: CPT | Performed by: OBSTETRICS & GYNECOLOGY

## 2022-04-12 PROCEDURE — G9899 SCRN MAM PERF RSLTS DOC: HCPCS | Performed by: OBSTETRICS & GYNECOLOGY

## 2022-04-12 PROCEDURE — G8399 PT W/DXA RESULTS DOCUMENT: HCPCS | Performed by: OBSTETRICS & GYNECOLOGY

## 2022-04-12 PROCEDURE — G8432 DEP SCR NOT DOC, RNG: HCPCS | Performed by: OBSTETRICS & GYNECOLOGY

## 2022-04-12 PROCEDURE — 99214 OFFICE O/P EST MOD 30 MIN: CPT | Performed by: OBSTETRICS & GYNECOLOGY

## 2022-04-12 PROCEDURE — 3017F COLORECTAL CA SCREEN DOC REV: CPT | Performed by: OBSTETRICS & GYNECOLOGY

## 2022-04-12 PROCEDURE — G8427 DOCREV CUR MEDS BY ELIG CLIN: HCPCS | Performed by: OBSTETRICS & GYNECOLOGY

## 2022-04-12 PROCEDURE — 1101F PT FALLS ASSESS-DOCD LE1/YR: CPT | Performed by: OBSTETRICS & GYNECOLOGY

## 2022-04-12 PROCEDURE — G8536 NO DOC ELDER MAL SCRN: HCPCS | Performed by: OBSTETRICS & GYNECOLOGY

## 2022-04-12 NOTE — PROGRESS NOTES
[address] [place] [address] [address], [address] [facility]  P [phone]  F [phone]    Office Note  Patient ID:  Name:  Annita Crews  MRN:  [id number]  :  1955/66 y.o. Date:  2022      HISTORY OF PRESENT ILLNESS:  Ms. Annita Crews is a 77 y.o. female with a working diagnosis of complex endometrial hyperplasia with atypia. On 2021 underwent Total laparoscopic hysterectomy with bilateral salpingo-oophorectomy, Bilateral sentinel lymph node mapping and biopsy, lysis of adhesions. Final pathology consistent with Stage Ia, FIGO Grade 1 endometrial cancer. 1mm myometrial invasion (<50%). Negative LVSI. Negative SLN. Presents today for continued surveillance. Doing well without complaints. Denies vaginal bleeding/discharge, abdominal/pelvic pain, nausea, vomiting, constipation, diarrhea, CP, SOB, hematuria, hematochezia, change in appetite or bowel movements, bloating, fevers, chills, or urinary symptoms. Initial History:  Ms. Annita Crews is a 77 y.o. postmenopausal female who presents as a new patient from Dr. Nelsy Navarro for complex endometrial hyperplasia with atypia. The patient reports life stressors last summer, and during all of that she reports having a light period in 2020. She presented to her PCPs office in 2020 per patient and reported that she had a normal pap smear and no further workup was done. She later presented in 2021 to her PCP and saw her doctor rather than the NP who recommended a referral to an [place]. She ultimately saw Dr. Nelsy Navarro and underwent an endometrial biopsy on 2021 consistent with complex endometrial hyperplasia with atypia. Reports very minimal spotting now. Denies pelvic or abdominal pain/bloating. Denies CP or SOB. Denies nausea or vomiting. Denies change in appetite or bowel habits. Pertinent PMH/PSH: Smoker, HTN, HPL      Active, no restrictions.      Pathology Review:  2021:  * * *FINAL PATHOLOGIC DIAGNOSIS* * *  1. Uterus (fragmented), cervix, bilateral fallopian tubes and ovaries;   simple hysterectomy, BSO:        Endometrium: Endometrioid adenocarcinoma, FIGO grade 1, see synoptic   report        Myometrium: Leiomyomas (228 g specimen)        Serosa: Fibrous serosal adhesions involving uterus and adnexa        2.  Right pelvic sentinel lymph node, biopsy:        One lymph node, negative for metastatic carcinoma, levels and   cytokeratin stain examined (0/1)  3.  Left pelvic sentinel lymph node, biopsy:        One lymph node, negative for metastatic carcinoma, levels and   cytokeratin stain examined (0/1)  ENDOMETRIUM    SPECIMEN       Procedure: Total hysterectomy and bilateral salpingo-oophorectomy       Specimen Integrity: Fragmented    TUMOR       Histologic Type: Endometrioid carcinoma, NOS       Histologic Grade: FIGO grade 1       Myometrial Invasion: Present; exact percentage invasion cannot be   determined           Reason Percentage of Invasion Cannot be Determined: Myometrial   thickness cannot             be determined: Specimen fragmented           Depth of invasion: 1 mm           Estimated Percentage of Myometrial Invasion: Less than 50%       Uterine Serosa Involvement: Not identified       Lower Uterine Segment Involvement: Not identified       Cervical Stromal Involvement: Not identified       Other Tissue / Organ Involvement: Not identified       Peritoneal Ascitic Fluid: Not submitted / unknown       Lymphovascular Invasion: Not identified    MARGINS       Margins: Not applicable    LYMPH NODES       Lymph Node Status:  All lymph nodes negative for tumor cells           Total Number of Pelvic Nodes Examined: 2           Number of Pelvic [place] Nodes Examined: 2           Total Number of Para-aortic Nodes Examined: 0    PATHOLOGIC STAGE CLASSIFICATION (pTNM, AJCC 8th Edition)       Primary Tumor (pT): pT1a       Regional Lymph Nodes Modifier: (sn)       Regional Lymph Nodes (pN): pN0    FIGO STAGE       FIGO Stage: IA    ADDITIONAL FINDINGS       Additional Findings: Benign endometrial polyp  Assessment of mismatch repair and estrogen receptor pending, please see   addendum     5/11/2021:  FINAL PATHOLOGIC DIAGNOSIS   Endometrium, biopsy:   Complex endometrial hyperplasia with architectural atypia, 2 mm fragment (limited tissue)       ROS:  A comprehensive review of systems was negative except for that written in the History of Present Illness. , 10 point ROS    OB/GYN ROS:  Per HPI    ECOG ndGndrndanddndend:nd nd2nd Problem List:  Patient Active Problem List    Diagnosis Date Noted    Endometrial cancer ([address] 07/12/2021    EIN (endometrial intraepithelial neoplasia) 05/31/2021    Vitamin D deficiency 04/17/2018    Smoking 04/17/2018    Obesity (BMI 30.0-34.9) 08/10/2016    HTN (hypertension) 12/17/2012    Mixed hyperlipidemia 12/17/2012     PMH:  Past Medical History:   Diagnosis Date    Complex endometrial hyperplasia     COVID-19 vaccine series completed 3/22/21,  4/23/21    MODERNA    Herpes     High cholesterol     Hypercholesterolemia     Hypertension     Post-menopausal bleeding       PSH:  Past Surgical History:   Procedure Laterality Date    HX COLONOSCOPY      HX GYN      HX HYSTERECTOMY  07/2021      Social History:  Social History     Tobacco Use    Smoking status: Current Every Day Smoker     Packs/day: 0.30     Years: 40.00     Pack years: 12.00     Types: Cigarettes    Smokeless tobacco: Never Used   Substance Use Topics    Alcohol use:  Yes     Alcohol/week: 0.0 standard drinks     Comment: Socially      Family History:  Family History   Problem Relation Age of Onset   [de-identified] Alzheimer's Disease Mother     Hypertension Sister     Asthma Brother     Cancer Maternal Aunt         bone ca    Hypertension Brother     Anesth Problems Neg Hx       Medications: (reviewed)  Current Outpatient Medications   Medication Sig    hydroCHLOROthiazide (HYDRODIURIL) 25 mg tablet Take 1 Tablet by mouth daily. DC HCTZ 12.5 mg    simvastatin (ZOCOR) 20 mg tablet TAKE 1 TABLET BY MOUTH AT  NIGHT    diclofenac (VOLTAREN) 1 % gel Apply  to affected area two (2) times daily as needed for Pain.  bisoprolol-hydroCHLOROthiazide (ZIAC) 5-6.25 mg per tablet TAKE 1 TABLET BY MOUTH ONCE DAILY IN THE MORNING    aspirin delayed-release 81 mg tablet Take 81 mg by mouth daily. Pt stated she takes couple times a week    cyanocobalamin (Vitamin B-12) 1,000 mcg tablet Take 1,000 mcg by mouth daily.  cholecalciferol (Vitamin D3) 25 mcg (1,000 unit) cap Take 2,000 Units by mouth every other day.  Cetirizine 10 mg cap Take 10 mg by mouth Every morning.  fluticasone propionate (Flonase Allergy Relief) 50 mcg/actuation nasal spray 1 [place] by Both Nostrils route daily.  calcium carbonate (TUMS) 200 mg calcium (500 mg) chew Take 1 Tab by mouth daily as needed. No current facility-administered medications for this visit. Allergies: (reviewed)  No Known Allergies       OBJECTIVE:  Physical Exam:  Visit Vitals  Ht 5' 4\" (1.626 m)   Wt 200 lb (90.7 kg)   LMP 10/26/2020   BMI 34.33 kg/m²      General: Alert and oriented. No acute distress. Well-nourished  HEENT: No thyroid enlargment. Neck supple without restrictions. Sclera normal. Normal occular motion. Moist mucous membranes. Lymphatics: No evidence of axillary, cervical, or subclavicular adenopathy. Respiratory: clear to auscultation and percussion to the bases. No CVAT. Cardiovascular: regular rate and rhythm. No murmurs, rubs, or gallops. Gastrointestinal: soft, non-tender, non-distended, no masses or organomegaly. Well-healed incision. Musculoskeletal: normal gait. No joint tenderness, deformity or swelling. No muscular tenderness. Extremities: extremities normal, atraumatic, no cyanosis or edema. Pelvic: exam chaperoned by nurse. Normal appearing external genitalia. On speculum exam, the vagina is atrophic.  The uterus and cervix are surgically absent. No evidence of masses or nodularity on bimanual exam. Deferred rectovaginal exam.   Neuro: Grossly intact. Normal gait and movement. No acute deficit  Skin: No evidence of rashes or skin changes. IMPRESSION/PLAN:    Ms. Ammon Medina is a 77 y.o. female with a working diagnosis of complex endometrial hyperplasia with atypia. On 7/12/2021 underwent Total laparoscopic hysterectomy with bilateral salpingo-oophorectomy, Bilateral sentinel lymph node mapping and biopsy, lysis of adhesions. Final pathology consistent with Stage Ia, FIGO Grade 1 endometrial cancer. 1mm myometrial invasion (<50%). Negative LVSI. Negative SLN. 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 MARILEE on exam today. Reassured patient. RTC in 3 months for continued surveillance. Reviewed precautionary symptoms to return sooner. All questions and concerns were addressed with the patient and she is comfortable with the plan. An electronic signature was used to authenticate this note.      Boom Rosario MD

## 2022-06-27 DIAGNOSIS — E78.2 MIXED HYPERLIPIDEMIA: ICD-10-CM

## 2022-06-27 DIAGNOSIS — I10 ESSENTIAL HYPERTENSION: ICD-10-CM

## 2022-06-27 RX ORDER — BISOPROLOL FUMARATE AND HYDROCHLOROTHIAZIDE 5; 6.25 MG/1; MG/1
TABLET ORAL
Qty: 90 TABLET | Refills: 1 | Status: SHIPPED | OUTPATIENT
Start: 2022-06-27 | End: 2022-07-07 | Stop reason: SDUPTHER

## 2022-06-27 NOTE — TELEPHONE ENCOUNTER
[address], [address] [address]  Phone: [phone] Fax: [phone]     Requested Prescriptions     Pending Prescriptions Disp Refills    bisoprolol-hydroCHLOROthiazide [facility]) 5-6.25 mg per tablet 90 Tablet 1     Sig: TAKE 1 TABLET BY MOUTH ONCE DAILY IN THE MORNING        3/30/2022  Last OV    Future Appointments   Date Time Provider Donavan Bhandari   7/12/2022 11:45 AM Flores Alvarez MD [facility]   7/27/2022 10:45 AM Favian Ojeda MD [facility]        [phone] TO EXPEDITE

## 2022-06-27 NOTE — TELEPHONE ENCOUNTER
Please call patient to figure out how she is to get her rx's she states ra has requested and we refused

## 2022-07-07 DIAGNOSIS — I10 ESSENTIAL HYPERTENSION: ICD-10-CM

## 2022-07-07 RX ORDER — BISOPROLOL FUMARATE AND HYDROCHLOROTHIAZIDE 5; 6.25 MG/1; MG/1
TABLET ORAL
Qty: 90 TABLET | Refills: 1 | Status: SHIPPED | OUTPATIENT
Start: 2022-07-07 | End: 2022-10-31 | Stop reason: ALTCHOICE

## 2022-07-07 NOTE — TELEPHONE ENCOUNTER
Requested Prescriptions     Pending Prescriptions Disp Refills    bisoprolol-hydroCHLOROthiazide (ZIAC) 5-6.25 mg per tablet 90 Tablet 1     Sig: TAKE 1 TABLET BY MOUTH ONCE DAILY IN THE [facility]         [facility] [address], [address] [address]  Phone: [phone] Fax: [phone]       3/30/2022 is LAST OFFICE VISIT     Future Appointments   Date Time Provider Donavan Bhandari   7/12/2022 11:45 AM Britney Macias MD [facility] AMB   7/27/2022 10:45 AM Mary Hernandez MD [facility] BS AMB

## 2022-07-07 NOTE — TELEPHONE ENCOUNTER
Pt is only using [facility]. Patient had script filled through [facility] because she ran out. Pt wants new script for the refill sent to [facility] mail order.  She does not want this medication refilled through [facility]

## 2022-07-18 DIAGNOSIS — B37.2 CANDIDAL INTERTRIGO: ICD-10-CM

## 2022-07-18 RX ORDER — CLOTRIMAZOLE AND BETAMETHASONE DIPROPIONATE 10; .64 MG/G; MG/G
CREAM TOPICAL
Qty: 60 G | Refills: 0 | Status: SHIPPED | OUTPATIENT
Start: 2022-07-18

## 2022-07-18 NOTE — PROGRESS NOTES
[address] [facility] [address], [address]  P [phone]  F [phone]    Office Note  Patient ID:  Name:  Tadeo Bridges  MRN:  [id number]  :  1955/66 y.o. Date:  2022      HISTORY OF PRESENT ILLNESS:  Ms. Tadeo Bridges is a 77 y.o. female with a working diagnosis of complex endometrial hyperplasia with atypia. On 2021 underwent Total laparoscopic hysterectomy with bilateral salpingo-oophorectomy, Bilateral sentinel lymph node mapping and biopsy, lysis of adhesions. Final pathology consistent with Stage Ia, FIGO Grade 1 endometrial cancer. 1mm myometrial invasion (<50%). Negative LVSI. Negative SLN. Presents today for continued surveillance. Doing well without complaints. Denies vaginal bleeding/discharge, abdominal/pelvic pain, nausea, vomiting, constipation, diarrhea, CP, SOB, hematuria, hematochezia, change in appetite or bowel movements, bloating, fevers, chills, or urinary symptoms. Initial History:  Ms. Tadeo Bridges is a 77 y.o. postmenopausal female who presents as a new patient from Dr. Johnnie Alvarado for complex endometrial hyperplasia with atypia. The patient reports life stressors last summer, and during all of that she reports having a light period in 2020. She presented to her PCPs office in 2020 per patient and reported that she had a normal pap smear and no further workup was done. She later presented in 2021 to her PCP and saw her doctor rather than the NP who recommended a referral to an [place]. She ultimately saw Dr. Johnnie Alvarado and underwent an endometrial biopsy on 2021 consistent with complex endometrial hyperplasia with atypia. Reports very minimal spotting now. Denies pelvic or abdominal pain/bloating. Denies CP or SOB. Denies nausea or vomiting. Denies change in appetite or bowel habits. Pertinent PMH/PSH: Smoker, HTN, HPL      Active, no restrictions.      Pathology Review:  2021:  * * *FINAL PATHOLOGIC DIAGNOSIS* * *  1. Uterus (fragmented), cervix, bilateral fallopian tubes and ovaries;   simple hysterectomy, BSO:        Endometrium: Endometrioid adenocarcinoma, FIGO grade 1, see synoptic   report        Myometrium: Leiomyomas (228 g specimen)        Serosa: Fibrous serosal adhesions involving uterus and adnexa        2. Right pelvic sentinel lymph node, biopsy:        One lymph node, negative for metastatic carcinoma, levels and   cytokeratin stain examined (0/1)  3. Left pelvic sentinel lymph node, biopsy:        One lymph node, negative for metastatic carcinoma, levels and   cytokeratin stain examined (0/1)  ENDOMETRIUM    SPECIMEN       Procedure: Total hysterectomy and bilateral salpingo-oophorectomy       Specimen Integrity: Fragmented    TUMOR       Histologic Type: Endometrioid carcinoma, NOS       Histologic Grade: FIGO grade 1       Myometrial Invasion: Present; exact percentage invasion cannot be   determined           Reason Percentage of Invasion Cannot be Determined: Myometrial   thickness cannot             be determined: Specimen fragmented           Depth of invasion: 1 mm           Estimated Percentage of Myometrial Invasion: Less than 50%       Uterine Serosa Involvement: Not identified       Lower Uterine Segment Involvement: Not identified       Cervical Stromal Involvement: Not identified       Other Tissue / Organ Involvement: Not identified       Peritoneal Ascitic Fluid: Not submitted / unknown       Lymphovascular Invasion: Not identified    MARGINS       Margins: Not applicable    LYMPH NODES       Lymph Node Status:  All lymph nodes negative for tumor cells           Total Number of Pelvic Nodes Examined: 2           Number of Pelvic [place] Nodes Examined: 2           Total Number of Para-aortic Nodes Examined: 0    PATHOLOGIC STAGE CLASSIFICATION (pTNM, AJCC 8th Edition)       Primary Tumor (pT): pT1a       Regional Lymph Nodes Modifier: (sn)       Regional Lymph Nodes (pN): pN0    FIGO STAGE       FIGO Stage: IA    ADDITIONAL FINDINGS       Additional Findings: Benign endometrial polyp  Assessment of mismatch repair and estrogen receptor pending, please see   addendum     5/11/2021:  FINAL PATHOLOGIC DIAGNOSIS   Endometrium, biopsy:   Complex endometrial hyperplasia with architectural atypia, 2 mm fragment (limited tissue)       ROS:  A comprehensive review of systems was negative except for that written in the History of Present Illness. 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    Vitamin D deficiency 04/17/2018    Smoking 04/17/2018    Obesity (BMI 30.0-34.9) 08/10/2016    HTN (hypertension) 12/17/2012    Mixed hyperlipidemia 12/17/2012     PMH:  Past Medical History:   Diagnosis Date    Complex endometrial hyperplasia     COVID-19 vaccine series completed 3/22/21,  4/23/21    MODERNA    Herpes     High cholesterol     Hypercholesterolemia     Hypertension     Post-menopausal bleeding       PSH:  Past Surgical History:   Procedure Laterality Date    HX COLONOSCOPY      HX GYN      HX HYSTERECTOMY  07/2021      Social History:  Social History     Tobacco Use    Smoking status: Every Day     Packs/day: 0.30     Years: 40.00     Pack years: 12.00     Types: Cigarettes    Smokeless tobacco: Never   Substance Use Topics    Alcohol use:  Yes     Alcohol/week: 0.0 standard drinks     Comment: Socially      Family History:  Family History   Problem Relation Age of Onset    Alzheimer's Disease Mother     Hypertension Sister     Asthma Brother     Cancer Maternal Aunt         bone ca    Hypertension Brother     Anesth Problems Neg Hx       Medications: (reviewed)  Current Outpatient Medications   Medication Sig    clotrimazole-betamethasone (LOTRISONE) topical cream APPLY  CREAM TOPICALLY TO AFFECTED AREA TWICE DAILY    bisoprolol-hydroCHLOROthiazide (ZIAC) 5-6.25 mg per tablet TAKE 1 TABLET BY MOUTH ONCE DAILY IN THE MORNING    hydroCHLOROthiazide (HYDRODIURIL) 25 mg tablet Take 1 Tablet by mouth daily. DC HCTZ 12.5 mg    simvastatin (ZOCOR) 20 mg tablet TAKE 1 TABLET BY MOUTH AT  NIGHT    diclofenac (VOLTAREN) 1 % gel Apply  to affected area two (2) times daily as needed for Pain. aspirin delayed-release 81 mg tablet Take 81 mg by mouth daily. Pt stated she takes couple times a week    cholecalciferol (VITAMIN D3) 25 mcg (1,000 unit) cap Take 2,000 Units by mouth every other day. Cetirizine 10 mg cap Take 10 mg by mouth Every morning. fluticasone propionate (FLONASE) 50 mcg/actuation nasal spray 1 [place] by Both Nostrils route daily. calcium carbonate (TUMS) 200 mg calcium (500 mg) chew Take 1 Tab by mouth daily as needed. cyanocobalamin (Vitamin B-12) 1,000 mcg tablet Take 1,000 mcg by mouth daily. No current facility-administered medications for this visit. Allergies: (reviewed)  No Known Allergies       OBJECTIVE:  Physical Exam:  Visit Vitals  BP (!) 157/70 (BP 1 Location: Left upper arm, BP Patient Position: Sitting)   Pulse (!) 54   Ht 5' 4\" (1.626 m)   Wt 204 lb (92.5 kg)   LMP 10/26/2020   BMI 35.02 kg/m²      General: Alert and oriented. No acute distress. Well-nourished  HEENT: No thyroid enlargment. Neck supple without restrictions. Sclera normal. Normal occular motion. Moist mucous membranes. Lymphatics: No evidence of axillary, cervical, or subclavicular adenopathy. Respiratory: clear to auscultation and percussion to the bases. No CVAT. Cardiovascular: regular rate and rhythm. No murmurs, rubs, or gallops. Gastrointestinal: soft, non-tender, non-distended, no masses or organomegaly. Well-healed incision. Musculoskeletal: normal gait. No joint tenderness, deformity or swelling. No muscular tenderness.    Extremities: extremities normal, atraumatic, no cyanosis or edema. Pelvic: exam chaperoned by nurse. Normal appearing external genitalia. On speculum exam, the vagina is atrophic. The uterus and cervix are surgically absent. No evidence of masses or nodularity on bimanual exam. Deferred rectovaginal exam.   Neuro: Grossly intact. Normal gait and movement. No acute deficit  Skin: No evidence of rashes or skin changes. IMPRESSION/PLAN:    Ms. Renu Mai is a 77 y.o. female with a working diagnosis of complex endometrial hyperplasia with atypia. On 7/12/2021 underwent Total laparoscopic hysterectomy with bilateral salpingo-oophorectomy, Bilateral sentinel lymph node mapping and biopsy, lysis of adhesions. Final pathology consistent with Stage Ia, FIGO Grade 1 endometrial cancer. 1mm myometrial invasion (<50%). Negative LVSI. Negative SLN. 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 MARILEE on exam today. Reassured patient. RTC in 3 months for continued surveillance. Reviewed precautionary symptoms to return sooner. All questions and concerns were addressed with the patient and she is comfortable with the plan. An electronic signature was used to authenticate this note.      Katie Romero MD

## 2022-07-20 ENCOUNTER — OFFICE VISIT (OUTPATIENT)
Dept: GYNECOLOGY | Age: 67
End: 2022-07-20
Payer: MEDICARE

## 2022-07-20 VITALS
DIASTOLIC BLOOD PRESSURE: 70 MMHG | WEIGHT: 204 LBS | BODY MASS INDEX: 34.83 KG/M2 | SYSTOLIC BLOOD PRESSURE: 157 MMHG | HEART RATE: 54 BPM | HEIGHT: 64 IN

## 2022-07-20 DIAGNOSIS — C54.1 ENDOMETRIAL CANCER (HCC): Primary | ICD-10-CM

## 2022-07-20 PROCEDURE — 1123F ACP DISCUSS/DSCN MKR DOCD: CPT | Performed by: OBSTETRICS & GYNECOLOGY

## 2022-07-20 PROCEDURE — G8753 SYS BP > OR = 140: HCPCS | Performed by: OBSTETRICS & GYNECOLOGY

## 2022-07-20 PROCEDURE — G8754 DIAS BP LESS 90: HCPCS | Performed by: OBSTETRICS & GYNECOLOGY

## 2022-07-20 PROCEDURE — 1101F PT FALLS ASSESS-DOCD LE1/YR: CPT | Performed by: OBSTETRICS & GYNECOLOGY

## 2022-07-20 PROCEDURE — G8536 NO DOC ELDER MAL SCRN: HCPCS | Performed by: OBSTETRICS & GYNECOLOGY

## 2022-07-20 PROCEDURE — 3017F COLORECTAL CA SCREEN DOC REV: CPT | Performed by: OBSTETRICS & GYNECOLOGY

## 2022-07-20 PROCEDURE — 1090F PRES/ABSN URINE INCON ASSESS: CPT | Performed by: OBSTETRICS & GYNECOLOGY

## 2022-07-20 PROCEDURE — G8427 DOCREV CUR MEDS BY ELIG CLIN: HCPCS | Performed by: OBSTETRICS & GYNECOLOGY

## 2022-07-20 PROCEDURE — 99214 OFFICE O/P EST MOD 30 MIN: CPT | Performed by: OBSTETRICS & GYNECOLOGY

## 2022-07-20 PROCEDURE — G8399 PT W/DXA RESULTS DOCUMENT: HCPCS | Performed by: OBSTETRICS & GYNECOLOGY

## 2022-07-20 PROCEDURE — G9899 SCRN MAM PERF RSLTS DOC: HCPCS | Performed by: OBSTETRICS & GYNECOLOGY

## 2022-07-20 PROCEDURE — G8417 CALC BMI ABV UP PARAM F/U: HCPCS | Performed by: OBSTETRICS & GYNECOLOGY

## 2022-07-20 PROCEDURE — G8432 DEP SCR NOT DOC, RNG: HCPCS | Performed by: OBSTETRICS & GYNECOLOGY

## 2022-08-17 DIAGNOSIS — I10 ESSENTIAL HYPERTENSION: ICD-10-CM

## 2022-08-17 NOTE — TELEPHONE ENCOUNTER
Requested Prescriptions     Pending Prescriptions Disp Refills    hydroCHLOROthiazide (HYDRODIURIL) 25 mg tablet 30 Tablet 0     Sig: Take 1 Tablet by mouth daily.  DC HCTZ 12.5 mg   03/30/2022 08/31/2022  [facility]

## 2022-08-18 RX ORDER — HYDROCHLOROTHIAZIDE 25 MG/1
25 TABLET ORAL DAILY
Qty: 30 TABLET | Refills: 0 | Status: SHIPPED | OUTPATIENT
Start: 2022-08-18 | End: 2022-10-31 | Stop reason: ALTCHOICE

## 2022-10-25 ENCOUNTER — OFFICE VISIT (OUTPATIENT)
Dept: GYNECOLOGY | Age: 67
End: 2022-10-25
Payer: MEDICARE

## 2022-10-25 VITALS
SYSTOLIC BLOOD PRESSURE: 156 MMHG | DIASTOLIC BLOOD PRESSURE: 81 MMHG | WEIGHT: 209.2 LBS | HEIGHT: 64 IN | HEART RATE: 62 BPM | BODY MASS INDEX: 35.71 KG/M2

## 2022-10-25 DIAGNOSIS — C54.1 ENDOMETRIAL CANCER (HCC): Primary | ICD-10-CM

## 2022-10-25 DIAGNOSIS — E66.01 SEVERE OBESITY (BMI 35.0-39.9) WITH COMORBIDITY (HCC): ICD-10-CM

## 2022-10-25 PROCEDURE — 1123F ACP DISCUSS/DSCN MKR DOCD: CPT | Performed by: OBSTETRICS & GYNECOLOGY

## 2022-10-25 PROCEDURE — 3017F COLORECTAL CA SCREEN DOC REV: CPT | Performed by: OBSTETRICS & GYNECOLOGY

## 2022-10-25 PROCEDURE — G8427 DOCREV CUR MEDS BY ELIG CLIN: HCPCS | Performed by: OBSTETRICS & GYNECOLOGY

## 2022-10-25 PROCEDURE — G8753 SYS BP > OR = 140: HCPCS | Performed by: OBSTETRICS & GYNECOLOGY

## 2022-10-25 PROCEDURE — G8399 PT W/DXA RESULTS DOCUMENT: HCPCS | Performed by: OBSTETRICS & GYNECOLOGY

## 2022-10-25 PROCEDURE — G8432 DEP SCR NOT DOC, RNG: HCPCS | Performed by: OBSTETRICS & GYNECOLOGY

## 2022-10-25 PROCEDURE — G8417 CALC BMI ABV UP PARAM F/U: HCPCS | Performed by: OBSTETRICS & GYNECOLOGY

## 2022-10-25 PROCEDURE — 1101F PT FALLS ASSESS-DOCD LE1/YR: CPT | Performed by: OBSTETRICS & GYNECOLOGY

## 2022-10-25 PROCEDURE — G8536 NO DOC ELDER MAL SCRN: HCPCS | Performed by: OBSTETRICS & GYNECOLOGY

## 2022-10-25 PROCEDURE — G9899 SCRN MAM PERF RSLTS DOC: HCPCS | Performed by: OBSTETRICS & GYNECOLOGY

## 2022-10-25 PROCEDURE — G8754 DIAS BP LESS 90: HCPCS | Performed by: OBSTETRICS & GYNECOLOGY

## 2022-10-25 PROCEDURE — 1090F PRES/ABSN URINE INCON ASSESS: CPT | Performed by: OBSTETRICS & GYNECOLOGY

## 2022-10-25 PROCEDURE — 99214 OFFICE O/P EST MOD 30 MIN: CPT | Performed by: OBSTETRICS & GYNECOLOGY

## 2022-10-25 NOTE — PROGRESS NOTES
3 month follow up for endometrial cancer ,  pt reports no abnormal spotting or bleeding, pt states no questions or concerns for today's visit    1. Have you been to the ER, urgent care clinic since your last visit? Hospitalized since your last visit?  no    2. Have you seen or consulted any other health care providers outside of the [address] [place] [address] since your last visit? Include any pap smears or colon screening.    no

## 2022-10-25 NOTE — PROGRESS NOTES
[address] [place] [address] Mathias Moritz [address], [address]26 [facility] [phone]  F [phone]    Office Note  Patient ID:  Name:  Camilla Roberto  MRN:  [id number]  :  1955/66 y.o. Date:  10/25/2022      HISTORY OF PRESENT ILLNESS:  Ms. Camilla Roberto is a 77 y.o. female with a working diagnosis of complex endometrial hyperplasia with atypia. On 2021 underwent Total laparoscopic hysterectomy with bilateral salpingo-oophorectomy, Bilateral sentinel lymph node mapping and biopsy, lysis of adhesions. Final pathology consistent with Stage Ia, FIGO Grade 1 endometrial cancer. 1mm myometrial invasion (<50%). Negative LVSI. Negative SLN. Presents today for continued surveillance. Doing well without complaints. Denies vaginal bleeding/discharge, abdominal/pelvic pain, nausea, vomiting, constipation, diarrhea, CP, SOB, hematuria, hematochezia, change in appetite or bowel movements, bloating, fevers, chills, or urinary symptoms. Initial History:  Ms. Camilla Roberto is a 77 y.o. postmenopausal female who presents as a new patient from Dr. Sylvia Lundberg for complex endometrial hyperplasia with atypia. The patient reports life stressors last summer, and during all of that she reports having a light period in 2020. She presented to her PCPs office in 2020 per patient and reported that she had a normal pap smear and no further workup was done. She later presented in 2021 to her PCP and saw her doctor rather than the NP who recommended a referral to an Annabelle Spencer. She ultimately saw Dr. Sylvia Lundberg and underwent an endometrial biopsy on 2021 consistent with complex endometrial hyperplasia with atypia. Reports very minimal spotting now. Denies pelvic or abdominal pain/bloating. Denies CP or SOB. Denies nausea or vomiting. Denies change in appetite or bowel habits. Pertinent PMH/PSH: Smoker, HTN, HPL      Active, no restrictions.      Pathology Review:  2021:  * * *FINAL PATHOLOGIC DIAGNOSIS* * *  1. Uterus (fragmented), cervix, bilateral fallopian tubes and ovaries;   simple hysterectomy, BSO:        Endometrium: Endometrioid adenocarcinoma, FIGO grade 1, see synoptic   report        Myometrium: Leiomyomas (228 g specimen)        Serosa: Fibrous serosal adhesions involving uterus and adnexa        2. Right pelvic sentinel lymph node, biopsy:        One lymph node, negative for metastatic carcinoma, levels and   cytokeratin stain examined (0/1)  3. Left pelvic sentinel lymph node, biopsy:        One lymph node, negative for metastatic carcinoma, levels and   cytokeratin stain examined (0/1)  ENDOMETRIUM    SPECIMEN       Procedure: Total hysterectomy and bilateral salpingo-oophorectomy       Specimen Integrity: Fragmented    TUMOR       Histologic Type: Endometrioid carcinoma, NOS       Histologic Grade: FIGO grade 1       Myometrial Invasion: Present; exact percentage invasion cannot be   determined           Reason Percentage of Invasion Cannot be Determined: Myometrial   thickness cannot             be determined: Specimen fragmented           Depth of invasion: 1 mm           Estimated Percentage of Myometrial Invasion: Less than 50%       Uterine Serosa Involvement: Not identified       Lower Uterine Segment Involvement: Not identified       Cervical Stromal Involvement: Not identified       Other Tissue / Organ Involvement: Not identified       Peritoneal Ascitic Fluid: Not submitted / unknown       Lymphovascular Invasion: Not identified    MARGINS       Margins: Not applicable    LYMPH NODES       Lymph Node Status:  All lymph nodes negative for tumor cells           Total Number of Pelvic Nodes Examined: 2           Number of Pelvic Saucier Nodes Examined: 2           Total Number of Para-aortic Nodes Examined: 0    PATHOLOGIC STAGE CLASSIFICATION (pTNM, AJCC 8th Edition)       Primary Tumor (pT): pT1a       Regional Lymph Nodes Modifier: (sn)       Regional Lymph Nodes (pN): pN0    FIGO STAGE       FIGO Stage: IA    ADDITIONAL FINDINGS       Additional Findings: Benign endometrial polyp  Assessment of mismatch repair and estrogen receptor pending, please see   addendum     5/11/2021:  FINAL PATHOLOGIC DIAGNOSIS   Endometrium, biopsy:   Complex endometrial hyperplasia with architectural atypia, 2 mm fragment (limited tissue)       ROS:  A comprehensive review of systems was negative except for that written in the History of Present Illness. 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    Vitamin D deficiency 04/17/2018    Smoking 04/17/2018    Obesity (BMI 30.0-34.9) 08/10/2016    HTN (hypertension) 12/17/2012    Mixed hyperlipidemia 12/17/2012     PMH:  Past Medical History:   Diagnosis Date    Complex endometrial hyperplasia     COVID-19 vaccine series completed 3/22/21,  4/23/21    MODERNA    Herpes     High cholesterol     Hypercholesterolemia     Hypertension     Post-menopausal bleeding       PSH:  Past Surgical History:   Procedure Laterality Date    HX COLONOSCOPY      HX GYN      HX HYSTERECTOMY  07/2021      Social History:  Social History     Tobacco Use    Smoking status: Every Day     Packs/day: 0.30     Years: 40.00     Pack years: 12.00     Types: Cigarettes    Smokeless tobacco: Never   Substance Use Topics    Alcohol use: Yes     Alcohol/week: 0.0 standard drinks     Comment: Socially      Family History:  Family History   Problem Relation Age of Onset    Alzheimer's Disease Mother     Hypertension Sister     Asthma Brother     Cancer Maternal Aunt         bone ca    Hypertension Brother     Anesth Problems Neg Hx       Medications: (reviewed)  Current Outpatient Medications   Medication Sig    hydroCHLOROthiazide (HYDRODIURIL) 25 mg tablet Take 1 Tablet by mouth daily.  DC HCTZ 12.5 mg    clotrimazole-betamethasone (LOTRISONE) topical cream APPLY  CREAM TOPICALLY TO AFFECTED AREA TWICE DAILY    bisoprolol-hydroCHLOROthiazide (ZIAC) 5-6.25 mg per tablet TAKE 1 TABLET BY MOUTH ONCE DAILY IN THE MORNING    simvastatin (ZOCOR) 20 mg tablet TAKE 1 TABLET BY MOUTH AT  NIGHT    diclofenac (VOLTAREN) 1 % gel Apply  to affected area two (2) times daily as needed for Pain. aspirin delayed-release 81 mg tablet Take 81 mg by mouth daily. Pt stated she takes couple times a week    cyanocobalamin 1,000 mcg tablet Take 1,000 mcg by mouth daily. cholecalciferol (VITAMIN D3) 25 mcg (1,000 unit) cap Take 2,000 Units by mouth every other day. Cetirizine 10 mg cap Take 10 mg by mouth Every morning. fluticasone propionate (FLONASE) 50 mcg/actuation nasal spray 1 [place] by Both Nostrils route daily. calcium carbonate (TUMS) 200 mg calcium (500 mg) chew Take 1 Tab by mouth daily as needed. No current facility-administered medications for this visit. Allergies: (reviewed)  No Known Allergies       OBJECTIVE:  Physical Exam:  Visit Vitals  BP (!) 156/81 (BP 1 Location: Left upper arm, BP Patient Position: Sitting)   Pulse 62   Ht 5' 4\" (1.626 m)   Wt 209 lb 3.2 oz (94.9 kg)   LMP 10/26/2020   BMI 35.91 kg/m²      General: Alert and oriented. No acute distress. Well-nourished  HEENT: No thyroid enlargment. Neck supple without restrictions. Sclera normal. Normal occular motion. Moist mucous membranes. Lymphatics: No evidence of axillary, cervical, or subclavicular adenopathy. Respiratory: clear to auscultation and percussion to the bases. No CVAT. Cardiovascular: regular rate and rhythm. No murmurs, rubs, or gallops. Gastrointestinal: soft, non-tender, non-distended, no masses or organomegaly. Well-healed incision. Musculoskeletal: normal gait. No joint tenderness, deformity or swelling. No muscular tenderness. Extremities: extremities normal, atraumatic, no cyanosis or edema.   Pelvic: exam chaperoned by nurse. Normal appearing external genitalia. On speculum exam, the vagina is atrophic. The uterus and cervix are surgically absent. No evidence of masses or nodularity on bimanual exam. Deferred rectovaginal exam.   Neuro: Grossly intact. Normal gait and movement. No acute deficit  Skin: No evidence of rashes or skin changes. IMPRESSION/PLAN:    Ms. Homero Stallworth is a 77 y.o. female with a working diagnosis of complex endometrial hyperplasia with atypia. On 7/12/2021 underwent Total laparoscopic hysterectomy with bilateral salpingo-oophorectomy, Bilateral sentinel lymph node mapping and biopsy, lysis of adhesions. Final pathology consistent with Stage Ia, FIGO Grade 1 endometrial cancer. 1mm myometrial invasion (<50%). Negative LVSI. Negative SLN. 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 MARILEE on exam today. Reassured patient. RTC in 3 months for continued surveillance. Reviewed precautionary symptoms to return sooner. All questions and concerns were addressed with the patient and she is comfortable with the plan. An electronic signature was used to authenticate this note.      Starr Bass MD

## 2022-10-31 ENCOUNTER — OFFICE VISIT (OUTPATIENT)
Dept: INTERNAL MEDICINE CLINIC | Age: 67
End: 2022-10-31
Payer: MEDICARE

## 2022-10-31 VITALS
WEIGHT: 207.8 LBS | SYSTOLIC BLOOD PRESSURE: 158 MMHG | HEIGHT: 64 IN | HEART RATE: 73 BPM | RESPIRATION RATE: 16 BRPM | TEMPERATURE: 98.3 F | OXYGEN SATURATION: 99 % | DIASTOLIC BLOOD PRESSURE: 78 MMHG | BODY MASS INDEX: 35.48 KG/M2

## 2022-10-31 DIAGNOSIS — F32.A DEPRESSION, UNSPECIFIED DEPRESSION TYPE: ICD-10-CM

## 2022-10-31 DIAGNOSIS — I10 ESSENTIAL HYPERTENSION: Primary | ICD-10-CM

## 2022-10-31 DIAGNOSIS — F17.200 SMOKING: ICD-10-CM

## 2022-10-31 DIAGNOSIS — R73.03 PREDIABETES: ICD-10-CM

## 2022-10-31 DIAGNOSIS — E78.2 MIXED HYPERLIPIDEMIA: ICD-10-CM

## 2022-10-31 DIAGNOSIS — E55.9 VITAMIN D DEFICIENCY: ICD-10-CM

## 2022-10-31 DIAGNOSIS — E66.9 OBESITY (BMI 30.0-34.9): ICD-10-CM

## 2022-10-31 PROCEDURE — G8399 PT W/DXA RESULTS DOCUMENT: HCPCS | Performed by: INTERNAL MEDICINE

## 2022-10-31 PROCEDURE — G8753 SYS BP > OR = 140: HCPCS | Performed by: INTERNAL MEDICINE

## 2022-10-31 PROCEDURE — G8754 DIAS BP LESS 90: HCPCS | Performed by: INTERNAL MEDICINE

## 2022-10-31 PROCEDURE — 3017F COLORECTAL CA SCREEN DOC REV: CPT | Performed by: INTERNAL MEDICINE

## 2022-10-31 PROCEDURE — 1090F PRES/ABSN URINE INCON ASSESS: CPT | Performed by: INTERNAL MEDICINE

## 2022-10-31 PROCEDURE — G8536 NO DOC ELDER MAL SCRN: HCPCS | Performed by: INTERNAL MEDICINE

## 2022-10-31 PROCEDURE — 99214 OFFICE O/P EST MOD 30 MIN: CPT | Performed by: INTERNAL MEDICINE

## 2022-10-31 PROCEDURE — 1101F PT FALLS ASSESS-DOCD LE1/YR: CPT | Performed by: INTERNAL MEDICINE

## 2022-10-31 PROCEDURE — G8417 CALC BMI ABV UP PARAM F/U: HCPCS | Performed by: INTERNAL MEDICINE

## 2022-10-31 PROCEDURE — G9899 SCRN MAM PERF RSLTS DOC: HCPCS | Performed by: INTERNAL MEDICINE

## 2022-10-31 PROCEDURE — G8427 DOCREV CUR MEDS BY ELIG CLIN: HCPCS | Performed by: INTERNAL MEDICINE

## 2022-10-31 PROCEDURE — G8511 SCR DEP POS, NO PLAN DOC RNG: HCPCS | Performed by: INTERNAL MEDICINE

## 2022-10-31 RX ORDER — TRIAMTERENE AND HYDROCHLOROTHIAZIDE 37.5; 25 MG/1; MG/1
1 CAPSULE ORAL DAILY
Qty: 30 CAPSULE | Refills: 3 | Status: SHIPPED | OUTPATIENT
Start: 2022-10-31

## 2022-10-31 RX ORDER — BISOPROLOL FUMARATE 5 MG/1
5 TABLET, FILM COATED ORAL DAILY
Qty: 30 TABLET | Refills: 3 | Status: SHIPPED | OUTPATIENT
Start: 2022-10-31 | End: 2022-10-31 | Stop reason: SDUPTHER

## 2022-10-31 RX ORDER — TRIAMTERENE AND HYDROCHLOROTHIAZIDE 37.5; 25 MG/1; MG/1
1 CAPSULE ORAL DAILY
Qty: 30 CAPSULE | Refills: 3 | Status: SHIPPED | OUTPATIENT
Start: 2022-10-31 | End: 2022-10-31 | Stop reason: SDUPTHER

## 2022-10-31 RX ORDER — BISOPROLOL FUMARATE 5 MG/1
5 TABLET, FILM COATED ORAL DAILY
Qty: 30 TABLET | Refills: 3 | Status: SHIPPED | OUTPATIENT
Start: 2022-10-31

## 2022-10-31 RX ORDER — SIMVASTATIN 20 MG/1
TABLET, FILM COATED ORAL
Qty: 30 TABLET | Refills: 0 | Status: SHIPPED | OUTPATIENT
Start: 2022-10-31

## 2022-10-31 NOTE — PROGRESS NOTES
Nursing staff confirmed patient with full name and . Prepared patient for visit today by obtaining vitals, verifying medication list and allergies, and briefly discussing reason for visit. Chief Complaint   Patient presents with    Follow Up Chronic Condition    Cholesterol Problem    Hypertension    Vitamin D Deficiency       Current Outpatient Medications   Medication Sig    hydroCHLOROthiazide (HYDRODIURIL) 25 mg tablet Take 1 Tablet by mouth daily. DC HCTZ 12.5 mg    clotrimazole-betamethasone (LOTRISONE) topical cream APPLY  CREAM TOPICALLY TO AFFECTED AREA TWICE DAILY    bisoprolol-hydroCHLOROthiazide (ZIAC) 5-6.25 mg per tablet TAKE 1 TABLET BY MOUTH ONCE DAILY IN THE MORNING    simvastatin (ZOCOR) 20 mg tablet TAKE 1 TABLET BY MOUTH AT  NIGHT    diclofenac (VOLTAREN) 1 % gel Apply  to affected area two (2) times daily as needed for Pain. aspirin delayed-release 81 mg tablet Take 81 mg by mouth daily. Pt stated she takes couple times a week    cyanocobalamin 1,000 mcg tablet Take 1,000 mcg by mouth daily. cholecalciferol (VITAMIN D3) 25 mcg (1,000 unit) cap Take 2,000 Units by mouth every other day. Cetirizine 10 mg cap Take 10 mg by mouth Every morning. fluticasone propionate (FLONASE) 50 mcg/actuation nasal spray 1 [place] by Both Nostrils route daily. calcium carbonate (TUMS) 200 mg calcium (500 mg) chew Take 1 Tab by mouth daily as needed. No current facility-administered medications for this visit. 1. \"Have you been to the ER, urgent care clinic since your last visit? Hospitalized since your last visit? \" No    2. \"Have you seen or consulted any other health care providers outside of the [address] [place] [address] since your last visit? \" No     3. For patients aged 39-70: Has the patient had a colonoscopy / FIT/ Cologuard? Yes - no Care Gap present      If the patient is female:    4. For patients aged 41-77: Has the patient had a mammogram within the past 2 years?  Yes - no Care Gap present      5. For patients aged 21-65: Has the patient had a pap smear?  NA - based on age or sex

## 2022-10-31 NOTE — PROGRESS NOTES
HISTORY OF PRESENT ILLNESS  Camilla Roberto is a 77 y.o. female here for follow up. Noticed to have have elevated blood pressure lately. She mention she is having a lot of popcorn with salt. Compliant with blood medication. Denies chest pain palpitation shortness of breath. Still smoking 5 cigarettes a day. She is sad and depressed, she has lot of friends  recently. Her depression score slightly high, no suicidal thought. Has elevated lipid. on statin. no myalgia. Has prediabetes, watching carbohydrate. Trying to lose weight. Had endometrial cancer, she is cancer free right now. Seeing oncologist.  Jameel Belle and bone density up-to-date. Hypertension     Obesity    Depression    Follow Up Chronic Condition      Review of Systems   Constitutional: Negative. HENT: Negative. Eyes: Negative. Respiratory: Negative. Cardiovascular: Negative. Gastrointestinal: Negative. Musculoskeletal: Negative. Negative for falls. Skin: Negative. Neurological: Negative. Endo/Heme/Allergies: Negative. Psychiatric/Behavioral:  Positive for depression. Negative for suicidal ideas. Physical Exam  Constitutional:       General: She is not in acute distress. Appearance: Normal appearance. She is well-developed. She is obese. Neck:      Thyroid: No thyromegaly. Vascular: No JVD. Cardiovascular:      Rate and Rhythm: Normal rate and regular rhythm. Pulses: Normal pulses. Heart sounds: Normal heart sounds. Pulmonary:      Effort: Pulmonary effort is normal. No respiratory distress. Breath sounds: Normal breath sounds. No wheezing. Abdominal:      General: Abdomen is flat. Bowel sounds are normal. There is no distension. Palpations: Abdomen is soft. Tenderness: There is no abdominal tenderness. Comments: Bowel sounds slightly hyperactive. Musculoskeletal:         General: Normal range of motion.       Cervical back: Normal range of motion and neck supple. Neurological:      Mental Status: She is alert. Motor: No abnormal muscle tone. Psychiatric:         Mood and Affect: Mood normal.         Behavior: Behavior normal.         Thought Content: Thought content normal.      Comments: PHQ score 11, mild depression. ASSESSMENT and PLAN  Diagnoses and all orders for this visit:    1. Essential hypertension    Elevated blood pressure. Advised to watch salt intake, she is having a lot of popcorn. We will change,  -     triamterene-hydroCHLOROthiazide (DYAZIDE) 37.5-25 mg per capsule; Take 1 Capsule by mouth daily. DC HCTZ  -     bisoprolol (ZEBETA) 5 mg tablet; Take 1 Tablet by mouth daily. DC Ziac  Advised to monitor blood pressure, if blood pressure over 130/80, need to call me back earlier. 2. Mixed hyperlipidemia    We will check,  -     simvastatin (ZOCOR) 20 mg tablet; TAKE 1 TABLET BY MOUTH AT  NIGHT  -     METABOLIC PANEL, COMPREHENSIVE  -     LIPID PANEL    3. Obesity (BMI 30.0-34. 9)  Addressed weight, diet and exercise with patient. Decrease carbohydrates (white foods, sweet foods, sweet drinks and alcohol), increase green leafy vegetables and protein (lean meats and beans) with each meal. Avoid fried foods. Eat 3-5 small meals daily. Do not skip meals. Increase water intake. Increase physical activity to 30 minutes daily for health benefit or 60 minutes daily to prevent weight regain, as tolerated. Get 7-8 hours uninterrupted sleep nightly. 4. Vitamin D deficiency  -     VITAMIN D, 25 HYDROXY    5. Prediabetes    Be on low-carb diet and exercise. Will check,  -     METABOLIC PANEL, COMPREHENSIVE  -     HEMOGLOBIN A1C WITH EAG    6. Smoking    Smoking 5 cigarettes a day, advised to quit. 7. Depression, unspecified depression type    PHQ score slightly high, she is probably grieving. Will monitor for now. No medication for now. Discussed expected course/resolution/complications of diagnosis in detail with patient. Medication risks/benefits/costs/interactions/alternatives discussed with patient. Discussed COVID-19 infection precaution with patient. Pt was given an after visit summary which includes diagnoses, current medications & vitals. Pt expressed understanding with the diagnosis and plan.

## 2022-11-01 LAB
25(OH)D3+25(OH)D2 SERPL-MCNC: 52.4 NG/ML (ref 30–100)
ALBUMIN SERPL-MCNC: 4.5 G/DL (ref 3.8–4.8)
ALBUMIN/GLOB SERPL: 1.7 {RATIO} (ref 1.2–2.2)
ALP SERPL-CCNC: 113 IU/L (ref 44–121)
ALT SERPL-CCNC: 16 IU/L (ref 0–32)
AST SERPL-CCNC: 20 IU/L (ref 0–40)
BILIRUB SERPL-MCNC: 0.3 MG/DL (ref 0–1.2)
BUN SERPL-MCNC: 11 MG/DL (ref 8–27)
BUN/CREAT SERPL: 15 (ref 12–28)
CALCIUM SERPL-MCNC: 9.6 MG/DL (ref 8.7–10.3)
CHLORIDE SERPL-SCNC: 99 MMOL/L (ref 96–106)
CHOLEST SERPL-MCNC: 182 MG/DL (ref 100–199)
CO2 SERPL-SCNC: 25 MMOL/L (ref 20–29)
CREAT SERPL-MCNC: 0.73 MG/DL (ref 0.57–1)
EGFR: 91 ML/MIN/1.73
EST. AVERAGE GLUCOSE BLD GHB EST-MCNC: 126 MG/DL
GLOBULIN SER CALC-MCNC: 2.7 G/DL (ref 1.5–4.5)
GLUCOSE SERPL-MCNC: 127 MG/DL (ref 70–99)
HBA1C MFR BLD: 6 % (ref 4.8–5.6)
HDLC SERPL-MCNC: 63 MG/DL
IMP & REVIEW OF LAB RESULTS: NORMAL
LDLC SERPL CALC-MCNC: 107 MG/DL (ref 0–99)
POTASSIUM SERPL-SCNC: 4 MMOL/L (ref 3.5–5.2)
PROT SERPL-MCNC: 7.2 G/DL (ref 6–8.5)
SODIUM SERPL-SCNC: 138 MMOL/L (ref 134–144)
TRIGL SERPL-MCNC: 66 MG/DL (ref 0–149)
VLDLC SERPL CALC-MCNC: 12 MG/DL (ref 5–40)

## 2022-11-02 NOTE — PROGRESS NOTES
Slightly elevated LDL, be on low-cholesterol diet and exercise. Vitamin D level normal.  Prediabetic, watch carbohydrate.

## 2022-11-18 ENCOUNTER — TELEPHONE (OUTPATIENT)
Dept: INTERNAL MEDICINE CLINIC | Age: 67
End: 2022-11-18

## 2022-11-18 NOTE — TELEPHONE ENCOUNTER
Pressure in head/ light headed/ no vision changes. Started new med two weeks ago.  Walked downstairs and ate lunch-current bp just taken was: 135/81

## 2022-11-18 NOTE — TELEPHONE ENCOUNTER
Returned call to patient, she starts that when she stands up she has pressure in her temples, she feels lightheaded when this happens. This has been going on all week since  changed her BP medication, she has been trying drink plenty of water , she also states that she has issues with taking capsules .  Will talk with provider and call pt back

## 2022-11-18 NOTE — TELEPHONE ENCOUNTER
Called and and spoke with pt, she will resume HCTZ  and Bisoprolol she will monitor BP and keep a log and call with readings, then will set up a f/up with .

## 2022-11-18 NOTE — TELEPHONE ENCOUNTER
D/C triamterene-HCTZ. Resume HCTZ 25 mg po daily. Monitor BP daily if able and keep log.   Follow-up with Dr. Erica Demarco

## 2022-11-30 ENCOUNTER — TELEPHONE (OUTPATIENT)
Dept: INTERNAL MEDICINE CLINIC | Age: 67
End: 2022-11-30

## 2022-11-30 NOTE — TELEPHONE ENCOUNTER
Patient called requesting a call back. She wants to continue the conversation that she had with the nurse previously about her blood pressure and medications. Please call her back at [phone]

## 2022-11-30 NOTE — TELEPHONE ENCOUNTER
Call placed to patient, patient stated she was hoping to speak to our Nurse Lead about on going issue since she is aware of whats going on with patient. Tried to progress with patient but Patient would not give me info to relay to Nurse Lead. Patient prefers a call back from her. Patient did share that for two weeks she has been experiencing pressure in head, mucus is green with blood. Blood is concerning patient, is having ongoing issue with sinus pressure that travels to head.

## 2022-11-30 NOTE — TELEPHONE ENCOUNTER
Called and spoke with pt, she is taking  HCTZ 25 mg and Bisoprolol separately and has been keeping a log of her blood pressures. She has also changed things in her diet has recommneded by  at her last visit . Here are her readings  131/77    134/69 137/66  142/78  123/71 139/73  124/70  140/68        Patient also having nasal congestion- advised to use saline to help, she will try.        Patient also have gastric upset states she ate some spicy food over the week and thinks that's the cause, she said this happened before and  told her to eat plain yogurt, I also advised her to try OTC Maalox

## 2022-12-05 NOTE — TELEPHONE ENCOUNTER
Call placed to patient and left VM that provider wants her to continue BP medications, her BP reading look good.

## 2023-01-09 DIAGNOSIS — I10 ESSENTIAL HYPERTENSION: ICD-10-CM

## 2023-01-09 RX ORDER — TRIAMTERENE AND HYDROCHLOROTHIAZIDE 37.5; 25 MG/1; MG/1
1 CAPSULE ORAL DAILY
Qty: 30 CAPSULE | Refills: 3 | Status: SHIPPED | OUTPATIENT
Start: 2023-01-09

## 2023-01-09 NOTE — TELEPHONE ENCOUNTER
Please return call to discuss which blood pressure medication patient needs to refill due to issues in the past.     Last OV: 10/31/2022  Next OV: 05/01/2023

## 2023-01-13 RX ORDER — FLUTICASONE PROPIONATE 50 MCG
1 SPRAY, SUSPENSION (ML) NASAL DAILY
Qty: 1 EACH | Refills: 5 | Status: SHIPPED | OUTPATIENT
Start: 2023-01-13

## 2023-01-13 NOTE — TELEPHONE ENCOUNTER
Pt has been purchasing flonase over the counter. She states it is getting expensive and wants to know if she can get prescription for this?   Lov:10/31/22  Nov:5/1/23

## 2023-01-16 ENCOUNTER — TELEPHONE (OUTPATIENT)
Dept: INTERNAL MEDICINE CLINIC | Age: 68
End: 2023-01-16

## 2023-01-16 NOTE — TELEPHONE ENCOUNTER
Patient states that there was confusion on meds. She believes that she wasn't feeling well because she may have been taking bisoprolol-HCTZ and triamterene-HCTZ at the same time (instead of bisoprolol alone)      Her BP is now around 126/64 now with taking bisoprolol 5mg and HCTZ 25mg (she stopped taking the triamterene-HCTZ).

## 2023-01-16 NOTE — TELEPHONE ENCOUNTER
Sent 23 to pharmacy         fluticasone propionate (FLONASE) 50 mcg/actuation nasal spray [[id number]]     Order Details  Dose: 1 Spray Route: Both Nostrils Frequency: DAILY   Dispense Quantity: 1 Each Refills: 5          Si Spray by Both Nostrils route daily.          Start Date: 23 End Date: --   Written Date: 23 Expiration Date: --   Original Order:  fluticasone propionate (FLONASE) 50 mcg/actuation nasal spray [[id number]]   Providers    Authorizing Provider:    Fabián Monzon, [address] [place] [address] Cathy Pedraza,[address] [place] [address]   Phone:  [phone]   Fax:  [phone]   ANN #:  [id number]   NPI:  [id number]

## 2023-01-17 NOTE — TELEPHONE ENCOUNTER
Rohan Tam MD  to Me    SA    3:22 PM  If pressure stable, she do not have to take triamterene hydrochlorothiazide. Continue only on bisoprolol hydrochlorothiazide      Miriam Shearer was called and verbalized understanding on note below.

## 2023-02-02 ENCOUNTER — TELEPHONE (OUTPATIENT)
Dept: INTERNAL MEDICINE CLINIC | Age: 68
End: 2023-02-02

## 2023-02-02 NOTE — TELEPHONE ENCOUNTER
Spoke with patient. Patient had pain in her head, lasted about a \"minute\" about 45 minutes. She has been taking this medication only 1 day: bisoprolol-HTCZ 5-6. 25. She states that she only took this. Instructed to take BP tonight and also tomorrow before taking medication, call tomorrow with BP readings.

## 2023-02-02 NOTE — TELEPHONE ENCOUNTER
Patient requesting a call back at [phone]. She started a new medication and was told to call us when she started it. She says she is feeling a little strange after taking it.

## 2023-02-03 ENCOUNTER — TELEPHONE (OUTPATIENT)
Dept: INTERNAL MEDICINE CLINIC | Age: 68
End: 2023-02-03

## 2023-02-03 NOTE — TELEPHONE ENCOUNTER
Pt told to call [facility] today for readings. Please call patient regarding her blood pressure readings.

## 2023-02-03 NOTE — TELEPHONE ENCOUNTER
BP numbers were good this AM: 128/67, /72    She took the bisoprolol-HCTZ combo and thinks it worked well.        Future Appointments   Date Time Provider Donavan Bhandari   2/6/2023 10:15 AM Elva Sprague MD [facility] AMB   2/8/2023 10:15 AM Solis Ferreira MD [facility] AMB   5/1/2023  8:45 AM Elva Sprague MD [facility] BS AMB

## 2023-02-06 ENCOUNTER — VIRTUAL VISIT (OUTPATIENT)
Dept: INTERNAL MEDICINE CLINIC | Age: 68
End: 2023-02-06
Payer: MEDICARE

## 2023-02-06 DIAGNOSIS — C54.1 ENDOMETRIAL CANCER (HCC): ICD-10-CM

## 2023-02-06 DIAGNOSIS — I10 ESSENTIAL HYPERTENSION: ICD-10-CM

## 2023-02-06 DIAGNOSIS — Z12.39 SCREENING BREAST EXAMINATION: ICD-10-CM

## 2023-02-06 DIAGNOSIS — E66.01 SEVERE OBESITY (BMI 35.0-39.9) WITH COMORBIDITY (HCC): ICD-10-CM

## 2023-02-06 DIAGNOSIS — R73.03 PREDIABETES: ICD-10-CM

## 2023-02-06 DIAGNOSIS — Z78.0 POST-MENOPAUSE: Primary | ICD-10-CM

## 2023-02-06 PROCEDURE — G8427 DOCREV CUR MEDS BY ELIG CLIN: HCPCS | Performed by: INTERNAL MEDICINE

## 2023-02-06 PROCEDURE — G9899 SCRN MAM PERF RSLTS DOC: HCPCS | Performed by: INTERNAL MEDICINE

## 2023-02-06 PROCEDURE — 1101F PT FALLS ASSESS-DOCD LE1/YR: CPT | Performed by: INTERNAL MEDICINE

## 2023-02-06 PROCEDURE — G8417 CALC BMI ABV UP PARAM F/U: HCPCS | Performed by: INTERNAL MEDICINE

## 2023-02-06 PROCEDURE — 3017F COLORECTAL CA SCREEN DOC REV: CPT | Performed by: INTERNAL MEDICINE

## 2023-02-06 PROCEDURE — 1090F PRES/ABSN URINE INCON ASSESS: CPT | Performed by: INTERNAL MEDICINE

## 2023-02-06 PROCEDURE — G8536 NO DOC ELDER MAL SCRN: HCPCS | Performed by: INTERNAL MEDICINE

## 2023-02-06 PROCEDURE — G8399 PT W/DXA RESULTS DOCUMENT: HCPCS | Performed by: INTERNAL MEDICINE

## 2023-02-06 PROCEDURE — 99214 OFFICE O/P EST MOD 30 MIN: CPT | Performed by: INTERNAL MEDICINE

## 2023-02-06 PROCEDURE — G8432 DEP SCR NOT DOC, RNG: HCPCS | Performed by: INTERNAL MEDICINE

## 2023-02-06 RX ORDER — BISOPROLOL FUMARATE AND HYDROCHLOROTHIAZIDE 5; 6.25 MG/1; MG/1
1 TABLET ORAL DAILY
COMMUNITY
End: 2023-02-06 | Stop reason: ALTCHOICE

## 2023-02-06 RX ORDER — BISOPROLOL FUMARATE 5 MG/1
5 TABLET, FILM COATED ORAL DAILY
Qty: 90 TABLET | Refills: 1 | Status: SHIPPED | OUTPATIENT
Start: 2023-02-06

## 2023-02-06 NOTE — ACP (ADVANCE CARE PLANNING)

## 2023-02-06 NOTE — PATIENT INSTRUCTIONS
Medicare Wellness Visit, Female     The best way to live healthy is to have a lifestyle where you eat a well-balanced diet, exercise regularly, limit alcohol use, and quit all forms of tobacco/nicotine, if applicable. Regular preventive services are another way to keep healthy. Preventive services (vaccines, screening tests, monitoring & exams) can help personalize your care plan, which helps you manage your own care. Screening tests can find health problems at the earliest stages, when they are easiest to treat. [facility] follows the current, evidence-based guidelines published by the [facility] Velasquez ([facility]) when recommending preventive services for our patients. Because we follow these guidelines, sometimes recommendations change over time as research supports it. (For example, mammograms used to be recommended annually. Even though Medicare will still pay for an annual mammogram, the newer guidelines recommend a mammogram every two years for women of average risk). Of course, you and your doctor may decide to screen more often for some diseases, based on your risk and your co-morbidities (chronic disease you are already diagnosed with). Preventive services for you include:  - Medicare offers their members a free annual wellness visit, which is time for you and your primary care provider to discuss and plan for your preventive service needs.  Take advantage of this benefit every year!    -Over the age of 72 should receive the recommended pneumonia vaccines.    -All adults should have a flu vaccine yearly.  -All adults should have a tetanus vaccine every 10 years.   -Over the age 48 should receive the shingles vaccines.        -All adults should be screened once for Hepatitis C.  -All adults age 38-68 who are overweight should have a diabetes screening test once every three years.   -Other screening tests and preventive services for persons with diabetes include: an eye exam to screen for diabetic retinopathy, a kidney function test, a foot exam, and stricter control over your cholesterol.   -Cardiovascular screening for adults with routine risk involves an electrocardiogram (ECG) at intervals determined by your doctor.     -Colorectal cancer screenings should be done for adults age 39-70 with no increased risk factors for colorectal cancer. There are a number of acceptable methods of screening for this type of cancer. Each test has its own benefits and drawbacks. Discuss with your doctor what is most appropriate for you during your annual wellness visit. The different tests include: colonoscopy (considered the best screening method), a fecal occult blood test, a fecal DNA test, and sigmoidoscopy.    -Lung cancer screening is recommended annually with a low dose CT scan for adults between age 54 and 68, who have smoked at least 30 pack years (equivalent of 1 pack per day for 30 days), and who is a current smoker or quit less than 15 years ago.    -A bone mass density test is recommended when a woman turns 65 to screen for osteoporosis. This test is only recommended one time, as a screening. Some providers will use this same test as a disease monitoring tool if you already have osteoporosis. -Breast cancer screenings are recommended every other year for women of normal risk, age 54-69.    -Cervical cancer screenings for women over age 72 are only recommended with certain risk factors.      Here is a list of your current Health Maintenance items (your personalized list of preventive services) with a due date:  Health Maintenance Due   Topic Date Due    Shingles Vaccine (1 of 2) Never done    COVID-19 Vaccine (5 - Booster) 03/05/2022

## 2023-02-06 NOTE — PROGRESS NOTES
This is the Subsequent Medicare Annual Wellness Exam, performed 12 months or more after the Initial AWV or the last Subsequent AWV    I have reviewed the patient's medical history in detail and updated the computerized patient record. Assessment/Plan   Education and counseling provided:  Are appropriate based on today's review and evaluation  End-of-Life planning (with patient's consent)  Pneumococcal Vaccine  Screening Mammography  Screening Pap and pelvic (covered once every 2 years)  Colorectal cancer screening tests  Bone mass measurement (DEXA)  Screening for glaucoma    1. Essential hypertension  -     bisoprolol (ZEBETA) 5 mg tablet; Take 1 Tablet by mouth daily. DC Ziac or bisoprolol with HCTZ, Normal, Disp-90 Tablet, R-1  2. Severe obesity (BMI 35.0-39. 9) with comorbidity ([facility]  3. Endometrial cancer ([facility]       Depression Risk Factor Screening     3 most recent PHQ Screens 10/31/2022   Little interest or pleasure in doing things More than half the days   Feeling down, depressed, irritable, or hopeless More than half the days   Total Score PHQ 2 4   Trouble falling or staying asleep, or sleeping too much Several days   Feeling tired or having little energy Several days   Poor appetite, weight loss, or overeating More than half the days   Feeling bad about yourself - or that you are a failure or have let yourself or your family down Several days   Trouble concentrating on things such as school, work, reading, or watching TV Several days   Moving or speaking so slowly that other people could have noticed; or the opposite being so fidgety that others notice Several days   Thoughts of being better off dead, or hurting yourself in some way Not at all   PHQ 9 Score 11   How difficult have these problems made it for you to do your work, take care of your home and get along with others Somewhat difficult       Alcohol & Drug Abuse Risk Screen    Do you average more than 1 drink per night or more than 7 drinks a week:  No    On any one occasion in the past three months have you have had more than 3 drinks containing alcohol:  Yes          Functional Ability and Level of Safety    Hearing: Hearing is good. Activities of Daily Living: The home contains: no safety equipment. Patient does total self care      Ambulation: with no difficulty     Fall Risk:  Fall Risk Assessment, last 12 mths 10/31/2022   Able to walk? Yes   Fall in past 12 months? 0   Do you feel unsteady? 0   Are you worried about falling 0      Abuse Screen:  Patient is not abused       Cognitive Screening    Has your family/caregiver stated any concerns about your memory: no     Cognitive Screening: Normal - MMSE (Mini Mental Status Exam)    Health Maintenance Due     Health Maintenance Due   Topic Date Due    Shingles Vaccine (1 of 2) Never done    COVID-19 Vaccine (5 - Booster) 03/05/2022       Patient Care Team   Patient Care Team:  Mikaela Thakur MD as PCP - General (Internal Medicine Physician)  Mikaela Thakur [facility]  Maninedr Hwang RN as Ambulatory Care Manager (Internal Medicine Physician)    History     Patient Active Problem List   Diagnosis Code    HTN (hypertension) I10    Mixed hyperlipidemia E78.2    Obesity (BMI 30.0-34. 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  DC Ziac or bisoprolol with HCTZ 90 Tablet 1    fluticasone propionate (FLONASE) 50 mcg/actuation nasal spray 1 Sun by Both Nostrils route daily. 1 Each 5    simvastatin (ZOCOR) 20 mg tablet TAKE 1 TABLET BY MOUTH AT  NIGHT 30 Tablet 0    clotrimazole-betamethasone (LOTRISONE) topical cream APPLY  CREAM TOPICALLY TO AFFECTED AREA TWICE DAILY 60 g 0    diclofenac (VOLTAREN) 1 % gel Apply  to affected area two (2) times daily as needed for Pain. 100 g 2    aspirin delayed-release 81 mg tablet Take 81 mg by mouth daily. Pt stated she takes couple times a week      cyanocobalamin 1,000 mcg tablet Take 1,000 mcg by mouth daily. triamterene-hydroCHLOROthiazide (DYAZIDE) 37.5-25 mg per capsule Take 1 Capsule by mouth daily. DC HCTZ (Patient not taking: Reported on 2/6/2023) 30 Capsule 3    cholecalciferol (VITAMIN D3) 25 mcg (1,000 unit) cap Take 2,000 Units by mouth every other day. No Known Allergies    Family History   Problem Relation Age of Onset    Alzheimer's Disease Mother     Hypertension Sister     Asthma Brother     Cancer Maternal Aunt         bone ca    Hypertension Brother     Anesth Problems Neg Hx      Social History     Tobacco Use    Smoking status: Every Day     Packs/day: 0.30     Years: 40.00     Pack years: 12.00     Types: Cigarettes    Smokeless tobacco: Never   Substance Use Topics    Alcohol use:  Yes     Alcohol/week: 0.0 standard drinks     Comment: Yulia Ramires MD

## 2023-02-06 NOTE — PROGRESS NOTES
Fernanda Elias is a 79 y.o. female who was seen by synchronous (real-time) audio-video technology on 2/6/2023 for Medication Question and Hypertension        Assessment & Plan:   Diagnoses and all orders for this visit:    1. Post-menopause    Be on calcium rich diet and exercise. We will check,  -     DEXA BONE DENSITY STUDY AXIAL; Future    2. Essential hypertension    She is confused about her blood pressure medication. He has been taking bisoprolol with hydrochlorothiazide. I had discontinued this medication. She is supposed to take bisoprolol 5 mg a day and triamterene with hydrochlorothiazide 1 tablet a day. Blood pressure has been elevated since she is not on triamterene with hydrochlorothiazide. Will order,  -     bisoprolol (ZEBETA) 5 mg tablet; Take 1 Tablet by mouth daily. DC Ziac or bisoprolol with HCTZ  -     METABOLIC PANEL, COMPREHENSIVE; Future  B-diet. Do lab work after couple of weeks with any medication. 3. Severe obesity (BMI 35.0-39. 9) [facility]  Addressed weight, diet and exercise with patient. Decrease carbohydrates (white foods, sweet foods, sweet drinks and alcohol), increase green leafy vegetables and protein (lean meats and beans) with each meal. Avoid fried foods. Eat 3-5 small meals daily. Do not skip meals. Increase water intake. Increase physical activity to 30 minutes daily for health benefit or 60 minutes daily to prevent weight regain, as tolerated. Get 7-8 hours uninterrupted sleep nightly. 4. [facility]  Seeing namita Wilkinson. 5. Screening breast examination    Will order,  -     YARELI MAMMO BI SCREENING INCL CAD; Future      8. Prediabetes    Be On low-carb diet and exercise. We will check  -     HEMOGLOBIN A1C WITH EAG; Future      I spent at least 30 minutes on this visit with this established patient. Subjective:   Ms. Nathalie Veloz is here for follow-up. No history of elevated blood pressure.   Today she is confused about her blood pressure medication. Not taking a.m. Dyazide, only taking bisoprolol with hydrochlorothiazide. Blood pressures running high. Denies any headache chest pain or palpitation. She is prediabetic, watching carbohydrate. Did repeat lab work. She is obese, trying to watch diet and exercise. Has history of endometrial cancer, she is cancer free right now, following up with oncologist.  Need bone density. Need mammogram.      Prior to Admission medications    Medication Sig Start Date End Date Taking? Authorizing Provider   bisoprolol (ZEBETA) 5 mg tablet Take 1 Tablet by mouth daily. DC Ziac or bisoprolol with HCTZ 2/6/23  Yes Coco Monson MD   fluticasone propionate (FLONASE) 50 mcg/actuation nasal spray 1 [place] by Both Nostrils route daily. 1/13/23  Yes Coco Monson MD   simvastatin (ZOCOR) 20 mg tablet TAKE 1 TABLET BY MOUTH AT  NIGHT 10/31/22  Yes Coco Monson MD   clotrimazole-betamethasone (LOTRISONE) topical cream APPLY  CREAM TOPICALLY TO AFFECTED AREA TWICE DAILY 7/18/22  Yes Elisa Man NP   diclofenac (VOLTAREN) 1 % gel Apply  to affected area two (2) times daily as needed for Pain. 3/30/22  Yes Coco Monson MD   aspirin delayed-release 81 mg tablet Take 81 mg by mouth daily. Pt stated she takes couple times a week   Yes Provider, Historical   cyanocobalamin 1,000 mcg tablet Take 1,000 mcg by mouth daily. Yes Provider, Historical   bisoprolol-hydroCHLOROthiazide (ZIAC) 5-6.25 mg per tablet Take 1 Tablet by mouth daily. 2/6/23  Provider, Historical   triamterene-hydroCHLOROthiazide (DYAZIDE) 37.5-25 mg per capsule Take 1 Capsule by mouth daily. DC HCTZ  Patient not taking: Reported on 2/6/2023 1/9/23   Sylwia Allen NP   bisoprolol (ZEBETA) 5 mg tablet Take 1 Tablet by mouth daily. Eli Mince 10/31/22 2/6/23  Coco Monson MD   cholecalciferol (VITAMIN D3) 25 mcg (1,000 unit) cap Take 2,000 Units by mouth every other day.     Provider, Historical   Cetirizine 10 mg cap Take 10 mg by mouth Every morning. 2/6/23  Provider, Historical   calcium carbonate (TUMS) 200 mg calcium (500 mg) chew Take 1 Tab by mouth daily as needed. 2/6/23  Provider, Historical     Past Medical History:   Diagnosis Date    Complex endometrial hyperplasia     COVID-19 vaccine series completed 3/22/21,  4/23/21    MODERNA    Herpes     High cholesterol     Hypercholesterolemia     Hypertension     Post-menopausal bleeding        ROS negative.     Objective:     Patient-Reported Vitals 2/6/2023   Patient-Reported Weight -   Patient-Reported Height -   Patient-Reported Pulse -   Patient-Reported Systolic  2.69   Patient-Reported Diastolic -   Patient-Reported LMP -            Constitutional: [x] Appears well-developed and well-nourished [x] No apparent distress      [] Abnormal -     Mental status: [x] Alert and awake  [x] Oriented to person/place/time [x] Able to follow commands    [] Abnormal -     Eyes:   EOM    [x]  Normal    [] Abnormal -   Sclera  [x]  Normal    [] Abnormal -          Discharge [x]  None visible   [] Abnormal -     HENT: [x] Normocephalic, atraumatic  [] Abnormal -   [x] Mouth/Throat: Mucous membranes are moist    External Ears [x] Normal  [] Abnormal -    Neck: [x] No visualized mass [] Abnormal -     Pulmonary/Chest: [x] Respiratory effort normal   [x] No visualized signs of difficulty breathing or respiratory distress        [] Abnormal -      Musculoskeletal:   [x] Normal gait with no signs of ataxia         [x] Normal range of motion of neck        [] Abnormal -     Neurological:        [x] No Facial Asymmetry (Cranial nerve 7 motor function) (limited exam due to video visit)          [x] No gaze palsy        [] Abnormal -          Skin:        [x] No significant exanthematous lesions or discoloration noted on facial skin         [] Abnormal -            Psychiatric:       [x] Normal Affect [] Abnormal -        [x] No Hallucinations    Other pertinent observable physical exam findings:-        We discussed the expected course, resolution and complications of the diagnosis(es) in detail. Medication risks, benefits, costs, interactions, and alternatives were discussed as indicated. I advised her to contact the office if her condition worsens, changes or fails to improve as anticipated. She expressed understanding with the diagnosis(es) and plan. Yamila Lin, was evaluated through a synchronous (real-time) audio-video encounter. The patient (or guardian if applicable) is aware that this is a billable service, which includes applicable co-pays. This Virtual Visit was conducted with patient's (and/or legal guardian's) consent. The visit was conducted pursuant to the emergency declaration under the [address] [place] [address] authority and the [employer] and [employer] General Act. Patient identification was verified, and a caregiver was present when appropriate. The patient was located at: Home: [place] [phone]  The provider was located at:  Facility (Appt Department): [address] [place] [id number]  Raul Wolff MD

## 2023-02-08 ENCOUNTER — OFFICE VISIT (OUTPATIENT)
Dept: GYNECOLOGY | Age: 68
End: 2023-02-08
Payer: MEDICARE

## 2023-02-08 VITALS
SYSTOLIC BLOOD PRESSURE: 161 MMHG | BODY MASS INDEX: 36.12 KG/M2 | HEIGHT: 64 IN | HEART RATE: 52 BPM | WEIGHT: 211.6 LBS | DIASTOLIC BLOOD PRESSURE: 75 MMHG

## 2023-02-08 DIAGNOSIS — C54.1 ENDOMETRIAL CANCER (HCC): Primary | ICD-10-CM

## 2023-02-08 PROCEDURE — G8399 PT W/DXA RESULTS DOCUMENT: HCPCS | Performed by: OBSTETRICS & GYNECOLOGY

## 2023-02-08 PROCEDURE — G8417 CALC BMI ABV UP PARAM F/U: HCPCS | Performed by: OBSTETRICS & GYNECOLOGY

## 2023-02-08 PROCEDURE — G8432 DEP SCR NOT DOC, RNG: HCPCS | Performed by: OBSTETRICS & GYNECOLOGY

## 2023-02-08 PROCEDURE — G8427 DOCREV CUR MEDS BY ELIG CLIN: HCPCS | Performed by: OBSTETRICS & GYNECOLOGY

## 2023-02-08 PROCEDURE — 3017F COLORECTAL CA SCREEN DOC REV: CPT | Performed by: OBSTETRICS & GYNECOLOGY

## 2023-02-08 PROCEDURE — G9899 SCRN MAM PERF RSLTS DOC: HCPCS | Performed by: OBSTETRICS & GYNECOLOGY

## 2023-02-08 PROCEDURE — 3077F SYST BP >= 140 MM HG: CPT | Performed by: OBSTETRICS & GYNECOLOGY

## 2023-02-08 PROCEDURE — G8536 NO DOC ELDER MAL SCRN: HCPCS | Performed by: OBSTETRICS & GYNECOLOGY

## 2023-02-08 PROCEDURE — 3078F DIAST BP <80 MM HG: CPT | Performed by: OBSTETRICS & GYNECOLOGY

## 2023-02-08 PROCEDURE — 1090F PRES/ABSN URINE INCON ASSESS: CPT | Performed by: OBSTETRICS & GYNECOLOGY

## 2023-02-08 PROCEDURE — 99214 OFFICE O/P EST MOD 30 MIN: CPT | Performed by: OBSTETRICS & GYNECOLOGY

## 2023-02-08 PROCEDURE — 1123F ACP DISCUSS/DSCN MKR DOCD: CPT | Performed by: OBSTETRICS & GYNECOLOGY

## 2023-02-08 PROCEDURE — 1101F PT FALLS ASSESS-DOCD LE1/YR: CPT | Performed by: OBSTETRICS & GYNECOLOGY

## 2023-02-08 NOTE — PROGRESS NOTES
[address] [place] [address] Mathias Moritz [address], [facility]  P [phone]  F [phone]    Office Note  Patient ID:  Name:  Layla Last  MRN:  [id number]  :  1955/67 y.o. Date:  2023      HISTORY OF PRESENT ILLNESS:  Ms. Layla Last is a 79 y.o. female with a working diagnosis of complex endometrial hyperplasia with atypia. On 2021 underwent Total laparoscopic hysterectomy with bilateral salpingo-oophorectomy, Bilateral sentinel lymph node mapping and biopsy, lysis of adhesions. Final pathology consistent with Stage Ia, FIGO Grade 1 endometrial cancer. 1mm myometrial invasion (<50%). Negative LVSI. Negative SLN. Presents today for continued surveillance. Doing well without complaints. Denies vaginal bleeding/discharge, abdominal/pelvic pain, nausea, vomiting, constipation, diarrhea, CP, SOB, hematuria, hematochezia, change in appetite or bowel movements, bloating, fevers, chills, or urinary symptoms. Initial History:  Ms. Layla Last is a 79 y.o. postmenopausal female who presents as a new patient from Dr. Barbara He for complex endometrial hyperplasia with atypia. The patient reports life stressors last summer, and during all of that she reports having a light period in 2020. She presented to her PCPs office in 2020 per patient and reported that she had a normal pap smear and no further workup was done. She later presented in 2021 to her PCP and saw her doctor rather than the NP who recommended a referral to an [place]. She ultimately saw Dr. Barbara He and underwent an endometrial biopsy on 2021 consistent with complex endometrial hyperplasia with atypia. Reports very minimal spotting now. Denies pelvic or abdominal pain/bloating. Denies CP or SOB. Denies nausea or vomiting. Denies change in appetite or bowel habits. Pertinent PMH/PSH: Smoker, HTN, HPL      Active, no restrictions.      Pathology Review:  2021:  * * *FINAL PATHOLOGIC DIAGNOSIS* * *  1. Uterus (fragmented), cervix, bilateral fallopian tubes and ovaries;   simple hysterectomy, BSO:        Endometrium: Endometrioid adenocarcinoma, FIGO grade 1, see synoptic   report        Myometrium: Leiomyomas (228 g specimen)        Serosa: Fibrous serosal adhesions involving uterus and adnexa        2. Right pelvic sentinel lymph node, biopsy:        One lymph node, negative for metastatic carcinoma, levels and   cytokeratin stain examined (0/1)  3. Left pelvic sentinel lymph node, biopsy:        One lymph node, negative for metastatic carcinoma, levels and   cytokeratin stain examined (0/1)  ENDOMETRIUM    SPECIMEN       Procedure: Total hysterectomy and bilateral salpingo-oophorectomy       Specimen Integrity: Fragmented    TUMOR       Histologic Type: Endometrioid carcinoma, NOS       Histologic Grade: FIGO grade 1       Myometrial Invasion: Present; exact percentage invasion cannot be   determined           Reason Percentage of Invasion Cannot be Determined: Myometrial   thickness cannot             be determined: Specimen fragmented           Depth of invasion: 1 mm           Estimated Percentage of Myometrial Invasion: Less than 50%       Uterine Serosa Involvement: Not identified       Lower Uterine Segment Involvement: Not identified       Cervical Stromal Involvement: Not identified       Other Tissue / Organ Involvement: Not identified       Peritoneal Ascitic Fluid: Not submitted / unknown       Lymphovascular Invasion: Not identified    MARGINS       Margins: Not applicable    LYMPH NODES       Lymph Node Status:  All lymph nodes negative for tumor cells           Total Number of Pelvic Nodes Examined: 2           Number of Pelvic Hugo Nodes Examined: 2           Total Number of Para-aortic Nodes Examined: 0    PATHOLOGIC STAGE CLASSIFICATION (pTNM, AJCC 8th Edition)       Primary Tumor (pT): pT1a       Regional Lymph Nodes Modifier: (sn)       Regional Lymph Nodes (pN): pN0    FIGO STAGE       FIGO Stage: IA    ADDITIONAL FINDINGS       Additional Findings: Benign endometrial polyp  Assessment of mismatch repair and estrogen receptor pending, please see   addendum     5/11/2021:  FINAL PATHOLOGIC DIAGNOSIS   Endometrium, biopsy:   Complex endometrial hyperplasia with architectural atypia, 2 mm fragment (limited tissue)       ROS:  A comprehensive review of systems was negative except for that written in the History of Present Illness. 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    Vitamin D deficiency 04/17/2018    Smoking 04/17/2018    Obesity (BMI 30.0-34.9) 08/10/2016    HTN (hypertension) 12/17/2012    Mixed hyperlipidemia 12/17/2012     PMH:  Past Medical History:   Diagnosis Date    Complex endometrial hyperplasia     COVID-19 vaccine series completed 3/22/21,  4/23/21    MODERNA    Herpes     High cholesterol     Hypercholesterolemia     Hypertension     Post-menopausal bleeding       PSH:  Past Surgical History:   Procedure Laterality Date    HX COLONOSCOPY      HX GYN      HX HYSTERECTOMY  07/2021      Social History:  Social History     Tobacco Use    Smoking status: Every Day     Packs/day: 0.30     Years: 40.00     Pack years: 12.00     Types: Cigarettes    Smokeless tobacco: Never   Substance Use Topics    Alcohol use: Yes     Alcohol/week: 0.0 standard drinks     Comment: Socially      Family History:  Family History   Problem Relation Age of Onset    Alzheimer's Disease Mother     Hypertension Sister     Asthma Brother     Cancer Maternal Aunt         bone ca    Hypertension Brother     Anesth Problems Neg Hx       Medications: (reviewed)  Current Outpatient Medications   Medication Sig    bisoprolol (ZEBETA) 5 mg tablet Take 1 Tablet by mouth daily.  DC Ziac or bisoprolol with HCTZ    fluticasone propionate (FLONASE) 50 mcg/actuation nasal spray 1 [place] by Both Nostrils route daily. triamterene-hydroCHLOROthiazide (DYAZIDE) 37.5-25 mg per capsule Take 1 Capsule by mouth daily. DC HCTZ (Patient not taking: Reported on 2/6/2023)    simvastatin (ZOCOR) 20 mg tablet TAKE 1 TABLET BY MOUTH AT  NIGHT    clotrimazole-betamethasone (LOTRISONE) topical cream APPLY  CREAM TOPICALLY TO AFFECTED AREA TWICE DAILY    diclofenac (VOLTAREN) 1 % gel Apply  to affected area two (2) times daily as needed for Pain. aspirin delayed-release 81 mg tablet Take 81 mg by mouth daily. Pt stated she takes couple times a week    cyanocobalamin 1,000 mcg tablet Take 1,000 mcg by mouth daily. cholecalciferol (VITAMIN D3) 25 mcg (1,000 unit) cap Take 2,000 Units by mouth every other day. No current facility-administered medications for this visit. Allergies: (reviewed)  No Known Allergies       OBJECTIVE:  Physical Exam:  Visit Vitals  LMP 10/26/2020      General: Alert and oriented. No acute distress. Well-nourished  HEENT: No thyroid enlargment. Neck supple without restrictions. Sclera normal. Normal occular motion. Moist mucous membranes. Lymphatics: No evidence of axillary, cervical, or subclavicular adenopathy. Respiratory: clear to auscultation and percussion to the bases. No CVAT. Cardiovascular: regular rate and rhythm. No murmurs, rubs, or gallops. Gastrointestinal: soft, non-tender, non-distended, no masses or organomegaly. Well-healed incision. Musculoskeletal: normal gait. No joint tenderness, deformity or swelling. No muscular tenderness. Extremities: extremities normal, atraumatic, no cyanosis or edema. Pelvic: exam chaperoned by nurse. Normal appearing external genitalia. On speculum exam, the vagina is atrophic. The uterus and cervix are surgically absent. No evidence of masses or nodularity on bimanual exam. Deferred rectovaginal exam.   Neuro: Grossly intact.  Normal gait and movement. No acute deficit  Skin: No evidence of rashes or skin changes. IMPRESSION/PLAN:    Ms. Hauser is a 79 y.o. female with a working diagnosis of complex endometrial hyperplasia with atypia. On 7/12/2021 underwent Total laparoscopic hysterectomy with bilateral salpingo-oophorectomy, Bilateral sentinel lymph node mapping and biopsy, lysis of adhesions. Final pathology consistent with Stage Ia, FIGO Grade 1 endometrial cancer. 1mm myometrial invasion (<50%). Negative LVSI. Negative SLN. 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 MARILEE on exam today. Reassured patient. RTC in 4 months for continued surveillance. Reviewed precautionary symptoms to return sooner. All questions and concerns were addressed with the patient and she is comfortable with the plan. An electronic signature was used to authenticate this note.      Verona Carrillo MD

## 2023-02-08 NOTE — PROGRESS NOTES
3 month follow up for endometrial cancer ,  pt reports no abnormal spotting or bleeding, pt states no questions or concerns for today's visit    1. Have you been to the ER, urgent care clinic since your last visit? Hospitalized since your last visit?  no    2. Have you seen or consulted any other health care providers outside of the [address] [place] [address] since your last visit? Include any pap smears or colon screening.    no

## 2023-02-15 ENCOUNTER — TELEPHONE (OUTPATIENT)
Dept: INTERNAL MEDICINE CLINIC | Age: 68
End: 2023-02-15

## 2023-02-15 NOTE — TELEPHONE ENCOUNTER
Spoke with patient. [employer] will be requesting info from us. Wanted to let us know that she is being seen Dr. Helena Weaver (oncologist).

## 2023-02-15 NOTE — TELEPHONE ENCOUNTER
Patient requesting to a call back to speak directly with Jacquie Woo for insurance purposes.    Please return call at [phone]

## 2023-02-28 ENCOUNTER — TELEPHONE (OUTPATIENT)
Dept: INTERNAL MEDICINE CLINIC | Age: 68
End: 2023-02-28

## 2023-02-28 DIAGNOSIS — J30.9 ALLERGIC RHINITIS, UNSPECIFIED SEASONALITY, UNSPECIFIED TRIGGER: Primary | ICD-10-CM

## 2023-02-28 RX ORDER — FLUTICASONE PROPIONATE 50 MCG
1 SPRAY, SUSPENSION (ML) NASAL DAILY
Qty: 1 EACH | Refills: 5 | Status: SHIPPED | OUTPATIENT
Start: 2023-02-28

## 2023-02-28 NOTE — TELEPHONE ENCOUNTER
Requested Prescriptions     Pending Prescriptions Disp Refills    fluticasone propionate (FLONASE) 50 mcg/actuation nasal spray 1 Each 5     Si Spray by Both Nostrils route daily.          [facility], [address] [place] [address]  Phone: [phone] Fax: [phone]       2023 is LAST OFFICE VISIT     Future Appointments   Date Time Provider Donavan Bhandari   2023  8:45 AM Ramya Wilson MD [facility] BS AMB

## 2023-03-12 DIAGNOSIS — E78.2 MIXED HYPERLIPIDEMIA: ICD-10-CM

## 2023-03-12 RX ORDER — SIMVASTATIN 20 MG/1
TABLET, FILM COATED ORAL
Qty: 90 TABLET | Refills: 3 | Status: SHIPPED | OUTPATIENT
Start: 2023-03-12

## 2023-03-21 DIAGNOSIS — I10 ESSENTIAL HYPERTENSION: ICD-10-CM

## 2023-03-21 RX ORDER — TRIAMTERENE AND HYDROCHLOROTHIAZIDE 37.5; 25 MG/1; MG/1
1 CAPSULE ORAL DAILY
Qty: 30 CAPSULE | Refills: 3 | Status: SHIPPED | OUTPATIENT
Start: 2023-03-21

## 2023-04-22 DIAGNOSIS — Z78.0 POST-MENOPAUSE: Primary | ICD-10-CM

## 2023-04-25 LAB
ALBUMIN SERPL-MCNC: 4.3 G/DL (ref 3.8–4.8)
ALBUMIN/GLOB SERPL: 1.7 {RATIO} (ref 1.2–2.2)
ALP SERPL-CCNC: 120 IU/L (ref 44–121)
ALT SERPL-CCNC: 14 IU/L (ref 0–32)
AST SERPL-CCNC: 16 IU/L (ref 0–40)
BILIRUB SERPL-MCNC: 0.3 MG/DL (ref 0–1.2)
BUN SERPL-MCNC: 14 MG/DL (ref 8–27)
BUN/CREAT SERPL: 17 (ref 12–28)
CALCIUM SERPL-MCNC: 9.5 MG/DL (ref 8.7–10.3)
CHLORIDE SERPL-SCNC: 103 MMOL/L (ref 96–106)
CO2 SERPL-SCNC: 25 MMOL/L (ref 20–29)
CREAT SERPL-MCNC: 0.82 MG/DL (ref 0.57–1)
EGFRCR SERPLBLD CKD-EPI 2021: 78 ML/MIN/1.73
EST. AVERAGE GLUCOSE BLD GHB EST-MCNC: 128 MG/DL
GLOBULIN SER CALC-MCNC: 2.5 G/DL (ref 1.5–4.5)
GLUCOSE SERPL-MCNC: 138 MG/DL (ref 70–99)
HBA1C MFR BLD: 6.1 % (ref 4.8–5.6)
POTASSIUM SERPL-SCNC: 4.1 MMOL/L (ref 3.5–5.2)
PROT SERPL-MCNC: 6.8 G/DL (ref 6–8.5)
SODIUM SERPL-SCNC: 140 MMOL/L (ref 134–144)

## 2023-05-01 ENCOUNTER — OFFICE VISIT (OUTPATIENT)
Dept: INTERNAL MEDICINE CLINIC | Age: 68
End: 2023-05-01
Payer: MEDICARE

## 2023-05-01 VITALS
WEIGHT: 211.4 LBS | HEART RATE: 74 BPM | BODY MASS INDEX: 36.09 KG/M2 | DIASTOLIC BLOOD PRESSURE: 82 MMHG | SYSTOLIC BLOOD PRESSURE: 150 MMHG | HEIGHT: 64 IN | OXYGEN SATURATION: 98 % | RESPIRATION RATE: 16 BRPM | TEMPERATURE: 96.9 F

## 2023-05-01 DIAGNOSIS — F41.1 GAD (GENERALIZED ANXIETY DISORDER): ICD-10-CM

## 2023-05-01 DIAGNOSIS — I10 ESSENTIAL HYPERTENSION: ICD-10-CM

## 2023-05-01 DIAGNOSIS — E66.01 SEVERE OBESITY (BMI 35.0-39.9) WITH COMORBIDITY (HCC): ICD-10-CM

## 2023-05-01 DIAGNOSIS — E55.9 VITAMIN D DEFICIENCY: ICD-10-CM

## 2023-05-01 DIAGNOSIS — E78.2 MIXED HYPERLIPIDEMIA: ICD-10-CM

## 2023-05-01 DIAGNOSIS — R73.03 PREDIABETES: Primary | ICD-10-CM

## 2023-05-01 DIAGNOSIS — Z23 ENCOUNTER FOR IMMUNIZATION: ICD-10-CM

## 2023-05-01 PROCEDURE — G8399 PT W/DXA RESULTS DOCUMENT: HCPCS | Performed by: INTERNAL MEDICINE

## 2023-05-01 PROCEDURE — G8417 CALC BMI ABV UP PARAM F/U: HCPCS | Performed by: INTERNAL MEDICINE

## 2023-05-01 PROCEDURE — G8536 NO DOC ELDER MAL SCRN: HCPCS | Performed by: INTERNAL MEDICINE

## 2023-05-01 PROCEDURE — G8427 DOCREV CUR MEDS BY ELIG CLIN: HCPCS | Performed by: INTERNAL MEDICINE

## 2023-05-01 PROCEDURE — 99214 OFFICE O/P EST MOD 30 MIN: CPT | Performed by: INTERNAL MEDICINE

## 2023-05-01 PROCEDURE — 1101F PT FALLS ASSESS-DOCD LE1/YR: CPT | Performed by: INTERNAL MEDICINE

## 2023-05-01 PROCEDURE — 3017F COLORECTAL CA SCREEN DOC REV: CPT | Performed by: INTERNAL MEDICINE

## 2023-05-01 PROCEDURE — G8511 SCR DEP POS, NO PLAN DOC RNG: HCPCS | Performed by: INTERNAL MEDICINE

## 2023-05-01 PROCEDURE — G9899 SCRN MAM PERF RSLTS DOC: HCPCS | Performed by: INTERNAL MEDICINE

## 2023-05-01 PROCEDURE — 1090F PRES/ABSN URINE INCON ASSESS: CPT | Performed by: INTERNAL MEDICINE

## 2023-05-01 RX ORDER — TRIAMTERENE AND HYDROCHLOROTHIAZIDE 37.5; 25 MG/1; MG/1
1 CAPSULE ORAL DAILY
Qty: 90 CAPSULE | Refills: 1 | Status: SHIPPED | OUTPATIENT
Start: 2023-05-01

## 2023-05-01 RX ORDER — BUSPIRONE HYDROCHLORIDE 5 MG/1
5 TABLET ORAL
Qty: 60 TABLET | Refills: 2 | Status: SHIPPED | OUTPATIENT
Start: 2023-05-01

## 2023-05-01 NOTE — PROGRESS NOTES
HISTORY OF PRESENT ILLNESS  Kannan Mejia is a 79 y.o. female here for follow-up. She is obese, watching diet and exercise. Not able to lose weight. Lab work done, she is prediabetic, trying to watch carbohydrate. Has hypertension, blood pressure is elevated. She is monitoring blood pressure at home, BP and labs are fine at home. Denies chest pain palpitation shortness of breath. Need refill of medicine. Also on bisoprolol. Has elevated lipid, on statin. No myalgia. She suffers from anxiety. She is not willing to take medications for anxiety depression. Mammogram and bone density up-to-date. Hypertension     Cholesterol Problem    Follow Up Chronic Condition    Elbow Pain     Leg Pain       Review of Systems   Constitutional: Negative. HENT: Negative. Eyes: Negative. Respiratory: Negative. Cardiovascular: Negative. Gastrointestinal: Negative. Genitourinary: Negative. Musculoskeletal: Negative. Skin: Negative. Neurological: Negative. Endo/Heme/Allergies: Negative. Psychiatric/Behavioral:  The patient is nervous/anxious. Physical Exam  Constitutional:       Appearance: Normal appearance. She is obese. Eyes:      Extraocular Movements: Extraocular movements intact. Conjunctiva/sclera: Conjunctivae normal.      Pupils: Pupils are equal, round, and reactive to light. Cardiovascular:      Rate and Rhythm: Normal rate and regular rhythm. Pulses: Normal pulses. Heart sounds: Normal heart sounds. Pulmonary:      Effort: Pulmonary effort is normal.      Breath sounds: Normal breath sounds. Abdominal:      General: Abdomen is flat. Bowel sounds are normal.      Palpations: Abdomen is soft. Musculoskeletal:         General: No swelling. Cervical back: Normal range of motion and neck supple. Skin:     General: Skin is warm. Neurological:      Mental Status: She is alert.    Psychiatric:         Mood and Affect: Mood normal. Behavior: Behavior normal.         Thought Content: Thought content normal.       ASSESSMENT and PLAN  Diagnoses and all orders for this visit:    1. Prediabetes    Advised to watch carbohydrate. We will start,  -     dulaglutide (TRULICITY) 2.78 QB/0.3 mL sub-q pen; 0.5 mL by SubCUTAneous route every seven (7) days. The risks and benefits of treatment were discussed as well as the potential side effects. The patient verbalized understanding and agrees to the current treatment plan. The patient is instructed to call the office with any side effects    2. Essential hypertension    Slightly elevated blood pressure. She is monitoring blood pressure at home which are within normal limit. Advised her to be on DASH diet. Will refill,  -     triamterene-hydroCHLOROthiazide (DYAZIDE) 37.5-25 mg per capsule; Take 1 Capsule by mouth daily. DC HCTZ    3. Mixed hyperlipidemia  On Zoloft. Doing well. 4. Severe obesity (BMI 35.0-39. 9) with comorbidity ([address]      1. Consume 3 meals per day, do not skip meals. 2. Chose low fat, portion controlled foods for snack and desserts such as low fat chocolate pudding, low fat flavored yogurt, 100 calorie snack packs, etc.   3. Increase moderate intensity exercise to 30 minutes at least 5 times per week. 4. Read nutrition facts labels to identify foods that are lean, extra lean and low fat  The patient is asked to make an attempt to improve diet and exercise patterns to aid in medical management of this problem. We will start,  -     dulaglutide (TRULICITY) 5.28 OK/5.1 mL sub-q pen; 0.5 mL by SubCUTAneous route every seven (7) days. 5. Vitamin D deficiency  On vitamin D supplement. 6. AKIRA (generalized anxiety disorder)    She remain anxious. Will give,  -     busPIRone (BUSPAR) 5 mg tablet; Take 1 Tablet by mouth two (2) times daily as needed (anxiety).     7. Encounter for immunization  -     varicella-zoster recombinant, PF, (SHINGRIX) 50 mcg/0.5 mL susr injection; 0.5 mL by IntraMUSCular route once for 1 dose. Discussed expected course/resolution/complications of diagnosis in detail with patient. Medication risks/benefits/costs/interactions/alternatives discussed with patient. Discussed COVID-19 infection precaution with patient. Pt was given an after visit summary which includes diagnoses, current medications & vitals. Pt expressed understanding with the diagnosis and plan.

## 2023-05-02 ENCOUNTER — TELEPHONE (OUTPATIENT)
Dept: INTERNAL MEDICINE CLINIC | Age: 68
End: 2023-05-02

## 2023-05-11 ENCOUNTER — TELEPHONE (OUTPATIENT)
Age: 68
End: 2023-05-11

## 2023-05-11 NOTE — TELEPHONE ENCOUNTER
----- Message from Demetrio Tomlinson sent at 5/11/2023  9:33 AM EDT -----  Subject: Message to Provider    QUESTIONS  Information for Provider? Pt. is asking for a call back in regards of   medication question . Pt is asking or someone to reach out to her as soon   as possible . Please reach out to pt in this regards   ---------------------------------------------------------------------------  --------------  [employer] [employer]  [phone]; OK to leave message on voicemail  ---------------------------------------------------------------------------  --------------  SCRIPT ANSWERS  Relationship to Patient?  Self

## 2023-05-12 RX ORDER — SEMAGLUTIDE 0.68 MG/ML
INJECTION, SOLUTION SUBCUTANEOUS
COMMUNITY
Start: 2023-05-03

## 2023-05-12 RX ORDER — BUSPIRONE HYDROCHLORIDE 5 MG/1
TABLET ORAL
COMMUNITY
Start: 2023-05-01

## 2023-05-12 NOTE — TELEPHONE ENCOUNTER
Spoke with patient. She started Ozempic on Monday and had acid reflux bad that first night, she has been taking antacids since. Has questions on med reactions, recommended to speak with pharmacist on this, agrees.

## 2023-05-30 ENCOUNTER — HOSPITAL ENCOUNTER (OUTPATIENT)
Age: 68
Discharge: HOME | End: 2023-06-02
Payer: MEDICARE

## 2023-05-30 ENCOUNTER — TELEPHONE (OUTPATIENT)
Age: 68
End: 2023-05-30

## 2023-05-30 DIAGNOSIS — Z12.31 VISIT FOR SCREENING MAMMOGRAM: ICD-10-CM

## 2023-05-30 DIAGNOSIS — Z78.0 POST-MENOPAUSE: ICD-10-CM

## 2023-05-30 PROCEDURE — 77067 SCR MAMMO BI INCL CAD: CPT

## 2023-05-30 PROCEDURE — 77080 DXA BONE DENSITY AXIAL: CPT

## 2023-07-05 ENCOUNTER — TELEPHONE (OUTPATIENT)
Age: 68
End: 2023-07-05

## 2023-07-05 NOTE — TELEPHONE ENCOUNTER
Patient would like a call back to discuss why one of her medications is only being filled for a 30 day instead of a 90 day. Also she received a letter from her insurance company telling her that Cortney Harry is no longer in her network.  Please call her back at [phone]

## 2023-07-11 ENCOUNTER — TELEPHONE (OUTPATIENT)
Age: 68
End: 2023-07-11

## 2023-07-11 NOTE — TELEPHONE ENCOUNTER
Patient would like Taylor Ag to call her back. She wants to discuss a medication that the pharmacy only gave her 30 days of instead of 90. Please call her back at [phone]

## 2023-07-12 ENCOUNTER — OFFICE VISIT (OUTPATIENT)
Age: 68
End: 2023-07-12
Payer: MEDICARE

## 2023-07-12 VITALS
HEART RATE: 75 BPM | SYSTOLIC BLOOD PRESSURE: 157 MMHG | DIASTOLIC BLOOD PRESSURE: 88 MMHG | HEIGHT: 64 IN | BODY MASS INDEX: 35.58 KG/M2 | WEIGHT: 208.4 LBS

## 2023-07-12 DIAGNOSIS — C54.1 ENDOMETRIAL CANCER (HCC): Primary | ICD-10-CM

## 2023-07-12 DIAGNOSIS — I10 PRIMARY HYPERTENSION: Primary | ICD-10-CM

## 2023-07-12 PROCEDURE — 3079F DIAST BP 80-89 MM HG: CPT | Performed by: OBSTETRICS & GYNECOLOGY

## 2023-07-12 PROCEDURE — 99214 OFFICE O/P EST MOD 30 MIN: CPT | Performed by: OBSTETRICS & GYNECOLOGY

## 2023-07-12 PROCEDURE — 3077F SYST BP >= 140 MM HG: CPT | Performed by: OBSTETRICS & GYNECOLOGY

## 2023-07-12 PROCEDURE — 1123F ACP DISCUSS/DSCN MKR DOCD: CPT | Performed by: OBSTETRICS & GYNECOLOGY

## 2023-07-12 NOTE — PROGRESS NOTES
4 month follow up for endometrial cancer ,  pt reports no abnormal spotting or bleeding, pt states no questions or concerns for today's visit    1. Have you been to the ER, urgent care clinic since your last visit? Hospitalized since your last visit?  no    2. Have you seen or consulted any other health care providers outside of the [address] [place] [address] Avenue since your last visit? Include any pap smears or colon screening.    no
incision. Musculoskeletal: normal gait. No joint tenderness, deformity or swelling. No muscular tenderness. Extremities: extremities normal, atraumatic, no cyanosis or edema. Pelvic: exam chaperoned by nurse. Normal appearing external genitalia. On speculum exam, the vagina is atrophic. The uterus and cervix are surgically absent. No evidence of masses or nodularity on bimanual exam. Deferred rectovaginal exam.   Neuro: Grossly intact. Normal gait and movement. No acute deficit  Skin: No evidence of rashes or skin changes. IMPRESSION/PLAN:    Ms. Yennifer Garcia is a 79 y.o.   female with a working diagnosis of complex endometrial hyperplasia with atypia. On 7/12/2021 underwent Total laparoscopic hysterectomy with bilateral salpingo-oophorectomy, Bilateral sentinel lymph node mapping and biopsy, lysis of adhesions. Final pathology consistent with Stage Ia, FIGO Grade 1 endometrial cancer. 1mm myometrial invasion (<50%). Negative LVSI. Negative SLN. 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 NORY on exam today. Reassured patient. RTC in 4 months for continued surveillance. Reviewed precautionary symptoms to return sooner. All questions and concerns were addressed with the patient and she is comfortable with the plan. An electronic signature was used to authenticate this note.      Amber Degroot MD

## 2023-07-12 NOTE — TELEPHONE ENCOUNTER
Requested Prescriptions     Pending Prescriptions Disp Refills    bisoprolol (ZEBETA) 5 MG tablet 90 tablet 1     Sig: Take 1 tablet by mouth daily         Last appt 5/1/2023      Future Appointments   Date Time Provider [address] [place] [address]   11/1/2023  8:30 AM Jose Eduardo Kaiser MD [facility] BS AMB   11/8/2023  8:45 AM Nicolasa Nichole MD [facility] BS AMB

## 2023-07-12 NOTE — TELEPHONE ENCOUNTER
bisoprolol (ZEBETA) 5 MG tablet     Pt is calling again to check on the status of previous request. Patient is requesting a 90 day supply due to her insurance ending in August. ([address] [place] [address])

## 2023-07-13 RX ORDER — BISOPROLOL FUMARATE 5 MG/1
5 TABLET, FILM COATED ORAL DAILY
Qty: 90 TABLET | Refills: 1 | Status: SHIPPED | OUTPATIENT
Start: 2023-07-13

## 2023-09-04 DIAGNOSIS — I10 PRIMARY HYPERTENSION: ICD-10-CM

## 2023-09-05 RX ORDER — BISOPROLOL FUMARATE 5 MG/1
TABLET, FILM COATED ORAL
Qty: 30 TABLET | Refills: 3 | Status: SHIPPED | OUTPATIENT
Start: 2023-09-05

## 2023-09-05 NOTE — TELEPHONE ENCOUNTER
Requested Prescriptions     Pending Prescriptions Disp Refills    bisoprolol (ZEBETA) 5 MG tablet [Pharmacy Med Name: BISOPROLOL TAB 5MG] 30 tablet 3     Sig: TAKE 1 TABLET DAILY        (DISCONTINUE [facility])         [address] [place] [address]  Phone: [phone] Fax: [phone]       Last appt 5/1/2023      Future Appointments   Date Time Provider [address] [place] [address]   11/1/2023  8:30 AM Marj Alvarze MD [facility] BS AMB   11/8/2023  8:45 AM Beth Palma MD [facility] BS AMB

## 2023-11-08 ENCOUNTER — OFFICE VISIT (OUTPATIENT)
Age: 68
End: 2023-11-08
Payer: MEDICARE

## 2023-11-08 VITALS
HEIGHT: 64 IN | BODY MASS INDEX: 35.77 KG/M2 | DIASTOLIC BLOOD PRESSURE: 80 MMHG | SYSTOLIC BLOOD PRESSURE: 154 MMHG | HEART RATE: 68 BPM

## 2023-11-08 DIAGNOSIS — C54.1 ENDOMETRIAL CANCER (HCC): Primary | ICD-10-CM

## 2023-11-08 PROCEDURE — 3079F DIAST BP 80-89 MM HG: CPT | Performed by: OBSTETRICS & GYNECOLOGY

## 2023-11-08 PROCEDURE — 1123F ACP DISCUSS/DSCN MKR DOCD: CPT | Performed by: OBSTETRICS & GYNECOLOGY

## 2023-11-08 PROCEDURE — 99212 OFFICE O/P EST SF 10 MIN: CPT | Performed by: OBSTETRICS & GYNECOLOGY

## 2023-11-08 PROCEDURE — 3077F SYST BP >= 140 MM HG: CPT | Performed by: OBSTETRICS & GYNECOLOGY

## 2023-11-08 NOTE — PROGRESS NOTES
[address] [place] [address] Ashley Brenner  P [phone]  F [phone]    Office Note  Patient ID:  Name:  Bryant Nunez  MRN:  [id number]  :  1955/67 y.o. Date:  2023      HISTORY OF PRESENT ILLNESS:  Ms. Bryant Nunez is a 79 y.o.  female with a working diagnosis of complex endometrial hyperplasia with atypia. On 2021 underwent Total laparoscopic hysterectomy with bilateral salpingo-oophorectomy, Bilateral sentinel lymph node mapping and biopsy, lysis of adhesions. Final pathology consistent with Stage Ia, FIGO Grade 1 endometrial cancer. 1mm myometrial invasion (<50%). Negative LVSI. Negative SLN. Presents today for continued surveillance. Doing well without complaints. Denies vaginal bleeding/discharge, abdominal/pelvic pain, nausea, vomiting, constipation, diarrhea, CP, SOB, hematuria, hematochezia, change in appetite or bowel movements, bloating, fevers, chills, or urinary symptoms. Initial History:  Ms. Bryant Nunez is a 79 y.o. postmenopausal female who presents as a new patient from Dr. Devon Ace for complex endometrial hyperplasia with atypia. The patient reports life stressors last summer, and during all of that she reports having a light period in 2020. She presented to her PCPs office in 2020 per patient and reported that she had a normal pap smear and no further workup was done. She later presented in 2021 to her PCP and saw her doctor rather than the NP who recommended a referral to an [place]. She ultimately saw Dr. Devon Ace and underwent an endometrial biopsy on 2021 consistent with complex endometrial hyperplasia with atypia. Reports very minimal spotting now. Denies pelvic or abdominal pain/bloating. Denies CP or SOB. Denies nausea or vomiting. Denies change in appetite or bowel habits. Pertinent PMH/PSH: Smoker, HTN, HPL      Active, no restrictions.      Pathology Review:  2021:  * *

## 2023-11-08 NOTE — PROGRESS NOTES
4 month follow up for endometrial cancer ,  pt reports no abnormal spotting or bleeding, pt states no questions or concerns for today's visit    1. Have you been to the ER, urgent care clinic since your last visit? Hospitalized since your last visit?  no    2. Have you seen or consulted any other health care providers outside of the [address] [place] [address] Avenue since your last visit? Include any pap smears or colon screening.    no

## 2023-11-20 DIAGNOSIS — I10 HYPERTENSION, UNSPECIFIED TYPE: Primary | ICD-10-CM

## 2023-11-20 RX ORDER — TRIAMTERENE AND HYDROCHLOROTHIAZIDE 37.5; 25 MG/1; MG/1
1 CAPSULE ORAL DAILY
Qty: 90 CAPSULE | Refills: 1 | Status: SHIPPED | OUTPATIENT
Start: 2023-11-20

## 2023-11-30 ENCOUNTER — OFFICE VISIT (OUTPATIENT)
Age: 68
End: 2023-11-30
Payer: MEDICARE

## 2023-11-30 VITALS
SYSTOLIC BLOOD PRESSURE: 150 MMHG | WEIGHT: 212.2 LBS | TEMPERATURE: 97 F | OXYGEN SATURATION: 97 % | HEART RATE: 78 BPM | BODY MASS INDEX: 36.23 KG/M2 | RESPIRATION RATE: 16 BRPM | HEIGHT: 64 IN | DIASTOLIC BLOOD PRESSURE: 80 MMHG

## 2023-11-30 DIAGNOSIS — F17.200 SMOKING: ICD-10-CM

## 2023-11-30 DIAGNOSIS — F32.A DEPRESSION, UNSPECIFIED DEPRESSION TYPE: ICD-10-CM

## 2023-11-30 DIAGNOSIS — E66.01 MORBID (SEVERE) OBESITY DUE TO EXCESS CALORIES (HCC): ICD-10-CM

## 2023-11-30 DIAGNOSIS — F41.1 GENERALIZED ANXIETY DISORDER: ICD-10-CM

## 2023-11-30 DIAGNOSIS — I10 HYPERTENSION, UNSPECIFIED TYPE: ICD-10-CM

## 2023-11-30 DIAGNOSIS — I10 PRIMARY HYPERTENSION: Primary | ICD-10-CM

## 2023-11-30 DIAGNOSIS — E78.2 MIXED HYPERLIPIDEMIA: ICD-10-CM

## 2023-11-30 DIAGNOSIS — R73.03 PREDIABETES: ICD-10-CM

## 2023-11-30 PROCEDURE — 3077F SYST BP >= 140 MM HG: CPT | Performed by: INTERNAL MEDICINE

## 2023-11-30 PROCEDURE — 99214 OFFICE O/P EST MOD 30 MIN: CPT | Performed by: INTERNAL MEDICINE

## 2023-11-30 PROCEDURE — 1123F ACP DISCUSS/DSCN MKR DOCD: CPT | Performed by: INTERNAL MEDICINE

## 2023-11-30 PROCEDURE — 3079F DIAST BP 80-89 MM HG: CPT | Performed by: INTERNAL MEDICINE

## 2023-11-30 RX ORDER — TRIAMTERENE AND HYDROCHLOROTHIAZIDE 37.5; 25 MG/1; MG/1
1 CAPSULE ORAL DAILY
Qty: 90 CAPSULE | Refills: 1 | Status: SHIPPED | OUTPATIENT
Start: 2023-11-30

## 2023-11-30 RX ORDER — SIMVASTATIN 20 MG
20 TABLET ORAL NIGHTLY
Qty: 90 TABLET | Refills: 1 | Status: SHIPPED | OUTPATIENT
Start: 2023-11-30

## 2023-11-30 RX ORDER — BISOPROLOL FUMARATE 5 MG/1
5 TABLET, FILM COATED ORAL DAILY
Qty: 90 TABLET | Refills: 1 | Status: SHIPPED | OUTPATIENT
Start: 2023-11-30

## 2023-11-30 RX ORDER — BUSPIRONE HYDROCHLORIDE 5 MG/1
5 TABLET ORAL 2 TIMES DAILY
Qty: 180 TABLET | Refills: 1 | Status: SHIPPED | OUTPATIENT
Start: 2023-11-30

## 2023-11-30 SDOH — ECONOMIC STABILITY: INCOME INSECURITY: HOW HARD IS IT FOR YOU TO PAY FOR THE VERY BASICS LIKE FOOD, HOUSING, MEDICAL CARE, AND HEATING?: NOT HARD AT ALL

## 2023-11-30 SDOH — ECONOMIC STABILITY: HOUSING INSECURITY
IN THE LAST 12 MONTHS, WAS THERE A TIME WHEN YOU DID NOT HAVE A STEADY PLACE TO SLEEP OR SLEPT IN A SHELTER (INCLUDING NOW)?: NO

## 2023-11-30 SDOH — ECONOMIC STABILITY: FOOD INSECURITY: WITHIN THE PAST 12 MONTHS, YOU WORRIED THAT YOUR FOOD WOULD RUN OUT BEFORE YOU GOT MONEY TO BUY MORE.: NEVER TRUE

## 2023-11-30 SDOH — ECONOMIC STABILITY: FOOD INSECURITY: WITHIN THE PAST 12 MONTHS, THE FOOD YOU BOUGHT JUST DIDN'T LAST AND YOU DIDN'T HAVE MONEY TO GET MORE.: NEVER TRUE

## 2023-11-30 ASSESSMENT — PATIENT HEALTH QUESTIONNAIRE - PHQ9
4. FEELING TIRED OR HAVING LITTLE ENERGY: 0
SUM OF ALL RESPONSES TO PHQ QUESTIONS 1-9: 0
2. FEELING DOWN, DEPRESSED OR HOPELESS: 0
7. TROUBLE CONCENTRATING ON THINGS, SUCH AS READING THE NEWSPAPER OR WATCHING TELEVISION: 0
9. THOUGHTS THAT YOU WOULD BE BETTER OFF DEAD, OR OF HURTING YOURSELF: 0
2. FEELING DOWN, DEPRESSED OR HOPELESS: 0
6. FEELING BAD ABOUT YOURSELF - OR THAT YOU ARE A FAILURE OR HAVE LET YOURSELF OR YOUR FAMILY DOWN: 0
SUM OF ALL RESPONSES TO PHQ QUESTIONS 1-9: 0
SUM OF ALL RESPONSES TO PHQ QUESTIONS 1-9: 0
1. LITTLE INTEREST OR PLEASURE IN DOING THINGS: 0
5. POOR APPETITE OR OVEREATING: 0
SUM OF ALL RESPONSES TO PHQ QUESTIONS 1-9: 0
SUM OF ALL RESPONSES TO PHQ QUESTIONS 1-9: 0
SUM OF ALL RESPONSES TO PHQ9 QUESTIONS 1 & 2: 0
SUM OF ALL RESPONSES TO PHQ QUESTIONS 1-9: 0
1. LITTLE INTEREST OR PLEASURE IN DOING THINGS: 0
SUM OF ALL RESPONSES TO PHQ9 QUESTIONS 1 & 2: 0
SUM OF ALL RESPONSES TO PHQ QUESTIONS 1-9: 0
10. IF YOU CHECKED OFF ANY PROBLEMS, HOW DIFFICULT HAVE THESE PROBLEMS MADE IT FOR YOU TO DO YOUR WORK, TAKE CARE OF THINGS AT HOME, OR GET ALONG WITH OTHER PEOPLE: 0
8. MOVING OR SPEAKING SO SLOWLY THAT OTHER PEOPLE COULD HAVE NOTICED. OR THE OPPOSITE, BEING SO FIGETY OR RESTLESS THAT YOU HAVE BEEN MOVING AROUND A LOT MORE THAN USUAL: 0
3. TROUBLE FALLING OR STAYING ASLEEP: 0
SUM OF ALL RESPONSES TO PHQ QUESTIONS 1-9: 0

## 2023-11-30 ASSESSMENT — ENCOUNTER SYMPTOMS
RESPIRATORY NEGATIVE: 1
GASTROINTESTINAL NEGATIVE: 1
EYES NEGATIVE: 1

## 2023-11-30 ASSESSMENT — COLUMBIA-SUICIDE SEVERITY RATING SCALE - C-SSRS
4. HAVE YOU HAD THESE THOUGHTS AND HAD SOME INTENTION OF ACTING ON THEM?: NO
5. HAVE YOU STARTED TO WORK OUT OR WORKED OUT THE DETAILS OF HOW TO KILL YOURSELF? DO YOU INTEND TO CARRY OUT THIS PLAN?: NO
7. DID THIS OCCUR IN THE LAST THREE MONTHS: NO
3. HAVE YOU BEEN THINKING ABOUT HOW YOU MIGHT KILL YOURSELF?: NO

## 2023-11-30 NOTE — PROGRESS NOTES
Subjective:      Patient ID: Thaddeus Hummel is a 76 y.o. female here for follow-up. Has hypertension, blood pressure slightly elevated. She smoked a cigarette before coming here. Denies chest pain palpitation shortness of breath. Needs refill on medication. Has hyperlipidemia, on statin. No myalgia. Staff of anxiety and depression. Depression seems under control. Taking BuSpar for anxiety. Needs refill. She smokes 10 cigarettes a day, advised to quit. Mammogram and bone density up-to-date. Colonoscopy up-to-date, follow-up colonoscopy in 2 years  Refused flu shot, needs shingles vaccine. Hypertension    Weight Management        Review of Systems   Constitutional: Negative. HENT: Negative. Eyes: Negative. Respiratory: Negative. Cardiovascular: Negative. Gastrointestinal: Negative. Genitourinary: Negative. Musculoskeletal: Negative. Skin: Negative. Neurological: Negative. Hematological: Negative. Objective:   Physical Exam  Constitutional:       Appearance: Normal appearance. She is obese. HENT:      Left Ear: There is impacted cerumen. Eyes:      Extraocular Movements: Extraocular movements intact. Pupils: Pupils are equal, round, and reactive to light. Cardiovascular:      Rate and Rhythm: Normal rate and regular rhythm. Pulses: Normal pulses. Heart sounds: Normal heart sounds. Pulmonary:      Effort: Pulmonary effort is normal.      Breath sounds: Normal breath sounds. Abdominal:      General: Abdomen is flat. Bowel sounds are normal.      Palpations: Abdomen is soft. Musculoskeletal:         General: Normal range of motion. Cervical back: Normal range of motion and neck supple. Skin:     General: Skin is warm. Neurological:      General: No focal deficit present. Mental Status: She is alert and oriented to person, place, and time. Mental status is at baseline.          Assessment / Plan:      Diagnoses and all orders

## 2023-12-01 LAB
ALBUMIN SERPL-MCNC: 4.2 G/DL (ref 3.9–4.9)
ALBUMIN/GLOB SERPL: 1.7 {RATIO} (ref 1.2–2.2)
ALP SERPL-CCNC: 117 IU/L (ref 44–121)
ALT SERPL-CCNC: 15 IU/L (ref 0–32)
AST SERPL-CCNC: 17 IU/L (ref 0–40)
BILIRUB SERPL-MCNC: 0.3 MG/DL (ref 0–1.2)
BUN SERPL-MCNC: 15 MG/DL (ref 8–27)
BUN/CREAT SERPL: 19 (ref 12–28)
CALCIUM SERPL-MCNC: 9.4 MG/DL (ref 8.7–10.3)
CHLORIDE SERPL-SCNC: 107 MMOL/L (ref 96–106)
CHOLEST SERPL-MCNC: 179 MG/DL (ref 100–199)
CO2 SERPL-SCNC: 24 MMOL/L (ref 20–29)
CREAT SERPL-MCNC: 0.78 MG/DL (ref 0.57–1)
EGFRCR SERPLBLD CKD-EPI 2021: 83 ML/MIN/1.73
GLOBULIN SER CALC-MCNC: 2.5 G/DL (ref 1.5–4.5)
GLUCOSE SERPL-MCNC: 111 MG/DL (ref 70–99)
HBA1C MFR BLD: 5.9 % (ref 4.8–5.6)
HDLC SERPL-MCNC: 57 MG/DL
IMP & REVIEW OF LAB RESULTS: NORMAL
LDLC SERPL CALC-MCNC: 110 MG/DL (ref 0–99)
POTASSIUM SERPL-SCNC: 4.3 MMOL/L (ref 3.5–5.2)
PROT SERPL-MCNC: 6.7 G/DL (ref 6–8.5)
SODIUM SERPL-SCNC: 143 MMOL/L (ref 134–144)
TRIGL SERPL-MCNC: 63 MG/DL (ref 0–149)
VLDLC SERPL CALC-MCNC: 12 MG/DL (ref 5–40)

## 2024-01-15 ENCOUNTER — TELEPHONE (OUTPATIENT)
Age: 69
End: 2024-01-15

## 2024-01-15 ENCOUNTER — TELEMEDICINE (OUTPATIENT)
Age: 69
End: 2024-01-15
Payer: MEDICARE

## 2024-01-15 DIAGNOSIS — J06.9 URTI (ACUTE UPPER RESPIRATORY INFECTION): Primary | ICD-10-CM

## 2024-01-15 DIAGNOSIS — R73.03 PREDIABETES: ICD-10-CM

## 2024-01-15 DIAGNOSIS — I10 PRIMARY HYPERTENSION: ICD-10-CM

## 2024-01-15 PROCEDURE — 99213 OFFICE O/P EST LOW 20 MIN: CPT | Performed by: INTERNAL MEDICINE

## 2024-01-15 PROCEDURE — 1123F ACP DISCUSS/DSCN MKR DOCD: CPT | Performed by: INTERNAL MEDICINE

## 2024-01-15 RX ORDER — DOXYCYCLINE HYCLATE 100 MG
100 TABLET ORAL 2 TIMES DAILY
Qty: 14 TABLET | Refills: 0 | Status: SHIPPED | OUTPATIENT
Start: 2024-01-15 | End: 2024-01-22

## 2024-01-15 NOTE — PROGRESS NOTES
Kalina Ortiz, was evaluated through a synchronous (real-time) audio-video encounter. The patient (or guardian if applicable) is aware that this is a billable service, which includes applicable co-pays. This Virtual Visit was conducted with patient's (and/or legal guardian's) consent. Patient identification was verified, and a caregiver was present when appropriate.   The [address] [facility] [facility] [address] [address]  Provider was located at Facility (Appt Dept): [address]  [place] [address]      Kalina Ortiz (:  1955) is a Established patient, presenting virtually for evaluation of the following:    Assessment & Plan   Below is the assessment and plan developed based on review of pertinent history, physical exam, labs, studies, and medications.  1. URTI (acute upper respiratory infection)    Rest and fluids.  Will call in,  -     doxycycline hyclate (VIBRA-TABS) 100 MG tablet; Take 1 tablet by mouth 2 times daily for 7 days, Disp-14 tablet, R-0Normal  2. Primary hypertension    Stable blood pressure.  On Dyazide.  3. Prediabetes  Advised to be on low-carb diet and exercise.  No follow-ups on file.       Subjective     Ms. ortiz is here with a complaining of nasal congestion cough and postnasal drip for over 1 week.  She has been trying over-the-counter medications and Mucinex.  Cough is progressively getting worse.  Having cough with yellow sputum.  No fever.  She is not exposed to COVID.  Has prediabetes, watching carbohydrate  .  Has hypertension, compliant with medication.-Atrial fibrillation shortness of breath.  Labs reviewed.  HPI  Review of Systems       Objective   Patient-Reported Vitals  No data recorded     Physical Exam      Constitutional: [x] Appears well-developed and well-nourished [x] No apparent distress      [] Abnormal -     Mental status: [x] Alert and awake  [x] Oriented to person/place/time [x] Able to follow commands    [] Abnormal

## 2024-03-06 DIAGNOSIS — I10 PRIMARY HYPERTENSION: ICD-10-CM

## 2024-03-06 RX ORDER — TRIAMTERENE AND HYDROCHLOROTHIAZIDE 37.5; 25 MG/1; MG/1
1 CAPSULE ORAL DAILY
Qty: 90 CAPSULE | Refills: 1 | Status: SHIPPED | OUTPATIENT
Start: 2024-03-06

## 2024-03-06 NOTE — TELEPHONE ENCOUNTER
LOV: 01/15/2024  NOV: 05/15/2024    Medication:   triamterene-hydroCHLOROthiazide (DYAZIDE) 37.5-25 MG per capsule   Quantity: 90 Day Supply    Pharmacy: [address] LOOP - P [phone] - F [phone] [[id number]]

## 2024-03-08 DIAGNOSIS — I10 PRIMARY HYPERTENSION: ICD-10-CM

## 2024-03-08 NOTE — TELEPHONE ENCOUNTER
triamterene-hydroCHLOROthiazide (DYAZIDE) 37.5-25 MG per capsule     Please resend script to:     [facility] - [facility] [address] [phone] - f [phone]   Not the specialty pharmacy

## 2024-03-11 RX ORDER — TRIAMTERENE AND HYDROCHLOROTHIAZIDE 37.5; 25 MG/1; MG/1
1 CAPSULE ORAL DAILY
Qty: 90 CAPSULE | Refills: 1 | OUTPATIENT
Start: 2024-03-11

## 2024-03-13 ENCOUNTER — OFFICE VISIT (OUTPATIENT)
Age: 69
End: 2024-03-13
Payer: MEDICARE

## 2024-03-13 VITALS
HEART RATE: 68 BPM | DIASTOLIC BLOOD PRESSURE: 84 MMHG | HEIGHT: 64 IN | SYSTOLIC BLOOD PRESSURE: 136 MMHG | BODY MASS INDEX: 36.42 KG/M2

## 2024-03-13 DIAGNOSIS — C54.1 ENDOMETRIAL CANCER (HCC): Primary | ICD-10-CM

## 2024-03-13 DIAGNOSIS — E66.01 SEVERE OBESITY (BMI 35.0-39.9) WITH COMORBIDITY (HCC): ICD-10-CM

## 2024-03-13 PROCEDURE — 99212 OFFICE O/P EST SF 10 MIN: CPT | Performed by: OBSTETRICS & GYNECOLOGY

## 2024-03-13 NOTE — PROGRESS NOTES
4 month follow up for endometrial cancer ,  pt reports no abnormal spotting or bleeding, pt states no questions or concerns for today's visit    1. Have you been to the ER, urgent care clinic since your last visit?  Hospitalized since your last visit?  no    2. Have you seen or consulted any other health care providers outside of the [facility] since your last visit?  Include any pap smears or colon screening.   no

## 2024-03-13 NOTE — PROGRESS NOTES
[facility] GYNECOLOGIC ONCOLOGY  [address] [place] [address], [address]  [place] [address]  P [phone]  F [phone]    Office Note  Patient ID:  Name:  Kalina Ortiz  MRN:  [id number]  :  1955/67 y.o.  Date:  2023      HISTORY OF PRESENT ILLNESS:  Ms. Kalina Ortiz is a 68 y.o.  female with a working diagnosis of complex endometrial hyperplasia with atypia. On 2021 underwent Total laparoscopic hysterectomy with bilateral salpingo-oophorectomy, Bilateral sentinel lymph node mapping and biopsy, lysis of adhesions. Final pathology consistent with Stage Ia, FIGO Grade 1 endometrial cancer. 1mm myometrial invasion (<50%). Negative LVSI. Negative SLN.     Presents today for continued surveillance. Doing well without complaints. Denies vaginal bleeding/discharge, abdominal/pelvic pain, nausea, vomiting, constipation, diarrhea, CP, SOB, hematuria, hematochezia, change in appetite or bowel movements, bloating, fevers, chills, or urinary symptoms.       Initial History:  Ms. Kalina Ortiz is a 67 y.o. postmenopausal female who presents as a new patient from Dr. Jenkins for complex endometrial hyperplasia with atypia.    The patient reports life stressors last summer, and during all of that she reports having a light period in 2020. She presented to her PCPs office in 2020 per patient and reported that she had a normal pap smear and no further workup was done. She later presented in 2021 to her PCP and saw her doctor rather than the NP who recommended a referral to an ObGyn. She ultimately saw Dr. Jenkins and underwent an endometrial biopsy on 2021 consistent with complex endometrial hyperplasia with atypia. Reports very minimal spotting now. Denies pelvic or abdominal pain/bloating. Denies CP or SOB. Denies nausea or vomiting. Denies change in appetite or bowel habits.      Pertinent PMH/PSH: Smoker, HTN, HPL      Active, no restrictions.     Pathology Review:  2021:  * *

## 2024-03-15 ENCOUNTER — TELEPHONE (OUTPATIENT)
Age: 69
End: 2024-03-15

## 2024-03-15 DIAGNOSIS — I10 PRIMARY HYPERTENSION: ICD-10-CM

## 2024-03-15 RX ORDER — TRIAMTERENE AND HYDROCHLOROTHIAZIDE 37.5; 25 MG/1; MG/1
1 CAPSULE ORAL DAILY
Qty: 90 CAPSULE | Refills: 1 | Status: SHIPPED | OUTPATIENT
Start: 2024-03-15

## 2024-03-15 NOTE — TELEPHONE ENCOUNTER
triamterene-hydroCHLOROthiazide (DYAZIDE) 37.5-25 MG per capsule     Can we follow up on if the 90 day request was sent to [facility] pharmacy. *not the specialty*     Please return call at [phone]

## 2024-03-21 RX ORDER — FLUTICASONE PROPIONATE 50 MCG
1 SPRAY, SUSPENSION (ML) NASAL DAILY
Qty: 16 G | Refills: 1 | Status: SHIPPED | OUTPATIENT
Start: 2024-03-21

## 2024-03-21 NOTE — TELEPHONE ENCOUNTER
[facility], [facility]. - [place], [place] - [address] [place] [address] [phone] - F [phone]      fluticasone (FLONASE) 50 MCG/ACT nasal spray   01/15/2024  05/15/2024

## 2024-04-24 DIAGNOSIS — E78.2 MIXED HYPERLIPIDEMIA: ICD-10-CM

## 2024-04-24 RX ORDER — SIMVASTATIN 20 MG
20 TABLET ORAL NIGHTLY
Qty: 90 TABLET | Refills: 1 | Status: SHIPPED | OUTPATIENT
Start: 2024-04-24

## 2024-05-15 ENCOUNTER — OFFICE VISIT (OUTPATIENT)
Age: 69
End: 2024-05-15
Payer: MEDICARE

## 2024-05-15 VITALS
OXYGEN SATURATION: 98 % | HEART RATE: 67 BPM | HEIGHT: 64 IN | RESPIRATION RATE: 16 BRPM | DIASTOLIC BLOOD PRESSURE: 78 MMHG | SYSTOLIC BLOOD PRESSURE: 148 MMHG | WEIGHT: 211.6 LBS | TEMPERATURE: 98.4 F | BODY MASS INDEX: 36.12 KG/M2

## 2024-05-15 DIAGNOSIS — R73.03 PREDIABETES: ICD-10-CM

## 2024-05-15 DIAGNOSIS — E55.9 VITAMIN D DEFICIENCY: ICD-10-CM

## 2024-05-15 DIAGNOSIS — N85.02 EIN (ENDOMETRIAL INTRAEPITHELIAL NEOPLASIA): ICD-10-CM

## 2024-05-15 DIAGNOSIS — F32.A DEPRESSION, UNSPECIFIED DEPRESSION TYPE: ICD-10-CM

## 2024-05-15 DIAGNOSIS — J30.1 SEASONAL ALLERGIC RHINITIS DUE TO POLLEN: ICD-10-CM

## 2024-05-15 DIAGNOSIS — I10 PRIMARY HYPERTENSION: Primary | ICD-10-CM

## 2024-05-15 DIAGNOSIS — Z12.31 ENCOUNTER FOR SCREENING MAMMOGRAM FOR MALIGNANT NEOPLASM OF BREAST: ICD-10-CM

## 2024-05-15 DIAGNOSIS — Z00.00 MEDICARE ANNUAL WELLNESS VISIT, SUBSEQUENT: ICD-10-CM

## 2024-05-15 DIAGNOSIS — E78.2 MIXED HYPERLIPIDEMIA: ICD-10-CM

## 2024-05-15 DIAGNOSIS — Z71.89 ACP (ADVANCE CARE PLANNING): ICD-10-CM

## 2024-05-15 LAB
BASOPHILS # BLD AUTO: 0 X10E3/UL (ref 0–0.2)
BASOPHILS NFR BLD AUTO: 0 %
EOSINOPHIL # BLD AUTO: 0.1 X10E3/UL (ref 0–0.4)
EOSINOPHIL NFR BLD AUTO: 3 %
ERYTHROCYTE [DISTWIDTH] IN BLOOD BY AUTOMATED COUNT: 12.1 % (ref 11.7–15.4)
HCT VFR BLD AUTO: 38.7 % (ref 34–46.6)
HGB BLD-MCNC: 12.8 G/DL (ref 11.1–15.9)
IMM GRANULOCYTES # BLD AUTO: 0 X10E3/UL (ref 0–0.1)
IMM GRANULOCYTES NFR BLD AUTO: 0 %
LYMPHOCYTES # BLD AUTO: 1.3 X10E3/UL (ref 0.7–3.1)
LYMPHOCYTES NFR BLD AUTO: 28 %
MCH RBC QN AUTO: 30.8 PG (ref 26.6–33)
MCHC RBC AUTO-ENTMCNC: 33.1 G/DL (ref 31.5–35.7)
MCV RBC AUTO: 93 FL (ref 79–97)
MONOCYTES # BLD AUTO: 0.4 X10E3/UL (ref 0.1–0.9)
MONOCYTES NFR BLD AUTO: 8 %
NEUTROPHILS # BLD AUTO: 2.8 X10E3/UL (ref 1.4–7)
NEUTROPHILS NFR BLD AUTO: 61 %
PLATELET # BLD AUTO: 326 X10E3/UL (ref 150–450)
RBC # BLD AUTO: 4.15 X10E6/UL (ref 3.77–5.28)
WBC # BLD AUTO: 4.6 X10E3/UL (ref 3.4–10.8)

## 2024-05-15 PROCEDURE — 99214 OFFICE O/P EST MOD 30 MIN: CPT | Performed by: INTERNAL MEDICINE

## 2024-05-15 PROCEDURE — 3077F SYST BP >= 140 MM HG: CPT | Performed by: INTERNAL MEDICINE

## 2024-05-15 PROCEDURE — 99497 ADVNCD CARE PLAN 30 MIN: CPT | Performed by: INTERNAL MEDICINE

## 2024-05-15 PROCEDURE — G0439 PPPS, SUBSEQ VISIT: HCPCS | Performed by: INTERNAL MEDICINE

## 2024-05-15 PROCEDURE — 3078F DIAST BP <80 MM HG: CPT | Performed by: INTERNAL MEDICINE

## 2024-05-15 PROCEDURE — 1123F ACP DISCUSS/DSCN MKR DOCD: CPT | Performed by: INTERNAL MEDICINE

## 2024-05-15 RX ORDER — SIMVASTATIN 20 MG
20 TABLET ORAL NIGHTLY
Qty: 90 TABLET | Refills: 1 | Status: SHIPPED | OUTPATIENT
Start: 2024-05-15

## 2024-05-15 RX ORDER — TRIAMTERENE AND HYDROCHLOROTHIAZIDE 37.5; 25 MG/1; MG/1
1 CAPSULE ORAL DAILY
Qty: 90 CAPSULE | Refills: 1 | Status: SHIPPED | OUTPATIENT
Start: 2024-05-15

## 2024-05-15 RX ORDER — BISOPROLOL FUMARATE 5 MG/1
5 TABLET, FILM COATED ORAL DAILY
Qty: 90 TABLET | Refills: 1 | Status: SHIPPED | OUTPATIENT
Start: 2024-05-15

## 2024-05-15 RX ORDER — CETIRIZINE HYDROCHLORIDE 10 MG/1
10 TABLET ORAL DAILY
Qty: 30 TABLET | Refills: 2 | Status: SHIPPED | OUTPATIENT
Start: 2024-05-15

## 2024-05-15 ASSESSMENT — LIFESTYLE VARIABLES
HOW MANY STANDARD DRINKS CONTAINING ALCOHOL DO YOU HAVE ON A TYPICAL DAY: 1 OR 2
HOW OFTEN DO YOU HAVE A DRINK CONTAINING ALCOHOL: 2-4 TIMES A MONTH

## 2024-05-15 ASSESSMENT — PATIENT HEALTH QUESTIONNAIRE - PHQ9
SUM OF ALL RESPONSES TO PHQ QUESTIONS 1-9: 4
SUM OF ALL RESPONSES TO PHQ9 QUESTIONS 1 & 2: 4
SUM OF ALL RESPONSES TO PHQ QUESTIONS 1-9: 4
1. LITTLE INTEREST OR PLEASURE IN DOING THINGS: MORE THAN HALF THE DAYS
9. THOUGHTS THAT YOU WOULD BE BETTER OFF DEAD, OR OF HURTING YOURSELF: NOT AT ALL
6. FEELING BAD ABOUT YOURSELF - OR THAT YOU ARE A FAILURE OR HAVE LET YOURSELF OR YOUR FAMILY DOWN: NOT AT ALL
2. FEELING DOWN, DEPRESSED OR HOPELESS: MORE THAN HALF THE DAYS
5. POOR APPETITE OR OVEREATING: NOT AT ALL
4. FEELING TIRED OR HAVING LITTLE ENERGY: NOT AT ALL
SUM OF ALL RESPONSES TO PHQ QUESTIONS 1-9: 4
3. TROUBLE FALLING OR STAYING ASLEEP: NOT AT ALL
7. TROUBLE CONCENTRATING ON THINGS, SUCH AS READING THE NEWSPAPER OR WATCHING TELEVISION: NOT AT ALL
8. MOVING OR SPEAKING SO SLOWLY THAT OTHER PEOPLE COULD HAVE NOTICED. OR THE OPPOSITE, BEING SO FIGETY OR RESTLESS THAT YOU HAVE BEEN MOVING AROUND A LOT MORE THAN USUAL: NOT AT ALL
10. IF YOU CHECKED OFF ANY PROBLEMS, HOW DIFFICULT HAVE THESE PROBLEMS MADE IT FOR YOU TO DO YOUR WORK, TAKE CARE OF THINGS AT HOME, OR GET ALONG WITH OTHER PEOPLE: VERY DIFFICULT
SUM OF ALL RESPONSES TO PHQ QUESTIONS 1-9: 4

## 2024-05-15 ASSESSMENT — ENCOUNTER SYMPTOMS
EYES NEGATIVE: 1
RESPIRATORY NEGATIVE: 1
GASTROINTESTINAL NEGATIVE: 1

## 2024-05-15 NOTE — ACP (ADVANCE CARE PLANNING)

## 2024-05-15 NOTE — PROGRESS NOTES
Chief Complaint   Patient presents with    Hypertension    Medicare AWV    Cholesterol Problem           History of Present Illness  The patient presents for Medicare wellness visit.    The patient, who works full-time, reports no memory issues at home. She attempted to perform a clock-drawing test but found embarrassment. She retains the ability to remember the words. Her medical power of  is her daughter, Chris Ortiz. She monitors her blood pressure, which is typically within normal limits. However, she experiences elevated blood pressure during medical visits, which she attributes to stress. She denies experiencing significant stress. She reports ear drainage and has previously used Debrox, which she has since discontinued. She experiences leg tightness, which she attributes to extensive nocturnal walking. She elevates her legs at night, which provides relief. Her caffeine intake is limited to weekends, and she consumes tea daily. She is up-to-date with her colonoscopy, with the last one conducted in 2016, which did not reveal any polyps.    Past Medical History:   Diagnosis Date    Complex endometrial hyperplasia     COVID-19 vaccine series completed 3/22/21,  4/23/21    MODERNA    Herpes     High cholesterol     Hypercholesterolemia     Hypertension     Post-menopausal bleeding      Review of Systems   Constitutional: Negative.    HENT: Negative.     Eyes: Negative.    Respiratory: Negative.     Cardiovascular: Negative.    Gastrointestinal: Negative.    Endocrine: Negative.    Genitourinary: Negative.    Musculoskeletal: Negative.    Skin: Negative.    Neurological: Negative.    Hematological: Negative.    Psychiatric/Behavioral: Negative.         Vitals:    05/15/24 0851   BP: (!) 148/78   Pulse:    Resp:    Temp:    SpO2:      Physical Exam  Both ears have wax.    Vital Signs  The patient's blood pressure is 148/80. The patient's weight is 211.      Physical Exam  Vitals and nursing note reviewed. 
declines any further evaluation or treatment            Activity, Diet, and Weight:  On average, how many days per week do you engage in moderate to strenuous exercise (like a brisk walk)?: 0 days  On average, how many minutes do you engage in exercise at this level?: 0 min    Do you eat balanced/healthy meals regularly?: Yes    Body mass index is 36.32 kg/m². (!) Abnormal        Inactivity Interventions:  Patient declined any further interventions or treatment  Obesity Interventions:  low carbohydrate diet, exercise for at least 150 minutes/week                 Advanced Directives:  Do you have a Living Will?: (!) No    Intervention:          Tobacco Use:  Tobacco Use: High Risk (5/15/2024)    Patient History     Smoking Tobacco Use: Every Day     Smokeless Tobacco Use: Never     Passive Exposure: Current     E-cigarette/Vaping       Questions Responses    E-cigarette/Vaping Use     Start Date     Passive Exposure     Quit Date     Counseling Given     Comments           Interventions:  Patient declined any further intervention or treatment                      Objective   Vitals:    05/15/24 0832 05/15/24 0851   BP: (!) 160/88 (!) 148/78   Site: Left Upper Arm    Position: Sitting    Cuff Size: Medium Adult    Pulse: 67    Resp: 16    Temp: 98.4 °F (36.9 °C)    TempSrc: Oral    SpO2: 98%    Weight: 96 kg (211 lb 9.6 oz)    Height: 1.626 m (5' 4\")       Body mass index is 36.32 kg/m².             No Known Allergies  Prior to Visit Medications    Medication Sig Taking? Authorizing Provider   triamterene-hydroCHLOROthiazide (DYAZIDE) 37.5-25 MG per capsule Take 1 capsule by mouth daily Yes Carlene Melvin MD   simvastatin (ZOCOR) 20 MG tablet Take 1 tablet by mouth nightly Yes Carlene Melvin MD   bisoprolol (ZEBETA) 5 MG tablet Take 1 tablet by mouth daily Yes Carlene Melvin MD   cetirizine (ZYRTEC) 10 MG tablet Take 1 tablet by mouth daily Yes Carlene Melvin MD   fluticasone (FLONASE) 50 MCG/ACT nasal spray 1 spray by Nasal

## 2024-05-16 ENCOUNTER — TELEPHONE (OUTPATIENT)
Age: 69
End: 2024-05-16

## 2024-05-16 LAB
25(OH)D3+25(OH)D2 SERPL-MCNC: 27.4 NG/ML (ref 30–100)
ALBUMIN SERPL-MCNC: 4.6 G/DL (ref 3.9–4.9)
ALBUMIN/GLOB SERPL: 1.8 {RATIO} (ref 1.2–2.2)
ALP SERPL-CCNC: 131 IU/L (ref 44–121)
ALT SERPL-CCNC: 14 IU/L (ref 0–32)
AST SERPL-CCNC: 20 IU/L (ref 0–40)
BILIRUB SERPL-MCNC: 0.4 MG/DL (ref 0–1.2)
BUN SERPL-MCNC: 9 MG/DL (ref 8–27)
BUN/CREAT SERPL: 13 (ref 12–28)
CALCIUM SERPL-MCNC: 9.8 MG/DL (ref 8.7–10.3)
CHLORIDE SERPL-SCNC: 101 MMOL/L (ref 96–106)
CHOLEST SERPL-MCNC: 179 MG/DL (ref 100–199)
CO2 SERPL-SCNC: 25 MMOL/L (ref 20–29)
CREAT SERPL-MCNC: 0.69 MG/DL (ref 0.57–1)
EGFRCR SERPLBLD CKD-EPI 2021: 94 ML/MIN/1.73
GLOBULIN SER CALC-MCNC: 2.6 G/DL (ref 1.5–4.5)
GLUCOSE SERPL-MCNC: 131 MG/DL (ref 70–99)
HBA1C MFR BLD: 6.1 % (ref 4.8–5.6)
HDLC SERPL-MCNC: 60 MG/DL
IMP & REVIEW OF LAB RESULTS: NORMAL
LDLC SERPL CALC-MCNC: 101 MG/DL (ref 0–99)
POTASSIUM SERPL-SCNC: 4.5 MMOL/L (ref 3.5–5.2)
PROT SERPL-MCNC: 7.2 G/DL (ref 6–8.5)
SODIUM SERPL-SCNC: 142 MMOL/L (ref 134–144)
TRIGL SERPL-MCNC: 102 MG/DL (ref 0–149)
VLDLC SERPL CALC-MCNC: 18 MG/DL (ref 5–40)

## 2024-05-16 NOTE — TELEPHONE ENCOUNTER
Prediabetic, need to watch carbohydrate.  Lipid has improved.  Low vitamin D, advised to take vitamin D3 1000 units once a day for 4 months.  All other labs are stable.

## 2024-05-17 NOTE — TELEPHONE ENCOUNTER
Spoke with patient, allergy pill sent in but she did not want that sent in to pharmacy (mistake by Dr. Carlene Melvin MD).    Patient also concerned that she could not draw clock at [facility]. Offered follow up apt.     Future Appointments   Date Time Provider Department Center   5/31/2024  8:30 AM Carlene Melvin MD [facility] BS AMB   7/17/2024  8:15 AM Geronimo Hernandez MD [facility] BS AMB

## 2024-06-06 ENCOUNTER — TELEPHONE (OUTPATIENT)
Age: 69
End: 2024-06-06

## 2024-06-06 NOTE — TELEPHONE ENCOUNTER
Patient Kalina was asked about the following potential symptoms:    Chest Pain: No (0-10 in severity: NA)  Location (if Positive for Chest Pain): NA  SOB: NO  Headache: YES  One-Sided Weakness: NO  Left Arm Pain: NO  Jaw Pain: NO  Vision Changes: YES  Speech Difficulty: NO  Facial Drooping: NO  Nausea/Vomiting: NO  Back Pain: No     BP was: 157/86, went to ER/UC \"around the corner\" today 134/87.

## 2024-06-06 NOTE — TELEPHONE ENCOUNTER
Pt was attempting to schedule a Walk-In visit with Dr. Melvin; Informed Pt we do not do walk-ins the office is appt based; Pt stated their last visit was a walk in & would like to schedule with Dr. Melvin today and for someone to give her a call; Please Advise    Pt Symptoms:  Not Feeling Well  Blurred Vision  High Blood Pressure

## 2024-06-26 DIAGNOSIS — J30.1 SEASONAL ALLERGIC RHINITIS DUE TO POLLEN: Primary | ICD-10-CM

## 2024-06-26 NOTE — TELEPHONE ENCOUNTER
[facility], [facility]. - [place], [place] - [address] [phone] - F [phone]     fluticasone (FLONASE) 50 MCG/ACT nasal spray     05/15/2024  No upcoming

## 2024-06-27 RX ORDER — FLUTICASONE PROPIONATE 50 MCG
1 SPRAY, SUSPENSION (ML) NASAL DAILY
Qty: 16 G | Refills: 1 | Status: SHIPPED | OUTPATIENT
Start: 2024-06-27

## 2024-07-17 ENCOUNTER — OFFICE VISIT (OUTPATIENT)
Age: 69
End: 2024-07-17
Payer: MEDICARE

## 2024-07-17 VITALS
HEART RATE: 67 BPM | SYSTOLIC BLOOD PRESSURE: 156 MMHG | DIASTOLIC BLOOD PRESSURE: 97 MMHG | HEIGHT: 64 IN | BODY MASS INDEX: 36.32 KG/M2

## 2024-07-17 DIAGNOSIS — C54.1 ENDOMETRIAL CANCER (HCC): Primary | ICD-10-CM

## 2024-07-17 PROCEDURE — 1123F ACP DISCUSS/DSCN MKR DOCD: CPT | Performed by: OBSTETRICS & GYNECOLOGY

## 2024-07-17 PROCEDURE — 3077F SYST BP >= 140 MM HG: CPT | Performed by: OBSTETRICS & GYNECOLOGY

## 2024-07-17 PROCEDURE — 3080F DIAST BP >= 90 MM HG: CPT | Performed by: OBSTETRICS & GYNECOLOGY

## 2024-07-17 PROCEDURE — 99212 OFFICE O/P EST SF 10 MIN: CPT | Performed by: OBSTETRICS & GYNECOLOGY

## 2024-07-17 NOTE — PROGRESS NOTES
4 month follow up for endometrial cancer ,  pt reports no abnormal spotting or bleeding, pt states no questions or concerns for today's visit    1. Have you been to the ER, urgent care clinic since your last visit?  Hospitalized since your last visit?  no    2. Have you seen or consulted any other health care providers outside of the [facility] since your last visit?  Include any pap smears or colon screening.   no          
facility-administered medications on file prior to visit.       No Known Allergies     OBJECTIVE:  Physical Exam:  Vitals:    07/17/24 0811   BP: (!) 156/97   Site: Left Upper Arm   Position: Sitting   Pulse: 67   Height: 1.626 m (5' 4\")           General: Alert and oriented. No acute distress. Well-nourished  HEENT: No thyroid enlargment. Neck supple without restrictions. Sclera normal. Normal occular motion. Moist mucous membranes.  Lymphatics: No evidence of axillary, cervical, or subclavicular adenopathy.   Respiratory: clear to auscultation and percussion to the bases. No CVAT.  Cardiovascular: regular rate and rhythm. No murmurs, rubs, or gallops.  Gastrointestinal: soft, non-tender, non-distended, no masses or organomegaly. Well-healed incision.   Musculoskeletal: normal gait. No joint tenderness, deformity or swelling. No muscular tenderness.   Extremities: extremities normal, atraumatic, no cyanosis or edema.  Pelvic: exam chaperoned by nurse. Normal appearing external genitalia. On speculum exam, the vagina is atrophic. The uterus and cervix are surgically absent. No evidence of masses or nodularity on bimanual exam. Deferred rectovaginal exam.   Neuro: Grossly intact. Normal gait and movement. No acute deficit  Skin: No evidence of rashes or skin changes.         IMPRESSION/PLAN:    Ms. Kalina Ortiz is a 68 y.o.   female with a working diagnosis of complex endometrial hyperplasia with atypia. On 7/12/2021 underwent Total laparoscopic hysterectomy with bilateral salpingo-oophorectomy, Bilateral sentinel lymph node mapping and biopsy, lysis of adhesions. Final pathology consistent with Stage Ia, FIGO Grade 1 endometrial cancer. 1mm myometrial invasion (<50%). Negative LVSI. Negative SLN. 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

## 2024-07-26 ENCOUNTER — HOSPITAL ENCOUNTER (OUTPATIENT)
Age: 69
End: 2024-07-26
Payer: MEDICARE

## 2024-07-26 VITALS — HEIGHT: 64 IN | WEIGHT: 211 LBS | BODY MASS INDEX: 36.02 KG/M2

## 2024-07-26 DIAGNOSIS — Z12.31 ENCOUNTER FOR SCREENING MAMMOGRAM FOR MALIGNANT NEOPLASM OF BREAST: ICD-10-CM

## 2024-07-26 PROCEDURE — 77067 SCR MAMMO BI INCL CAD: CPT

## 2024-09-16 ENCOUNTER — OFFICE VISIT (OUTPATIENT)
Age: 69
End: 2024-09-16
Payer: MEDICARE

## 2024-09-16 VITALS
WEIGHT: 206.8 LBS | OXYGEN SATURATION: 98 % | BODY MASS INDEX: 35.3 KG/M2 | DIASTOLIC BLOOD PRESSURE: 80 MMHG | RESPIRATION RATE: 16 BRPM | HEART RATE: 72 BPM | SYSTOLIC BLOOD PRESSURE: 150 MMHG | TEMPERATURE: 98 F | HEIGHT: 64 IN

## 2024-09-16 DIAGNOSIS — F32.A DEPRESSION, UNSPECIFIED DEPRESSION TYPE: ICD-10-CM

## 2024-09-16 DIAGNOSIS — E78.2 MIXED HYPERLIPIDEMIA: ICD-10-CM

## 2024-09-16 DIAGNOSIS — H61.22 IMPACTED CERUMEN OF LEFT EAR: ICD-10-CM

## 2024-09-16 DIAGNOSIS — R73.03 PREDIABETES: ICD-10-CM

## 2024-09-16 DIAGNOSIS — I10 PRIMARY HYPERTENSION: ICD-10-CM

## 2024-09-16 DIAGNOSIS — I10 PRIMARY HYPERTENSION: Primary | ICD-10-CM

## 2024-09-16 PROCEDURE — 1123F ACP DISCUSS/DSCN MKR DOCD: CPT | Performed by: INTERNAL MEDICINE

## 2024-09-16 PROCEDURE — 3077F SYST BP >= 140 MM HG: CPT | Performed by: INTERNAL MEDICINE

## 2024-09-16 PROCEDURE — 99214 OFFICE O/P EST MOD 30 MIN: CPT | Performed by: INTERNAL MEDICINE

## 2024-09-16 PROCEDURE — 3079F DIAST BP 80-89 MM HG: CPT | Performed by: INTERNAL MEDICINE

## 2024-09-16 RX ORDER — LOSARTAN POTASSIUM 50 MG/1
50 TABLET ORAL DAILY
Qty: 30 TABLET | Refills: 2 | Status: SHIPPED | OUTPATIENT
Start: 2024-09-16

## 2024-09-16 ASSESSMENT — ENCOUNTER SYMPTOMS
GASTROINTESTINAL NEGATIVE: 1
EYES NEGATIVE: 1
RESPIRATORY NEGATIVE: 1

## 2024-09-26 ENCOUNTER — TELEPHONE (OUTPATIENT)
Age: 69
End: 2024-09-26

## 2024-10-01 LAB
ALBUMIN SERPL-MCNC: 4.4 G/DL (ref 3.9–4.9)
ALP SERPL-CCNC: 109 IU/L (ref 44–121)
ALT SERPL-CCNC: 18 IU/L (ref 0–32)
AST SERPL-CCNC: 23 IU/L (ref 0–40)
BILIRUB SERPL-MCNC: 0.4 MG/DL (ref 0–1.2)
BUN SERPL-MCNC: 18 MG/DL (ref 8–27)
BUN/CREAT SERPL: 19 (ref 12–28)
CALCIUM SERPL-MCNC: 9.5 MG/DL (ref 8.7–10.3)
CHLORIDE SERPL-SCNC: 102 MMOL/L (ref 96–106)
CO2 SERPL-SCNC: 26 MMOL/L (ref 20–29)
CREAT SERPL-MCNC: 0.94 MG/DL (ref 0.57–1)
EGFRCR SERPLBLD CKD-EPI 2021: 66 ML/MIN/1.73
GLOBULIN SER CALC-MCNC: 2.5 G/DL (ref 1.5–4.5)
GLUCOSE SERPL-MCNC: 118 MG/DL (ref 70–99)
POTASSIUM SERPL-SCNC: 4.5 MMOL/L (ref 3.5–5.2)
PROT SERPL-MCNC: 6.9 G/DL (ref 6–8.5)
SODIUM SERPL-SCNC: 141 MMOL/L (ref 134–144)

## 2024-10-25 DIAGNOSIS — I10 PRIMARY HYPERTENSION: ICD-10-CM

## 2024-10-25 RX ORDER — TRIAMTERENE AND HYDROCHLOROTHIAZIDE 37.5; 25 MG/1; MG/1
1 CAPSULE ORAL DAILY
Qty: 90 CAPSULE | Refills: 1 | Status: SHIPPED | OUTPATIENT
Start: 2024-10-25

## 2024-11-21 ENCOUNTER — OFFICE VISIT (OUTPATIENT)
Age: 69
End: 2024-11-21
Payer: MEDICARE

## 2024-11-21 VITALS
OXYGEN SATURATION: 99 % | HEIGHT: 64 IN | HEART RATE: 74 BPM | WEIGHT: 202 LBS | SYSTOLIC BLOOD PRESSURE: 160 MMHG | TEMPERATURE: 98.2 F | DIASTOLIC BLOOD PRESSURE: 100 MMHG | RESPIRATION RATE: 16 BRPM | BODY MASS INDEX: 34.49 KG/M2

## 2024-11-21 DIAGNOSIS — R73.03 PREDIABETES: ICD-10-CM

## 2024-11-21 DIAGNOSIS — I10 PRIMARY HYPERTENSION: ICD-10-CM

## 2024-11-21 DIAGNOSIS — E55.9 VITAMIN D DEFICIENCY: ICD-10-CM

## 2024-11-21 DIAGNOSIS — F17.200 SMOKING: ICD-10-CM

## 2024-11-21 DIAGNOSIS — E78.2 MIXED HYPERLIPIDEMIA: Primary | ICD-10-CM

## 2024-11-21 PROCEDURE — 1126F AMNT PAIN NOTED NONE PRSNT: CPT | Performed by: INTERNAL MEDICINE

## 2024-11-21 PROCEDURE — 3077F SYST BP >= 140 MM HG: CPT | Performed by: INTERNAL MEDICINE

## 2024-11-21 PROCEDURE — 3079F DIAST BP 80-89 MM HG: CPT | Performed by: INTERNAL MEDICINE

## 2024-11-21 PROCEDURE — 1159F MED LIST DOCD IN RCRD: CPT | Performed by: INTERNAL MEDICINE

## 2024-11-21 PROCEDURE — 1160F RVW MEDS BY RX/DR IN RCRD: CPT | Performed by: INTERNAL MEDICINE

## 2024-11-21 PROCEDURE — 99214 OFFICE O/P EST MOD 30 MIN: CPT | Performed by: INTERNAL MEDICINE

## 2024-11-21 PROCEDURE — 1123F ACP DISCUSS/DSCN MKR DOCD: CPT | Performed by: INTERNAL MEDICINE

## 2024-11-21 RX ORDER — LOSARTAN POTASSIUM 50 MG/1
50 TABLET ORAL DAILY
Qty: 90 TABLET | Refills: 1 | Status: SHIPPED | OUTPATIENT
Start: 2024-11-21

## 2024-11-21 RX ORDER — LIFITEGRAST 50 MG/ML
SOLUTION/ DROPS OPHTHALMIC
COMMUNITY
End: 2024-11-21

## 2024-11-21 RX ORDER — BISOPROLOL FUMARATE 5 MG/1
5 TABLET, FILM COATED ORAL DAILY
Qty: 90 TABLET | Refills: 1 | Status: SHIPPED | OUTPATIENT
Start: 2024-11-21

## 2024-11-21 RX ORDER — TRIAMTERENE AND HYDROCHLOROTHIAZIDE 37.5; 25 MG/1; MG/1
1 CAPSULE ORAL DAILY
Qty: 90 CAPSULE | Refills: 1 | Status: SHIPPED | OUTPATIENT
Start: 2024-11-21

## 2024-11-21 ASSESSMENT — PATIENT HEALTH QUESTIONNAIRE - PHQ9
SUM OF ALL RESPONSES TO PHQ9 QUESTIONS 1 & 2: 4
SUM OF ALL RESPONSES TO PHQ QUESTIONS 1-9: 5
2. FEELING DOWN, DEPRESSED OR HOPELESS: MORE THAN HALF THE DAYS
5. POOR APPETITE OR OVEREATING: NOT AT ALL
6. FEELING BAD ABOUT YOURSELF - OR THAT YOU ARE A FAILURE OR HAVE LET YOURSELF OR YOUR FAMILY DOWN: NOT AT ALL
3. TROUBLE FALLING OR STAYING ASLEEP: NOT AT ALL
1. LITTLE INTEREST OR PLEASURE IN DOING THINGS: MORE THAN HALF THE DAYS
10. IF YOU CHECKED OFF ANY PROBLEMS, HOW DIFFICULT HAVE THESE PROBLEMS MADE IT FOR YOU TO DO YOUR WORK, TAKE CARE OF THINGS AT HOME, OR GET ALONG WITH OTHER PEOPLE: VERY DIFFICULT
SUM OF ALL RESPONSES TO PHQ QUESTIONS 1-9: 5
SUM OF ALL RESPONSES TO PHQ QUESTIONS 1-9: 5
4. FEELING TIRED OR HAVING LITTLE ENERGY: NOT AT ALL
8. MOVING OR SPEAKING SO SLOWLY THAT OTHER PEOPLE COULD HAVE NOTICED. OR THE OPPOSITE, BEING SO FIGETY OR RESTLESS THAT YOU HAVE BEEN MOVING AROUND A LOT MORE THAN USUAL: NOT AT ALL
SUM OF ALL RESPONSES TO PHQ QUESTIONS 1-9: 5
7. TROUBLE CONCENTRATING ON THINGS, SUCH AS READING THE NEWSPAPER OR WATCHING TELEVISION: SEVERAL DAYS
9. THOUGHTS THAT YOU WOULD BE BETTER OFF DEAD, OR OF HURTING YOURSELF: NOT AT ALL

## 2024-11-21 ASSESSMENT — ENCOUNTER SYMPTOMS
EYES NEGATIVE: 1
GASTROINTESTINAL NEGATIVE: 1
RESPIRATORY NEGATIVE: 1

## 2024-11-21 NOTE — PROGRESS NOTES
Chief Complaint   Patient presents with    Hypertension    EIN (endometrial intraepithelial neoplasia)    Cholesterol Problem    Vitamin D deficiency    Follow-up Chronic Condition     \"Have you been to the ER, urgent care clinic since your last visit?  Hospitalized since your last visit?\"    NO    “Have you seen or consulted any other health care providers outside our system since your last visit?”    NO

## 2024-11-21 NOTE — PROGRESS NOTES
Chief Complaint   Patient presents with    Hypertension    EIN (endometrial intraepithelial neoplasia)    Cholesterol Problem    Vitamin D deficiency    Follow-up Chronic Condition    Depression           History of Present Illness  The patient is a 69-year-old female who presents for evaluation of multiple medical concerns.    Blood Pressure Discrepancy  She reports a discrepancy in her blood pressure readings, with a reading of 160/80 at the clinic and 122/54 at home. She is currently on half a tablet of losartan 50 mg daily, as per previous instructions. Additionally, she takes bisoprolol 5 mg once daily.  - Onset: Not specified.  - Location: Blood pressure readings.  - Character: Discrepancy in readings (160/80 at clinic, 122/54 at home).  - Alleviating/Aggravating Factors: Not specified.  - Timing: Not specified.  - Severity: Not specified.    Weight Loss Efforts  She is actively trying to lose weight and has successfully lost 4 pounds. Her current weight is 202 pounds, down from 206 pounds a few months ago.  - Onset: A few months ago.  - Duration: Ongoing.  - Character: Weight loss of 4 pounds.  - Severity: Current weight is 202 pounds, down from 206 pounds.    Smoking Cessation Efforts  She is making efforts to quit smoking, having reduced her intake to 5 or 6 cigarettes per day. She has tried nicotine patches in the past but did not find them helpful.  - Onset: Not specified.  - Duration: Ongoing.  - Character: Reduced intake to 5 or 6 cigarettes per day.  - Alleviating/Aggravating Factors: Nicotine patches not helpful.    Influenza Vaccination  She has not received her influenza vaccine.    Past Medical History:   Diagnosis Date    Complex endometrial hyperplasia     COVID-19 vaccine series completed 3/22/21,  4/23/21    MODERNA    Herpes     High cholesterol     Hypercholesterolemia     Hypertension     Post-menopausal bleeding      Review of Systems   Constitutional: Negative.    HENT: Negative.     Eyes:

## 2024-11-22 LAB
25(OH)D3+25(OH)D2 SERPL-MCNC: 31.6 NG/ML (ref 30–100)
ALBUMIN SERPL-MCNC: 4.3 G/DL (ref 3.9–4.9)
ALP SERPL-CCNC: 126 IU/L (ref 44–121)
ALT SERPL-CCNC: 14 IU/L (ref 0–32)
AST SERPL-CCNC: 16 IU/L (ref 0–40)
BILIRUB SERPL-MCNC: 0.4 MG/DL (ref 0–1.2)
BUN SERPL-MCNC: 10 MG/DL (ref 8–27)
BUN/CREAT SERPL: 12 (ref 12–28)
CALCIUM SERPL-MCNC: 9.9 MG/DL (ref 8.7–10.3)
CHLORIDE SERPL-SCNC: 99 MMOL/L (ref 96–106)
CO2 SERPL-SCNC: 25 MMOL/L (ref 20–29)
CREAT SERPL-MCNC: 0.84 MG/DL (ref 0.57–1)
EGFRCR SERPLBLD CKD-EPI 2021: 75 ML/MIN/1.73
GLOBULIN SER CALC-MCNC: 2.7 G/DL (ref 1.5–4.5)
GLUCOSE SERPL-MCNC: 122 MG/DL (ref 70–99)
HBA1C MFR BLD: 6.2 % (ref 4.8–5.6)
POTASSIUM SERPL-SCNC: 4.4 MMOL/L (ref 3.5–5.2)
PROT SERPL-MCNC: 7 G/DL (ref 6–8.5)
SODIUM SERPL-SCNC: 138 MMOL/L (ref 134–144)

## 2025-02-02 DIAGNOSIS — E78.2 MIXED HYPERLIPIDEMIA: ICD-10-CM

## 2025-02-03 RX ORDER — SIMVASTATIN 20 MG
20 TABLET ORAL NIGHTLY
Qty: 90 TABLET | Refills: 1 | Status: SHIPPED | OUTPATIENT
Start: 2025-02-03

## 2025-02-03 NOTE — TELEPHONE ENCOUNTER
Last appt 11/21/2024      Next Apt:     Future Appointments   Date Time Provider Department Center   3/12/2025  8:30 AM Geronimo Hernandez MD [facility] BS [facility]   5/22/2025  8:15 AM [facility]

## 2025-03-12 ENCOUNTER — CLINICAL DOCUMENTATION (OUTPATIENT)
Age: 70
End: 2025-03-12

## 2025-03-12 ENCOUNTER — OFFICE VISIT (OUTPATIENT)
Age: 70
End: 2025-03-12
Payer: MEDICARE

## 2025-03-12 VITALS
SYSTOLIC BLOOD PRESSURE: 125 MMHG | HEIGHT: 64 IN | DIASTOLIC BLOOD PRESSURE: 83 MMHG | HEART RATE: 65 BPM | BODY MASS INDEX: 34.67 KG/M2

## 2025-03-12 DIAGNOSIS — C54.1 ENDOMETRIAL CANCER (HCC): Primary | ICD-10-CM

## 2025-03-12 PROCEDURE — 99212 OFFICE O/P EST SF 10 MIN: CPT | Performed by: OBSTETRICS & GYNECOLOGY

## 2025-03-12 PROCEDURE — 1159F MED LIST DOCD IN RCRD: CPT | Performed by: OBSTETRICS & GYNECOLOGY

## 2025-03-12 PROCEDURE — 3074F SYST BP LT 130 MM HG: CPT | Performed by: OBSTETRICS & GYNECOLOGY

## 2025-03-12 PROCEDURE — 1126F AMNT PAIN NOTED NONE PRSNT: CPT | Performed by: OBSTETRICS & GYNECOLOGY

## 2025-03-12 PROCEDURE — 3079F DIAST BP 80-89 MM HG: CPT | Performed by: OBSTETRICS & GYNECOLOGY

## 2025-03-12 PROCEDURE — 1123F ACP DISCUSS/DSCN MKR DOCD: CPT | Performed by: OBSTETRICS & GYNECOLOGY

## 2025-03-12 PROCEDURE — 1160F RVW MEDS BY RX/DR IN RCRD: CPT | Performed by: OBSTETRICS & GYNECOLOGY

## 2025-03-12 ASSESSMENT — PATIENT HEALTH QUESTIONNAIRE - PHQ9
SUM OF ALL RESPONSES TO PHQ QUESTIONS 1-9: 2
SUM OF ALL RESPONSES TO PHQ QUESTIONS 1-9: 2
2. FEELING DOWN, DEPRESSED OR HOPELESS: SEVERAL DAYS
SUM OF ALL RESPONSES TO PHQ QUESTIONS 1-9: 2
SUM OF ALL RESPONSES TO PHQ QUESTIONS 1-9: 2
1. LITTLE INTEREST OR PLEASURE IN DOING THINGS: SEVERAL DAYS

## 2025-03-12 NOTE — PROGRESS NOTES
[facility] Distress Thermometer    [facility] hospital Gynecologic Oncology    Date Screening Completed: 3/12/25    Screening Declined:  [] Yes    Number that best describes how much distress you've experienced in the past week, including today?  0 [] - No distress 1 []      2 []      3 []      4 []       5 []       6 []      7 [x]      8 []      9 []       10 [] - Extreme distress    PROBLEM LIST  Have you had concerns about any of the items below in the past week, including today?      Physical Concerns Practical Concerns   [] Pain [] Taking care of myself    [] Sleep [] Taking care of others    [] Fatigue [] Safety   [] Tobacco use  [] Work   [] Substance use  [] School   [] Memory or concentration [] Housing/Utilities   [] Sexual health [] Finances   [] Changes in eating  [] Insurance   [] Loss or change of physical abilities  [] Transportation    []    Emotional Concerns [] Having enough food   [] Worry or anxiety [] Access to medicine   [x] Sadness or depression [] Treatment decisions   [] Loss of interest or enjoyment     [x] Grief or loss  Spiritual or Yarsani Concerns   [] Fear [] Sense of meaning or purpose   [] Loneliness  [] Changes in mary ann or beliefs   [] Anger [] Death, dying, or afterlife   [] Changes in appearance [] Conflict between beliefs and cancer treatments    [] Feelings of worthlessness or being a burden [] Relationship with the sacred    [] Ritual or dietary needs    Social Concerns     [] Relationship with spouse or partner     [] Relationship with children    [] Relationship with family members     [] Relationship with friends or coworkers     [] Communication with health care team     [] Ability to have children     [] Prejudice or discrimination        Other Concerns: n/a

## 2025-03-12 NOTE — PROGRESS NOTES
[facility] GYNECOLOGIC ONCOLOGY  [address] [place] [address], [address]  [place] [address]  P [phone]  F [phone]    Office Note  Patient ID:  Name:  Kalina Ortiz  MRN:  [id number]  :  1955/67 y.o.  Date:  2023      HISTORY OF PRESENT ILLNESS:  Ms. Kalina Ortiz is a 69 y.o.  female with a working diagnosis of complex endometrial hyperplasia with atypia. On 2021 underwent Total laparoscopic hysterectomy with bilateral salpingo-oophorectomy, Bilateral sentinel lymph node mapping and biopsy, lysis of adhesions. Final pathology consistent with Stage Ia, FIGO Grade 1 endometrial cancer. 1mm myometrial invasion (<50%). Negative LVSI. Negative SLN.     Presents today for continued surveillance. Doing well without complaints. Denies vaginal bleeding/discharge, abdominal/pelvic pain, nausea, vomiting, constipation, diarrhea, CP, SOB, hematuria, hematochezia, change in appetite or bowel movements, bloating, fevers, chills, or urinary symptoms.       Initial History:  Ms. Kalina Ortiz is a 67 y.o. postmenopausal female who presents as a new patient from Dr. Jenkins for complex endometrial hyperplasia with atypia.    The patient reports life stressors last summer, and during all of that she reports having a light period in 2020. She presented to her PCPs office in 2020 per patient and reported that she had a normal pap smear and no further workup was done. She later presented in 2021 to her PCP and saw her doctor rather than the NP who recommended a referral to an ObGyn. She ultimately saw Dr. Jenkins and underwent an endometrial biopsy on 2021 consistent with complex endometrial hyperplasia with atypia. Reports very minimal spotting now. Denies pelvic or abdominal pain/bloating. Denies CP or SOB. Denies nausea or vomiting. Denies change in appetite or bowel habits.      Pertinent PMH/PSH: Smoker, HTN, HPL      Active, no restrictions.     Pathology Review:  2021:  * *

## 2025-03-12 NOTE — PROGRESS NOTES
6 month follow up for endometrial cancer ,  pt reports no abnormal spotting or bleeding, pt states no questions or concerns for today's visit    1. Have you been to the ER, urgent care clinic since your last visit?  Hospitalized since your last visit?  no    2. Have you seen or consulted any other health care providers outside of the [facility] since your last visit?  Include any pap smears or colon screening.   no

## 2025-03-13 ENCOUNTER — TELEPHONE (OUTPATIENT)
Age: 70
End: 2025-03-13

## 2025-03-13 NOTE — TELEPHONE ENCOUNTER
LMSW called patient to follow up on Distress Thermometer and left a voicemail offering support. LMSW encouraged a call back.

## 2025-04-22 DIAGNOSIS — I10 PRIMARY HYPERTENSION: ICD-10-CM

## 2025-04-22 RX ORDER — LOSARTAN POTASSIUM 50 MG/1
50 TABLET ORAL DAILY
Qty: 90 TABLET | Refills: 1 | Status: SHIPPED | OUTPATIENT
Start: 2025-04-22

## 2025-05-07 DIAGNOSIS — I10 PRIMARY HYPERTENSION: ICD-10-CM

## 2025-05-07 RX ORDER — BISOPROLOL FUMARATE 5 MG/1
5 TABLET, FILM COATED ORAL DAILY
Qty: 90 TABLET | Refills: 1 | Status: SHIPPED | OUTPATIENT
Start: 2025-05-07

## 2025-05-14 DIAGNOSIS — I10 PRIMARY HYPERTENSION: ICD-10-CM

## 2025-05-14 NOTE — TELEPHONE ENCOUNTER
Patient called wanting to know if she is supposed to take both losartan and bisoprolol.Please call her back at [phone].    She had also called for a refill of losartan but a 90 day script was just sent to the pharmacy on 04/22/2025

## 2025-05-15 RX ORDER — BISOPROLOL FUMARATE 5 MG/1
5 TABLET, FILM COATED ORAL DAILY
Qty: 90 TABLET | Refills: 1 | Status: SHIPPED | OUTPATIENT
Start: 2025-05-15

## 2025-05-15 RX ORDER — LOSARTAN POTASSIUM 50 MG/1
50 TABLET ORAL DAILY
Qty: 90 TABLET | Refills: 1 | Status: SHIPPED | OUTPATIENT
Start: 2025-05-15

## 2025-05-15 NOTE — TELEPHONE ENCOUNTER
Kalina Ortiz was called and verbalized understanding on note below.     Last appt 11/21/2024      Next Apt:     Future Appointments   Date Time Provider Department Center   5/22/2025  8:15 AM Carlene Melvin MD [facility]   9/17/2025  8:30 AM Geronimo Hernandez MD [facility] BS [facility], [facility]. - [facility] [address] - P [phone] - F [phone]  [address] [place] [address]  Phone: [phone] Fax: [phone]

## 2025-05-22 ENCOUNTER — OFFICE VISIT (OUTPATIENT)
Age: 70
End: 2025-05-22
Payer: MEDICARE

## 2025-05-22 VITALS
BODY MASS INDEX: 35 KG/M2 | DIASTOLIC BLOOD PRESSURE: 70 MMHG | HEIGHT: 64 IN | TEMPERATURE: 97.9 F | RESPIRATION RATE: 16 BRPM | WEIGHT: 205 LBS | OXYGEN SATURATION: 97 % | SYSTOLIC BLOOD PRESSURE: 136 MMHG | HEART RATE: 75 BPM

## 2025-05-22 DIAGNOSIS — Z00.00 MEDICARE ANNUAL WELLNESS VISIT, SUBSEQUENT: ICD-10-CM

## 2025-05-22 DIAGNOSIS — M75.52 BURSITIS OF LEFT SHOULDER: ICD-10-CM

## 2025-05-22 DIAGNOSIS — F32.A DEPRESSION, UNSPECIFIED DEPRESSION TYPE: ICD-10-CM

## 2025-05-22 DIAGNOSIS — Z12.31 ENCOUNTER FOR SCREENING MAMMOGRAM FOR MALIGNANT NEOPLASM OF BREAST: ICD-10-CM

## 2025-05-22 DIAGNOSIS — Z78.0 POST-MENOPAUSE: ICD-10-CM

## 2025-05-22 DIAGNOSIS — E78.2 MIXED HYPERLIPIDEMIA: ICD-10-CM

## 2025-05-22 DIAGNOSIS — R73.03 PREDIABETES: ICD-10-CM

## 2025-05-22 DIAGNOSIS — I10 PRIMARY HYPERTENSION: Primary | ICD-10-CM

## 2025-05-22 DIAGNOSIS — Z71.89 ACP (ADVANCE CARE PLANNING): ICD-10-CM

## 2025-05-22 LAB
BASOPHILS # BLD AUTO: 0 X10E3/UL (ref 0–0.2)
BASOPHILS NFR BLD AUTO: 0 %
EOSINOPHIL # BLD AUTO: 0.2 X10E3/UL (ref 0–0.4)
EOSINOPHIL NFR BLD AUTO: 3 %
ERYTHROCYTE [DISTWIDTH] IN BLOOD BY AUTOMATED COUNT: 12.1 % (ref 11.7–15.4)
HCT VFR BLD AUTO: 36.5 % (ref 34–46.6)
HGB BLD-MCNC: 12 G/DL (ref 11.1–15.9)
IMM GRANULOCYTES # BLD AUTO: 0 X10E3/UL (ref 0–0.1)
IMM GRANULOCYTES NFR BLD AUTO: 0 %
LYMPHOCYTES # BLD AUTO: 1.7 X10E3/UL (ref 0.7–3.1)
LYMPHOCYTES NFR BLD AUTO: 33 %
MCH RBC QN AUTO: 31.3 PG (ref 26.6–33)
MCHC RBC AUTO-ENTMCNC: 32.9 G/DL (ref 31.5–35.7)
MCV RBC AUTO: 95 FL (ref 79–97)
MONOCYTES # BLD AUTO: 0.4 X10E3/UL (ref 0.1–0.9)
MONOCYTES NFR BLD AUTO: 8 %
NEUTROPHILS # BLD AUTO: 2.9 X10E3/UL (ref 1.4–7)
NEUTROPHILS NFR BLD AUTO: 56 %
PLATELET # BLD AUTO: 327 X10E3/UL (ref 150–450)
RBC # BLD AUTO: 3.84 X10E6/UL (ref 3.77–5.28)
WBC # BLD AUTO: 5.2 X10E3/UL (ref 3.4–10.8)

## 2025-05-22 PROCEDURE — 3078F DIAST BP <80 MM HG: CPT | Performed by: INTERNAL MEDICINE

## 2025-05-22 PROCEDURE — 3075F SYST BP GE 130 - 139MM HG: CPT | Performed by: INTERNAL MEDICINE

## 2025-05-22 PROCEDURE — 99214 OFFICE O/P EST MOD 30 MIN: CPT | Performed by: INTERNAL MEDICINE

## 2025-05-22 PROCEDURE — 1126F AMNT PAIN NOTED NONE PRSNT: CPT | Performed by: INTERNAL MEDICINE

## 2025-05-22 PROCEDURE — 99497 ADVNCD CARE PLAN 30 MIN: CPT | Performed by: INTERNAL MEDICINE

## 2025-05-22 PROCEDURE — 1160F RVW MEDS BY RX/DR IN RCRD: CPT | Performed by: INTERNAL MEDICINE

## 2025-05-22 PROCEDURE — 1159F MED LIST DOCD IN RCRD: CPT | Performed by: INTERNAL MEDICINE

## 2025-05-22 PROCEDURE — G0439 PPPS, SUBSEQ VISIT: HCPCS | Performed by: INTERNAL MEDICINE

## 2025-05-22 PROCEDURE — 1123F ACP DISCUSS/DSCN MKR DOCD: CPT | Performed by: INTERNAL MEDICINE

## 2025-05-22 RX ORDER — TRIAMTERENE AND HYDROCHLOROTHIAZIDE 37.5; 25 MG/1; MG/1
1 CAPSULE ORAL DAILY
Qty: 90 CAPSULE | Refills: 1 | Status: SHIPPED | OUTPATIENT
Start: 2025-05-22

## 2025-05-22 RX ORDER — SIMVASTATIN 20 MG
20 TABLET ORAL NIGHTLY
Qty: 90 TABLET | Refills: 1 | Status: SHIPPED | OUTPATIENT
Start: 2025-05-22

## 2025-05-22 RX ORDER — BISOPROLOL FUMARATE 5 MG/1
5 TABLET, FILM COATED ORAL DAILY
Qty: 90 TABLET | Refills: 1 | Status: SHIPPED | OUTPATIENT
Start: 2025-05-22

## 2025-05-22 RX ORDER — DICLOFENAC SODIUM 75 MG/1
75 TABLET, DELAYED RELEASE ORAL 2 TIMES DAILY PRN
Qty: 20 TABLET | Refills: 0 | Status: SHIPPED | OUTPATIENT
Start: 2025-05-22

## 2025-05-22 RX ORDER — LOSARTAN POTASSIUM 50 MG/1
50 TABLET ORAL DAILY
COMMUNITY
End: 2025-05-22 | Stop reason: SDUPTHER

## 2025-05-22 RX ORDER — LOSARTAN POTASSIUM 50 MG/1
50 TABLET ORAL DAILY
Qty: 90 TABLET | Refills: 1 | Status: SHIPPED | OUTPATIENT
Start: 2025-05-22

## 2025-05-22 SDOH — ECONOMIC STABILITY: TRANSPORTATION INSECURITY: IN THE PAST 12 MONTHS, HAS LACK OF TRANSPORTATION KEPT YOU FROM MEDICAL APPOINTMENTS OR FROM GETTING MEDICATIONS?: NO

## 2025-05-22 SDOH — HEALTH STABILITY: MENTAL HEALTH: HOW OFTEN DO YOU HAVE A DRINK CONTAINING ALCOHOL?: NEVER

## 2025-05-22 SDOH — HEALTH STABILITY: PHYSICAL HEALTH: ON AVERAGE, HOW MANY DAYS PER WEEK DO YOU ENGAGE IN MODERATE TO STRENUOUS EXERCISE (LIKE A BRISK WALK)?: 0 DAYS

## 2025-05-22 SDOH — ECONOMIC STABILITY: HOUSING INSECURITY: IN THE LAST 12 MONTHS, WAS THERE A TIME WHEN YOU WERE NOT ABLE TO PAY THE MORTGAGE OR RENT ON TIME?: NO

## 2025-05-22 SDOH — ECONOMIC STABILITY: FOOD INSECURITY: HOW HARD IS IT FOR YOU TO PAY FOR THE VERY BASICS LIKE FOOD, HOUSING, MEDICAL CARE, AND HEATING?: NOT HARD AT ALL

## 2025-05-22 SDOH — ECONOMIC STABILITY: FOOD INSECURITY: WITHIN THE PAST 12 MONTHS, YOU WORRIED THAT YOUR FOOD WOULD RUN OUT BEFORE YOU GOT THE MONEY TO BUY MORE.: NEVER TRUE

## 2025-05-22 SDOH — HEALTH STABILITY: PHYSICAL HEALTH: ON AVERAGE, HOW MANY MINUTES DO YOU ENGAGE IN EXERCISE AT THIS LEVEL?: 0 MIN

## 2025-05-22 SDOH — ECONOMIC STABILITY: FOOD INSECURITY: WITHIN THE PAST 12 MONTHS, THE FOOD YOU BOUGHT JUST DIDN'T LAST AND YOU DIDN'T HAVE MONEY TO GET MORE.: NEVER TRUE

## 2025-05-22 SDOH — HEALTH STABILITY: MENTAL HEALTH: HOW MANY DRINKS CONTAINING ALCOHOL DO YOU HAVE ON A TYPICAL DAY WHEN YOU ARE DRINKING?: PATIENT DOES NOT DRINK

## 2025-05-22 ASSESSMENT — PATIENT HEALTH QUESTIONNAIRE - PHQ9
4. FEELING TIRED OR HAVING LITTLE ENERGY: NOT AT ALL
SUM OF ALL RESPONSES TO PHQ QUESTIONS 1-9: 2
SUM OF ALL RESPONSES TO PHQ QUESTIONS 1-9: 2
2. FEELING DOWN, DEPRESSED OR HOPELESS: SEVERAL DAYS
10. IF YOU CHECKED OFF ANY PROBLEMS, HOW DIFFICULT HAVE THESE PROBLEMS MADE IT FOR YOU TO DO YOUR WORK, TAKE CARE OF THINGS AT HOME, OR GET ALONG WITH OTHER PEOPLE: SOMEWHAT DIFFICULT
SUM OF ALL RESPONSES TO PHQ QUESTIONS 1-9: 2
7. TROUBLE CONCENTRATING ON THINGS, SUCH AS READING THE NEWSPAPER OR WATCHING TELEVISION: NOT AT ALL
6. FEELING BAD ABOUT YOURSELF - OR THAT YOU ARE A FAILURE OR HAVE LET YOURSELF OR YOUR FAMILY DOWN: NOT AT ALL
9. THOUGHTS THAT YOU WOULD BE BETTER OFF DEAD, OR OF HURTING YOURSELF: NOT AT ALL
3. TROUBLE FALLING OR STAYING ASLEEP: NOT AT ALL
5. POOR APPETITE OR OVEREATING: NOT AT ALL
1. LITTLE INTEREST OR PLEASURE IN DOING THINGS: SEVERAL DAYS
8. MOVING OR SPEAKING SO SLOWLY THAT OTHER PEOPLE COULD HAVE NOTICED. OR THE OPPOSITE, BEING SO FIGETY OR RESTLESS THAT YOU HAVE BEEN MOVING AROUND A LOT MORE THAN USUAL: NOT AT ALL
SUM OF ALL RESPONSES TO PHQ QUESTIONS 1-9: 2

## 2025-05-22 ASSESSMENT — ACTIVITIES OF DAILY LIVING (ADL): LACK_OF_TRANSPORTATION: NO

## 2025-05-22 ASSESSMENT — ENCOUNTER SYMPTOMS
EYES NEGATIVE: 1
RESPIRATORY NEGATIVE: 1
GASTROINTESTINAL NEGATIVE: 1

## 2025-05-22 NOTE — PROGRESS NOTES
Chief Complaint   Patient presents with    Medicare AWV    Endometrial cancer (HCC)    EIN (endometrial intraepithelial neoplasia)    Cholesterol Problem           History of Present Illness  Ms. Bui is here for follow-up.  She is having left shoulder pain on and off for past 2 weeks.  She has been lifting a lot of weight.  No weakness in the left shoulder.  Has hypertension, compliant on medication.  Denies chest pain palpitation or shortness of breath.  Need refill on medication.  Has prediabetes, watching carbohydrate.  Need lab work.  Has hyperlipidemia, on statin.  No myalgia.  Eye checkup up-to-date.  She is trying to watch diet and try to lose weight.  Mammogram and bone density.  Colonoscopy will do next year.  She refused any vaccines.    Past Medical History:   Diagnosis Date    Complex endometrial hyperplasia     COVID-19 vaccine series completed 3/22/21,  4/23/21    MODERNA    Herpes     High cholesterol     Hypercholesterolemia     Hypertension     Post-menopausal bleeding      Review of Systems   Constitutional: Negative.    HENT: Negative.     Eyes: Negative.    Respiratory: Negative.     Cardiovascular: Negative.    Gastrointestinal: Negative.    Endocrine: Negative.    Genitourinary: Negative.    Musculoskeletal:  Positive for arthralgias.   Skin: Negative.    Neurological: Negative.    Hematological: Negative.    Psychiatric/Behavioral: Negative.         Vitals:    05/22/25 0804   BP: 136/70   Pulse: 75   Resp: 16   Temp: 97.9 °F (36.6 °C)   SpO2: 97%     Physical Exam      Physical Exam  Vitals and nursing note reviewed.   Constitutional:       General: She is not in acute distress.     Appearance: Normal appearance.well-developed,not diaphoretic.   HENT:       Neck: Supple, no JVP or carotid bruit. No cervical adenopathy.       Cardiovascular:      Rate and Rhythm: Normal rate and regular rhythm.   Pulmonary:      Effort: Pulmonary effort is normal.      Breath sounds: Normal breath sounds. No

## 2025-05-22 NOTE — ACP (ADVANCE CARE PLANNING)

## 2025-05-22 NOTE — PROGRESS NOTES
Chief Complaint   Patient presents with    Medicare AWV    Endometrial cancer (HCC)    EIN (endometrial intraepithelial neoplasia)    Cholesterol Problem           History of Present Illness      Past Medical History:   Diagnosis Date    Complex endometrial hyperplasia     COVID-19 vaccine series completed 3/22/21,  4/23/21    MODERNA    Herpes     High cholesterol     Hypercholesterolemia     Hypertension     Post-menopausal bleeding      Review of Systems    Vitals:    05/22/25 0804   BP: 136/70   Pulse: 75   Resp: 16   Temp: 97.9 °F (36.6 °C)   SpO2: 97%     Physical Exam            Kalina was seen today for medicare awv, endometrial cancer (hcc), ein (endometrial intraepithelial neoplasia) and cholesterol problem.    Diagnoses and all orders for this visit:    Primary hypertension  -     triamterene-hydroCHLOROthiazide (DYAZIDE) 37.5-25 MG per capsule; Take 1 capsule by mouth daily  -     bisoprolol (ZEBETA) 5 MG tablet; Take 1 tablet by mouth daily  -     Comprehensive Metabolic Panel  -     CBC with Auto Differential    Mixed hyperlipidemia  -     simvastatin (ZOCOR) 20 MG tablet; Take 1 tablet by mouth nightly  -     Comprehensive Metabolic Panel  -     Lipid Panel    Prediabetes  -     Comprehensive Metabolic Panel  -     Hemoglobin A1C    Depression, unspecified depression type    Medicare annual wellness visit, subsequent    ACP (advance care planning)    Encounter for screening mammogram for malignant neoplasm of breast  -     ZEE DIGITAL SCREEN W OR WO CAD BILATERAL; Future    Post-menopause  -     DEXA BONE DENSITY AXIAL SKELETON; Future    Other orders  -     losartan (COZAAR) 50 MG tablet; Take 1 tablet by mouth daily        Assessment & Plan        The patient (or guardian, if applicable) and other individuals in attendance with the patient were advised that Artificial Intelligence will be utilized during this visit to record and process the conversation to generate a clinical note. The patient (or

## 2025-05-22 NOTE — PROGRESS NOTES
Chief Complaint   Patient presents with    Medicare AWV    Endometrial cancer (HCC)    EIN (endometrial intraepithelial neoplasia)    Cholesterol Problem     Have you been to the ER, urgent care clinic since your last visit?  Hospitalized since your last visit?   NO    Have you seen or consulted any other health care providers outside our system since your last visit?   NO

## 2025-05-23 LAB
ALBUMIN SERPL-MCNC: 4.4 G/DL (ref 3.9–4.9)
ALP SERPL-CCNC: 126 IU/L (ref 44–121)
ALT SERPL-CCNC: 10 IU/L (ref 0–32)
AST SERPL-CCNC: 17 IU/L (ref 0–40)
BILIRUB SERPL-MCNC: 0.5 MG/DL (ref 0–1.2)
BUN SERPL-MCNC: 12 MG/DL (ref 8–27)
BUN/CREAT SERPL: 13 (ref 12–28)
CALCIUM SERPL-MCNC: 9.5 MG/DL (ref 8.7–10.3)
CHLORIDE SERPL-SCNC: 100 MMOL/L (ref 96–106)
CHOLEST SERPL-MCNC: 191 MG/DL (ref 100–199)
CO2 SERPL-SCNC: 25 MMOL/L (ref 20–29)
CREAT SERPL-MCNC: 0.95 MG/DL (ref 0.57–1)
EGFRCR SERPLBLD CKD-EPI 2021: 65 ML/MIN/1.73
GLOBULIN SER CALC-MCNC: 2.7 G/DL (ref 1.5–4.5)
GLUCOSE SERPL-MCNC: 118 MG/DL (ref 70–99)
HBA1C MFR BLD: 6 % (ref 4.8–5.6)
HDLC SERPL-MCNC: 53 MG/DL
IMP & REVIEW OF LAB RESULTS: NORMAL
LDLC SERPL CALC-MCNC: 118 MG/DL (ref 0–99)
POTASSIUM SERPL-SCNC: 4.1 MMOL/L (ref 3.5–5.2)
PROT SERPL-MCNC: 7.1 G/DL (ref 6–8.5)
SODIUM SERPL-SCNC: 137 MMOL/L (ref 134–144)
TRIGL SERPL-MCNC: 113 MG/DL (ref 0–149)
VLDLC SERPL CALC-MCNC: 20 MG/DL (ref 5–40)

## 2025-05-26 ENCOUNTER — RESULTS FOLLOW-UP (OUTPATIENT)
Age: 70
End: 2025-05-26

## 2025-05-27 ENCOUNTER — TELEPHONE (OUTPATIENT)
Age: 70
End: 2025-05-27

## 2025-05-27 NOTE — TELEPHONE ENCOUNTER
Possible allergic reaction.   diclofenac (VOLTAREN) 75 MG EC tablet     Discomfort in vaginal area. (Burning sensation)     Pt stated she's scheduled to go to the beach on Friday.

## 2025-09-02 ENCOUNTER — HOSPITAL ENCOUNTER (OUTPATIENT)
Age: 70
Discharge: HOME | End: 2025-09-05
Payer: MEDICARE

## 2025-09-02 ENCOUNTER — RESULTS FOLLOW-UP (OUTPATIENT)
Age: 70
End: 2025-09-02

## 2025-09-02 VITALS — HEIGHT: 64 IN | WEIGHT: 200 LBS | BODY MASS INDEX: 34.15 KG/M2

## 2025-09-02 DIAGNOSIS — Z12.31 ENCOUNTER FOR SCREENING MAMMOGRAM FOR MALIGNANT NEOPLASM OF BREAST: ICD-10-CM

## 2025-09-02 DIAGNOSIS — Z78.0 POST-MENOPAUSE: ICD-10-CM

## 2025-09-02 PROCEDURE — 77063 BREAST TOMOSYNTHESIS BI: CPT

## 2025-09-02 PROCEDURE — 77080 DXA BONE DENSITY AXIAL: CPT

## (undated) DEVICE — REM POLYHESIVE ADULT PATIENT RETURN ELECTRODE: Brand: VALLEYLAB

## (undated) DEVICE — VISUALIZATION SYSTEM: Brand: CLEARIFY

## (undated) DEVICE — SYR 10ML LUER LOK 1/5ML GRAD --

## (undated) DEVICE — FILTER SMK EVAC FLO CLR MEGADYNE

## (undated) DEVICE — VCARE MEDIUM, UTERINE MANIPULATOR, VAGINAL-CERVICAL-AHLUWALIA'S-RETRACTOR-ELEVATOR: Brand: VCARE

## (undated) DEVICE — TOTAL TRAY, DB, 100% SILI FOLEY, 16FR 10: Brand: MEDLINE

## (undated) DEVICE — AGENT HEMSTAT W4XL4IN OXIDIZED REGENERATED CELOS ABSRB SFT

## (undated) DEVICE — NEEDLE HYPO 22GA L1.5IN BLK S STL HUB POLYPR SHLD REG BVL

## (undated) DEVICE — Device

## (undated) DEVICE — SYR 50ML LR LCK 1ML GRAD NSAF --

## (undated) DEVICE — DEVICE RELD SZ 0 L8IN GRN ABSRB FOR ENDO SILS STIT DEVS

## (undated) DEVICE — SEALER ENDOSCP L37CM NANO COAT BLNT TIP LAP DIV

## (undated) DEVICE — SURGICAL PROCEDURE PACK GYN LAPAROSCOPY CUST SMH LF

## (undated) DEVICE — TRAY PREP DRY W/ PREM GLV 2 APPL 6 SPNG 2 UNDPD 1 OVERWRAP

## (undated) DEVICE — SOLUTION IRRIG 1000ML 0.9% SOD CHL USP POUR PLAS BTL

## (undated) DEVICE — DRAPE,REIN 53X77,STERILE: Brand: MEDLINE

## (undated) DEVICE — SUTURING DEVICE: Brand: ENDO STITCH

## (undated) DEVICE — COVER LT HNDL PLAS RIG 1 PER PK

## (undated) DEVICE — TROCARS: Brand: KII® BALLOON BLUNT TIP SYSTEM

## (undated) DEVICE — STERILE POLYISOPRENE POWDER-FREE SURGICAL GLOVES WITH EMOLLIENT COATING: Brand: PROTEXIS

## (undated) DEVICE — STERILE NEOPRENE POWDER-FREE SURGICAL GLOVES WITH NITRILE COATING: Brand: PROTEXIS

## (undated) DEVICE — PREP SKN CHLRAPRP APL 26ML STR --

## (undated) DEVICE — TROCAR ENDOSCP L100MM DIA5MM BLDELSS STBL SL OBT RADLUC

## (undated) DEVICE — INTENDED FOR TISSUE SEPARATION, AND OTHER PROCEDURES THAT REQUIRE A SHARP SURGICAL BLADE TO PUNCTURE OR CUT.: Brand: BARD-PARKER ® CARBON RIB-BACK BLADES

## (undated) DEVICE — GARMENT,MEDLINE,DVT,INT,CALF,MED, GEN2: Brand: MEDLINE

## (undated) DEVICE — SUTURE SZ 0 27IN 5/8 CIR UR-6  TAPER PT VIOLET ABSRB VICRYL J603H

## (undated) DEVICE — SUTURE MCRYL SZ 4-0 L27IN ABSRB UD L19MM PS-2 1/2 CIR PRIM Y426H

## (undated) DEVICE — TROCAR ENDOSCP L100MM DIA5MM BLDELSS STBL SL THRD OPT VW

## (undated) DEVICE — PAD PT POS 36 IN SURGYPAD DISP

## (undated) DEVICE — SYSTEM EVAC SMOKE LAPARSCOPIC

## (undated) DEVICE — LAPAROSCOPIC TROCAR SLEEVE/SINGLE USE: Brand: KII® OPTICAL ACCESS SYSTEM

## (undated) DEVICE — PAD,SANITARY,11 IN,MAXI,N-STRL,IND WRAP: Brand: MEDLINE

## (undated) DEVICE — DERMABOND SKIN ADH 0.7ML -- DERMABOND ADVANCED 12/BX